# Patient Record
Sex: MALE | Race: WHITE | NOT HISPANIC OR LATINO
[De-identification: names, ages, dates, MRNs, and addresses within clinical notes are randomized per-mention and may not be internally consistent; named-entity substitution may affect disease eponyms.]

---

## 2019-04-03 ENCOUNTER — RECORD ABSTRACTING (OUTPATIENT)
Age: 74
End: 2019-04-03

## 2019-04-03 DIAGNOSIS — Z78.9 OTHER SPECIFIED HEALTH STATUS: ICD-10-CM

## 2019-04-03 PROBLEM — Z00.00 ENCOUNTER FOR PREVENTIVE HEALTH EXAMINATION: Status: ACTIVE | Noted: 2019-04-03

## 2019-04-03 RX ORDER — ELECTROLYTES/DEXTROSE
SOLUTION, ORAL ORAL
Refills: 0 | Status: ACTIVE | COMMUNITY

## 2019-04-03 RX ORDER — ASPIRIN ENTERIC COATED TABLETS 81 MG 81 MG/1
81 TABLET, DELAYED RELEASE ORAL DAILY
Refills: 0 | Status: ACTIVE | COMMUNITY

## 2019-04-03 RX ORDER — VIT A/VIT C/VIT E/ZINC/COPPER 4296-226
CAPSULE ORAL
Refills: 0 | Status: ACTIVE | COMMUNITY

## 2019-04-15 ENCOUNTER — RX RENEWAL (OUTPATIENT)
Age: 74
End: 2019-04-15

## 2019-10-25 DIAGNOSIS — I10 ESSENTIAL (PRIMARY) HYPERTENSION: ICD-10-CM

## 2019-10-25 DIAGNOSIS — R07.89 OTHER CHEST PAIN: ICD-10-CM

## 2019-10-25 DIAGNOSIS — Z87.891 PERSONAL HISTORY OF NICOTINE DEPENDENCE: ICD-10-CM

## 2019-10-28 ENCOUNTER — NON-APPOINTMENT (OUTPATIENT)
Age: 74
End: 2019-10-28

## 2019-10-28 ENCOUNTER — APPOINTMENT (OUTPATIENT)
Dept: CARDIOLOGY | Facility: CLINIC | Age: 74
End: 2019-10-28
Payer: MEDICARE

## 2019-10-28 PROCEDURE — 99214 OFFICE O/P EST MOD 30 MIN: CPT

## 2019-10-28 PROCEDURE — 93000 ELECTROCARDIOGRAM COMPLETE: CPT

## 2019-10-29 VITALS — DIASTOLIC BLOOD PRESSURE: 78 MMHG | HEART RATE: 84 BPM | SYSTOLIC BLOOD PRESSURE: 120 MMHG

## 2019-10-29 NOTE — PHYSICAL EXAM
[General Appearance - Well Developed] : well developed [Normal Appearance] : normal appearance [Well Groomed] : well groomed [General Appearance - Well Nourished] : well nourished [No Deformities] : no deformities [General Appearance - In No Acute Distress] : no acute distress [Normal Conjunctiva] : the conjunctiva exhibited no abnormalities [Eyelids - No Xanthelasma] : the eyelids demonstrated no xanthelasmas [Normal Oral Mucosa] : normal oral mucosa [No Oral Pallor] : no oral pallor [No Oral Cyanosis] : no oral cyanosis [Normal Jugular Venous A Waves Present] : normal jugular venous A waves present [Normal Jugular Venous V Waves Present] : normal jugular venous V waves present [No Jugular Venous Corrales A Waves] : no jugular venous corrales A waves [Respiration, Rhythm And Depth] : normal respiratory rhythm and effort [Exaggerated Use Of Accessory Muscles For Inspiration] : no accessory muscle use [Auscultation Breath Sounds / Voice Sounds] : lungs were clear to auscultation bilaterally [Heart Sounds] : normal S1 and S2 [Abdomen Soft] : soft [Abdomen Tenderness] : non-tender [Abdomen Mass (___ Cm)] : no abdominal mass palpated [Abnormal Walk] : normal gait [Gait - Sufficient For Exercise Testing] : the gait was sufficient for exercise testing [Nail Clubbing] : no clubbing of the fingernails [Cyanosis, Localized] : no localized cyanosis [Petechial Hemorrhages (___cm)] : no petechial hemorrhages [Skin Color & Pigmentation] : normal skin color and pigmentation [] : no rash [No Venous Stasis] : no venous stasis [Skin Lesions] : no skin lesions [No Skin Ulcers] : no skin ulcer [No Xanthoma] : no  xanthoma was observed [Oriented To Time, Place, And Person] : oriented to person, place, and time [Affect] : the affect was normal [Mood] : the mood was normal [No Anxiety] : not feeling anxious [FreeTextEntry1] : Faint 1/6 systolic murmur left sternal border

## 2019-10-29 NOTE — HISTORY OF PRESENT ILLNESS
[FreeTextEntry1] : This 73 year-old male patient presents for cardiovascular evaluation.\par \par His problem list is as noted above.\par \par Since his last examination 1 year ago he reports no major events or hospitalizations.  He is active but admits to being not as active as he should be.  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

## 2019-10-29 NOTE — DISCUSSION/SUMMARY
[FreeTextEntry1] : Brief recommendations and follow-up: (see above for details)\par \par Overall cardiovascular status stable.\par Continue current routine.\par I encouraged him regarding exercise and diet.\par Heart murmur noted on today's examination.  Previous 2018 echocardiogram satisfactory.  I will consider follow-up after next year.\par Next routine office visit 1 year

## 2019-10-29 NOTE — REASON FOR VISIT
[FreeTextEntry1] : Mr. YUE MANE has the following problem list:\par \par Hypertension\par Dyslipidemia\par Type 2 diabetes\par Previous evaluation for chest discomfort with normal stress testing.\par \par He has additional medical problems as noted\par \par His primary care physician is Dr. Tate.\par He is also followed by Dr. Balderas for  evaluation.

## 2020-07-05 ENCOUNTER — RX RENEWAL (OUTPATIENT)
Age: 75
End: 2020-07-05

## 2020-10-30 ENCOUNTER — NON-APPOINTMENT (OUTPATIENT)
Age: 75
End: 2020-10-30

## 2020-11-02 ENCOUNTER — NON-APPOINTMENT (OUTPATIENT)
Age: 75
End: 2020-11-02

## 2020-11-02 ENCOUNTER — APPOINTMENT (OUTPATIENT)
Dept: CARDIOLOGY | Facility: CLINIC | Age: 75
End: 2020-11-02
Payer: MEDICARE

## 2020-11-02 VITALS
BODY MASS INDEX: 25.77 KG/M2 | HEIGHT: 70 IN | HEART RATE: 76 BPM | DIASTOLIC BLOOD PRESSURE: 90 MMHG | WEIGHT: 180 LBS | SYSTOLIC BLOOD PRESSURE: 160 MMHG

## 2020-11-02 VITALS
WEIGHT: 180 LBS | DIASTOLIC BLOOD PRESSURE: 90 MMHG | SYSTOLIC BLOOD PRESSURE: 160 MMHG | HEIGHT: 70 IN | HEART RATE: 76 BPM | BODY MASS INDEX: 25.77 KG/M2

## 2020-11-02 PROCEDURE — 93000 ELECTROCARDIOGRAM COMPLETE: CPT

## 2020-11-02 PROCEDURE — 99214 OFFICE O/P EST MOD 30 MIN: CPT

## 2020-11-02 RX ORDER — MULTIVIT-MIN/FOLIC/VIT K/LYCOP 400-300MCG
1000 TABLET ORAL
Refills: 0 | Status: DISCONTINUED | COMMUNITY
End: 2020-11-02

## 2020-11-02 RX ORDER — INSULIN DETEMIR 100 [IU]/ML
100 INJECTION, SOLUTION SUBCUTANEOUS
Refills: 0 | Status: DISCONTINUED | COMMUNITY
End: 2020-11-02

## 2020-11-02 RX ORDER — METFORMIN HYDROCHLORIDE 500 MG/1
500 TABLET, COATED ORAL
Refills: 0 | Status: DISCONTINUED | COMMUNITY
End: 2020-11-02

## 2020-11-02 RX ORDER — PSYLLIUM HUSK 0.4 G
CAPSULE ORAL
Refills: 0 | Status: DISCONTINUED | COMMUNITY
End: 2020-11-02

## 2020-11-02 NOTE — HISTORY OF PRESENT ILLNESS
[FreeTextEntry1] : This 74 year-old male patient presents for cardiovascular evaluation.\par \par His problem list is as noted above.\par \par Since last examination he reports no major events or hospitalizations.  He admits that he has been more sedentary than he should be.  He does have a treadmill at home and I encouraged him to use it.  I do believe he is enthusiastic and will start doing this.\par \par There have been no acute symptoms of shortness of breath, chest discomfort, palpitation, lightheadedness, fainting.\par \par He continues to perform as an exterior .  I advised him regarding the importance of being physically fit.  He did have laboratories at the fire "[employer] and will provide them for my review.\par \par He has been taking his blood pressure at home and it has been satisfactory.\par \par He has had some medication adjustments.  His previous insulin has been changed to oral agents.\par

## 2020-11-02 NOTE — REASON FOR VISIT
[FreeTextEntry1] : Mr. YUE MANE has the following problem list:\par \par Hypertension\par Dyslipidemia\par Type 2 diabetes\par Previous evaluation for chest discomfort with normal stress testing.\par \par He has additional medical problems as noted\par \par His primary care physician is Dr. Bundy\par He is also followed by Dr. Balderas for  evaluation.

## 2020-11-02 NOTE — ASSESSMENT
[FreeTextEntry1] : EKG 11/2/2020.  Sinus rhythm, within normal limits.\par EKG 10/28/2019.  Sinus rhythm.  APC.  Otherwise within normal limits.

## 2021-11-02 ENCOUNTER — RESULT CHARGE (OUTPATIENT)
Age: 76
End: 2021-11-02

## 2021-11-02 ENCOUNTER — NON-APPOINTMENT (OUTPATIENT)
Age: 76
End: 2021-11-02

## 2021-11-03 ENCOUNTER — APPOINTMENT (OUTPATIENT)
Dept: CARDIOLOGY | Facility: CLINIC | Age: 76
End: 2021-11-03
Payer: MEDICARE

## 2021-11-03 ENCOUNTER — NON-APPOINTMENT (OUTPATIENT)
Age: 76
End: 2021-11-03

## 2021-11-03 VITALS
WEIGHT: 182 LBS | BODY MASS INDEX: 26.05 KG/M2 | TEMPERATURE: 98.7 F | HEART RATE: 66 BPM | HEIGHT: 70 IN | SYSTOLIC BLOOD PRESSURE: 130 MMHG | DIASTOLIC BLOOD PRESSURE: 80 MMHG

## 2021-11-03 VITALS
TEMPERATURE: 98.7 F | WEIGHT: 182 LBS | DIASTOLIC BLOOD PRESSURE: 80 MMHG | SYSTOLIC BLOOD PRESSURE: 130 MMHG | BODY MASS INDEX: 26.05 KG/M2 | HEART RATE: 66 BPM | HEIGHT: 70 IN

## 2021-11-03 PROCEDURE — 99214 OFFICE O/P EST MOD 30 MIN: CPT

## 2021-11-03 PROCEDURE — 93000 ELECTROCARDIOGRAM COMPLETE: CPT

## 2021-11-03 RX ORDER — DULAGLUTIDE 0.75 MG/.5ML
0.75 INJECTION, SOLUTION SUBCUTANEOUS
Refills: 0 | Status: ACTIVE | COMMUNITY

## 2021-11-03 RX ORDER — METFORMIN ER 500 MG 500 MG/1
500 TABLET ORAL DAILY
Refills: 0 | Status: ACTIVE | COMMUNITY

## 2021-11-03 NOTE — ASSESSMENT
[FreeTextEntry1] : EKG 11/3/2021.  Sinus rhythm.  Within normal limits.\par EKG 11/2/2020.  Sinus rhythm, within normal limits.\par EKG 10/28/2019.  Sinus rhythm.  APC.  Otherwise within normal limits.

## 2021-11-03 NOTE — REVIEW OF SYSTEMS
[FreeTextEntry3] : Status post bilateral cataract surgery. [FreeTextEntry4] : He has hearing loss and sometimes wears hearing aids. [FreeTextEntry5] : See HPI. [FreeTextEntry8] : Nocturia x1.  Mild ED. [FreeTextEntry9] : Mild upper extremity arthritis.

## 2021-11-03 NOTE — DISCUSSION/SUMMARY
[FreeTextEntry1] : Brief recommendations and follow-up: (see above for details)\par \par The patient's overall cardiovascular status is stable.\par I encouraged him regarding therapeutic lifestyle treatment, exercise and diet particularly important since he is a .\par It is my judgment that he should stay on his low-dose aspirin.  He is having no side effects.\par He has received the COVID-19 vaccine.  He is also gotten his flu shot.\par Next routine cardiology visit 1 year.

## 2021-11-03 NOTE — HISTORY OF PRESENT ILLNESS
[FreeTextEntry1] : This 75 year-old male patient presents for cardiovascular evaluation.\par \par His problem list is as noted above.\par \par Since his last examination 1 year ago he has had some medical events.  He did have bilateral cataract surgery.  Otherwise there have been no other major events hospitalizations or serious illnesses.  He is active and walking regularly.  He continues to serve as a .  I did advise him to maintain his fitness.\par \par He has seen his primary care physician and had laboratories.  We have called for the reports.  He also kindly provided.\par \par There have been no symptoms of shortness of breath, chest discomfort, palpitation, lightheadedness, fainting.

## 2021-11-03 NOTE — CARDIOLOGY SUMMARY
[de-identified] : Stress test 10/25/18. No ischemia. Nonspecific hypertensive type upsloping ST   depression, rapidly reversing. No symptoms. 7 minutes. Olman stage III. 8.1 METs.  Greater than 100%. Peak blood pressure 212/84. Double product 33,284.\par  [de-identified] : Echo 9/4/18. Normal LV function. EF 75%. Trivial TR. Mobile intra-atrial septum\par

## 2022-06-15 ENCOUNTER — RX RENEWAL (OUTPATIENT)
Age: 77
End: 2022-06-15

## 2022-11-07 ENCOUNTER — NON-APPOINTMENT (OUTPATIENT)
Age: 77
End: 2022-11-07

## 2022-11-08 ENCOUNTER — NON-APPOINTMENT (OUTPATIENT)
Age: 77
End: 2022-11-08

## 2022-11-08 ENCOUNTER — APPOINTMENT (OUTPATIENT)
Dept: CARDIOLOGY | Facility: CLINIC | Age: 77
End: 2022-11-08

## 2022-11-08 VITALS
WEIGHT: 188 LBS | BODY MASS INDEX: 26.92 KG/M2 | SYSTOLIC BLOOD PRESSURE: 140 MMHG | DIASTOLIC BLOOD PRESSURE: 86 MMHG | RESPIRATION RATE: 16 BRPM | TEMPERATURE: 98.7 F | HEIGHT: 70 IN | HEART RATE: 94 BPM

## 2022-11-08 VITALS — OXYGEN SATURATION: 97 %

## 2022-11-08 PROCEDURE — 99214 OFFICE O/P EST MOD 30 MIN: CPT

## 2022-11-08 PROCEDURE — 93000 ELECTROCARDIOGRAM COMPLETE: CPT

## 2022-11-08 RX ORDER — ICOSAPENT ETHYL 1000 MG/1
1 CAPSULE ORAL
Qty: 360 | Refills: 0 | Status: ACTIVE | COMMUNITY
Start: 2021-12-17

## 2022-11-08 RX ORDER — ATORVASTATIN CALCIUM 40 MG/1
40 TABLET, FILM COATED ORAL DAILY
Qty: 90 | Refills: 3 | Status: ACTIVE | COMMUNITY

## 2022-11-08 NOTE — DISCUSSION/SUMMARY
[FreeTextEntry1] : Brief recommendations and follow-up: (see above for details)\par \par Patient's overall cardiovascular status is well compensated and stable.\par I encouraged him regarding blood pressure, lipid, weight control and exercise.\par He continues to work as a .\par He will provide copies of his most recent laboratories.  Also was  physical blood work.\par Next routine cardiology visit 1 year.

## 2022-11-08 NOTE — CARDIOLOGY SUMMARY
[de-identified] : Stress test 10/25/18. No ischemia. Nonspecific hypertensive type upsloping ST   depression, rapidly reversing. No symptoms. 7 minutes. Olman stage III. 8.1 METs.  Greater than 100%. Peak blood pressure 212/84. Double product 33,284.\par  [de-identified] : Echo 9/4/18. Normal LV function. EF 75%. Trivial TR. Mobile intra-atrial septum\par

## 2022-11-08 NOTE — REASON FOR VISIT
[FreeTextEntry1] : Mr. YUE MANE has the following problem list:\par \par Hypertension\par Dyslipidemia\par Type 2 diabetes\par Previous evaluation for chest discomfort with normal stress testing.\par \par He has additional medical problems as noted\par \par His primary care physician is Dr. Bundy\par He is also followed by Dr. Dominguez for  evaluation.

## 2022-11-08 NOTE — ASSESSMENT
[FreeTextEntry1] : EKG 11/8/2022.  Sinus rhythm, within normal limits.\par EKG 11/3/2021.  Sinus rhythm.  Within normal limits.\par EKG 11/2/2020.  Sinus rhythm, within normal limits.\par EKG 10/28/2019.  Sinus rhythm.  APC.  Otherwise within normal limits.

## 2022-11-08 NOTE — HISTORY OF PRESENT ILLNESS
[FreeTextEntry1] : This 76 year-old male patient presents for cardiovascular evaluation.\par \par His problem list is as noted above.\par \par Notes his last examination 1 year ago he reports no major events or hospitalizations.\par \par He continues to work as a  and states that he does exercise 2-3 times a week.  I encouraged him regarding this.\par \par He has had laboratories with his primary care physician and as part of a  physical.  He will provide the data for me.  I recommended that he bring this with him at every visit.\par \par There have been no symptoms of shortness of breath, chest discomfort, palpitation, lightheadedness, fainting.\par

## 2022-11-08 NOTE — REVIEW OF SYSTEMS
[FreeTextEntry3] : Status post bilateral cataract surgery. [FreeTextEntry4] : He has hearing loss and sometimes wears hearing aids. [FreeTextEntry5] : See HPI. [FreeTextEntry8] : Nocturia x1.  Mild ED. [FreeTextEntry9] : No significant arthritic symptoms.

## 2022-11-08 NOTE — PHYSICAL EXAM
[de-identified] : Pain of 6 pounds since last exam. [de-identified] : 1/6 systolic murmur left sternal border. [de-identified] : Lesion on right cheek.  (He is seeing a dermatologist).

## 2023-05-26 ENCOUNTER — RX RENEWAL (OUTPATIENT)
Age: 78
End: 2023-05-26

## 2023-06-12 NOTE — ADDENDUM
[FreeTextEntry1] : This report was generated using voice recognition software. Please excuse obvious typographical errors and contact this office for any questions. Any preliminary copy of this note given to the patient at the time of this visit has not been proofread or edited. This note is part of a shared electronic record used by our providers and may contain information generated by others in addition to those entries made during today's visit.\par  No

## 2023-11-13 ENCOUNTER — NON-APPOINTMENT (OUTPATIENT)
Age: 78
End: 2023-11-13

## 2023-11-14 ENCOUNTER — APPOINTMENT (OUTPATIENT)
Dept: CARDIOLOGY | Facility: CLINIC | Age: 78
End: 2023-11-14
Payer: MEDICARE

## 2023-11-14 ENCOUNTER — NON-APPOINTMENT (OUTPATIENT)
Age: 78
End: 2023-11-14

## 2023-11-14 VITALS
DIASTOLIC BLOOD PRESSURE: 80 MMHG | BODY MASS INDEX: 27.06 KG/M2 | WEIGHT: 189 LBS | SYSTOLIC BLOOD PRESSURE: 138 MMHG | HEIGHT: 70 IN

## 2023-11-14 VITALS
HEIGHT: 70 IN | BODY MASS INDEX: 27.06 KG/M2 | DIASTOLIC BLOOD PRESSURE: 80 MMHG | SYSTOLIC BLOOD PRESSURE: 138 MMHG | HEART RATE: 90 BPM | WEIGHT: 189 LBS

## 2023-11-14 DIAGNOSIS — Z01.89 ENCOUNTER FOR OTHER SPECIFIED SPECIAL EXAMINATIONS: ICD-10-CM

## 2023-11-14 DIAGNOSIS — E78.5 HYPERLIPIDEMIA, UNSPECIFIED: ICD-10-CM

## 2023-11-14 DIAGNOSIS — I10 ESSENTIAL (PRIMARY) HYPERTENSION: ICD-10-CM

## 2023-11-14 DIAGNOSIS — E11.9 TYPE 2 DIABETES MELLITUS W/OUT COMPLICATIONS: ICD-10-CM

## 2023-11-14 PROCEDURE — 99214 OFFICE O/P EST MOD 30 MIN: CPT | Mod: 25

## 2023-11-14 PROCEDURE — 93000 ELECTROCARDIOGRAM COMPLETE: CPT

## 2023-12-27 ENCOUNTER — APPOINTMENT (OUTPATIENT)
Dept: CARDIOLOGY | Facility: CLINIC | Age: 78
End: 2023-12-27
Payer: MEDICARE

## 2023-12-27 DIAGNOSIS — Z92.89 PERSONAL HISTORY OF OTHER MEDICAL TREATMENT: ICD-10-CM

## 2023-12-27 DIAGNOSIS — I38 ENDOCARDITIS, VALVE UNSPECIFIED: ICD-10-CM

## 2023-12-27 PROCEDURE — 93306 TTE W/DOPPLER COMPLETE: CPT

## 2024-05-19 ENCOUNTER — RX RENEWAL (OUTPATIENT)
Age: 79
End: 2024-05-19

## 2024-05-19 RX ORDER — LOSARTAN POTASSIUM 50 MG/1
50 TABLET, FILM COATED ORAL DAILY
Qty: 90 | Refills: 3 | Status: ACTIVE | COMMUNITY
Start: 2020-07-05 | End: 1900-01-01

## 2025-01-01 ENCOUNTER — RESULT REVIEW (OUTPATIENT)
Age: 80
End: 2025-01-01

## 2025-01-01 ENCOUNTER — TRANSCRIPTION ENCOUNTER (OUTPATIENT)
Age: 80
End: 2025-01-01

## 2025-03-03 ENCOUNTER — RESULT REVIEW (OUTPATIENT)
Age: 80
End: 2025-03-03

## 2025-03-05 ENCOUNTER — TRANSCRIPTION ENCOUNTER (OUTPATIENT)
Age: 80
End: 2025-03-05

## 2025-03-13 ENCOUNTER — APPOINTMENT (OUTPATIENT)
Dept: CARDIOLOGY | Facility: CLINIC | Age: 80
End: 2025-03-13

## 2025-03-15 ENCOUNTER — TRANSCRIPTION ENCOUNTER (OUTPATIENT)
Age: 80
End: 2025-03-15

## 2025-03-26 ENCOUNTER — APPOINTMENT (OUTPATIENT)
Dept: SURGERY | Facility: CLINIC | Age: 80
End: 2025-03-26

## 2025-03-26 VITALS
WEIGHT: 162.2 LBS | SYSTOLIC BLOOD PRESSURE: 131 MMHG | DIASTOLIC BLOOD PRESSURE: 77 MMHG | BODY MASS INDEX: 23.27 KG/M2 | OXYGEN SATURATION: 96 % | HEART RATE: 109 BPM

## 2025-04-01 ENCOUNTER — APPOINTMENT (OUTPATIENT)
Dept: GASTROENTEROLOGY | Facility: CLINIC | Age: 80
End: 2025-04-01
Payer: MEDICARE

## 2025-04-01 VITALS
WEIGHT: 163 LBS | DIASTOLIC BLOOD PRESSURE: 70 MMHG | BODY MASS INDEX: 23.34 KG/M2 | HEIGHT: 70 IN | SYSTOLIC BLOOD PRESSURE: 120 MMHG

## 2025-04-01 DIAGNOSIS — D64.9 ANEMIA, UNSPECIFIED: ICD-10-CM

## 2025-04-01 PROCEDURE — 99203 OFFICE O/P NEW LOW 30 MIN: CPT

## 2025-04-01 PROCEDURE — 36415 COLL VENOUS BLD VENIPUNCTURE: CPT

## 2025-04-03 LAB
ALBUMIN SERPL ELPH-MCNC: 3.3 G/DL
ALP BLD-CCNC: 196 U/L
ALT SERPL-CCNC: 192 U/L
ANION GAP SERPL CALC-SCNC: 14 MMOL/L
AST SERPL-CCNC: 115 U/L
BILIRUB SERPL-MCNC: 0.3 MG/DL
BUN SERPL-MCNC: 17 MG/DL
CALCIUM SERPL-MCNC: 9.1 MG/DL
CHLORIDE SERPL-SCNC: 98 MMOL/L
CO2 SERPL-SCNC: 26 MMOL/L
CREAT SERPL-MCNC: 0.75 MG/DL
EGFRCR SERPLBLD CKD-EPI 2021: 92 ML/MIN/1.73M2
ENDOMYSIUM IGA SER QL: NEGATIVE
ENDOMYSIUM IGA TITR SER: NORMAL
FERRITIN SERPL-MCNC: 746 NG/ML
GLUCOSE SERPL-MCNC: 195 MG/DL
HCT VFR BLD CALC: 34.4 %
HGB BLD-MCNC: 11.1 G/DL
IRON SATN MFR SERPL: 16 %
IRON SERPL-MCNC: 37 UG/DL
MCHC RBC-ENTMCNC: 30.4 PG
MCHC RBC-ENTMCNC: 32.3 G/DL
MCV RBC AUTO: 94.2 FL
PLATELET # BLD AUTO: 351 K/UL
POTASSIUM SERPL-SCNC: 4.7 MMOL/L
PROT SERPL-MCNC: 6.9 G/DL
RBC # BLD: 3.65 M/UL
RBC # FLD: 15.1 %
SODIUM SERPL-SCNC: 138 MMOL/L
TIBC SERPL-MCNC: 228 UG/DL
UIBC SERPL-MCNC: 191 UG/DL
WBC # FLD AUTO: 5.79 K/UL

## 2025-04-04 DIAGNOSIS — K76.89 OTHER SPECIFIED DISEASES OF LIVER: ICD-10-CM

## 2025-04-29 ENCOUNTER — RESULT REVIEW (OUTPATIENT)
Age: 80
End: 2025-04-29

## 2025-05-08 ENCOUNTER — RESULT REVIEW (OUTPATIENT)
Age: 80
End: 2025-05-08

## 2025-05-09 ENCOUNTER — INPATIENT (INPATIENT)
Facility: HOSPITAL | Age: 80
LOS: 6 days | Discharge: HOME | End: 2025-05-16
Attending: THORACIC SURGERY (CARDIOTHORACIC VASCULAR SURGERY) | Admitting: THORACIC SURGERY (CARDIOTHORACIC VASCULAR SURGERY)
Payer: MEDICARE

## 2025-05-09 ENCOUNTER — RESULT REVIEW (OUTPATIENT)
Age: 80
End: 2025-05-09

## 2025-05-09 ENCOUNTER — TRANSCRIPTION ENCOUNTER (OUTPATIENT)
Age: 80
End: 2025-05-09

## 2025-05-09 VITALS
HEART RATE: 102 BPM | SYSTOLIC BLOOD PRESSURE: 158 MMHG | RESPIRATION RATE: 20 BRPM | OXYGEN SATURATION: 97 % | DIASTOLIC BLOOD PRESSURE: 51 MMHG

## 2025-05-09 DIAGNOSIS — Z90.49 ACQUIRED ABSENCE OF OTHER SPECIFIED PARTS OF DIGESTIVE TRACT: Chronic | ICD-10-CM

## 2025-05-09 DIAGNOSIS — Z98.890 OTHER SPECIFIED POSTPROCEDURAL STATES: Chronic | ICD-10-CM

## 2025-05-09 LAB
A1C WITH ESTIMATED AVERAGE GLUCOSE RESULT: 8.4 % — HIGH (ref 4–5.6)
ADD ON TEST-SPECIMEN IN LAB: SIGNIFICANT CHANGE UP
ALBUMIN SERPL ELPH-MCNC: 2.7 G/DL — LOW (ref 3.3–5)
ALP SERPL-CCNC: 118 U/L — SIGNIFICANT CHANGE UP (ref 40–120)
ALT FLD-CCNC: 172 U/L — HIGH (ref 10–45)
ANION GAP SERPL CALC-SCNC: 19 MMOL/L — HIGH (ref 5–17)
APPEARANCE UR: ABNORMAL
APTT BLD: 30.7 SEC — SIGNIFICANT CHANGE UP (ref 26.1–36.8)
AST SERPL-CCNC: 111 U/L — HIGH (ref 10–40)
BASOPHILS # BLD AUTO: 0 K/UL — SIGNIFICANT CHANGE UP (ref 0–0.2)
BASOPHILS NFR BLD AUTO: 0 % — SIGNIFICANT CHANGE UP (ref 0–2)
BILIRUB SERPL-MCNC: 0.6 MG/DL — SIGNIFICANT CHANGE UP (ref 0.2–1.2)
BILIRUB UR-MCNC: NEGATIVE — SIGNIFICANT CHANGE UP
BLD GP AB SCN SERPL QL: NEGATIVE — SIGNIFICANT CHANGE UP
BUN SERPL-MCNC: 66 MG/DL — HIGH (ref 7–23)
CALCIUM SERPL-MCNC: 8.9 MG/DL — SIGNIFICANT CHANGE UP (ref 8.4–10.5)
CHLORIDE SERPL-SCNC: 100 MMOL/L — SIGNIFICANT CHANGE UP (ref 96–108)
CHOLEST SERPL-MCNC: 72 MG/DL — SIGNIFICANT CHANGE UP
CK MB CFR SERPL CALC: 2.3 NG/ML — SIGNIFICANT CHANGE UP (ref 0–6.7)
CK SERPL-CCNC: 20 U/L — LOW (ref 30–200)
CO2 SERPL-SCNC: 15 MMOL/L — LOW (ref 22–31)
COLOR SPEC: SIGNIFICANT CHANGE UP
CREAT SERPL-MCNC: 1.92 MG/DL — HIGH (ref 0.5–1.3)
DIFF PNL FLD: NEGATIVE — SIGNIFICANT CHANGE UP
EGFR: 35 ML/MIN/1.73M2 — LOW
EGFR: 35 ML/MIN/1.73M2 — LOW
EOSINOPHIL # BLD AUTO: 0 K/UL — SIGNIFICANT CHANGE UP (ref 0–0.5)
EOSINOPHIL NFR BLD AUTO: 0 % — SIGNIFICANT CHANGE UP (ref 0–6)
ESTIMATED AVERAGE GLUCOSE: 194 MG/DL — HIGH (ref 68–114)
GAS PNL BLDA: SIGNIFICANT CHANGE UP
GLUCOSE SERPL-MCNC: 332 MG/DL — HIGH (ref 70–99)
GLUCOSE UR QL: NEGATIVE MG/DL — SIGNIFICANT CHANGE UP
HCT VFR BLD CALC: 33 % — LOW (ref 39–50)
HDLC SERPL-MCNC: 15 MG/DL — LOW
HGB BLD-MCNC: 10.9 G/DL — LOW (ref 13–17)
IMM GRANULOCYTES NFR BLD AUTO: 0.5 % — SIGNIFICANT CHANGE UP (ref 0–0.9)
INR BLD: 0.96 — SIGNIFICANT CHANGE UP (ref 0.85–1.16)
KETONES UR-MCNC: ABNORMAL MG/DL
LACTATE SERPL-SCNC: 3.3 MMOL/L — HIGH (ref 0.5–2)
LDLC SERPL-MCNC: 31 MG/DL — SIGNIFICANT CHANGE UP
LEUKOCYTE ESTERASE UR-ACNC: NEGATIVE — SIGNIFICANT CHANGE UP
LIPID PNL WITH DIRECT LDL SERPL: 31 MG/DL — SIGNIFICANT CHANGE UP
LYMPHOCYTES # BLD AUTO: 0.66 K/UL — LOW (ref 1–3.3)
LYMPHOCYTES # BLD AUTO: 7.9 % — LOW (ref 13–44)
MCHC RBC-ENTMCNC: 29 PG — SIGNIFICANT CHANGE UP (ref 27–34)
MCHC RBC-ENTMCNC: 33 G/DL — SIGNIFICANT CHANGE UP (ref 32–36)
MCV RBC AUTO: 87.8 FL — SIGNIFICANT CHANGE UP (ref 80–100)
MONOCYTES # BLD AUTO: 0.16 K/UL — SIGNIFICANT CHANGE UP (ref 0–0.9)
MONOCYTES NFR BLD AUTO: 1.9 % — LOW (ref 2–14)
NEUTROPHILS # BLD AUTO: 7.45 K/UL — HIGH (ref 1.8–7.4)
NEUTROPHILS NFR BLD AUTO: 89.7 % — HIGH (ref 43–77)
NITRITE UR-MCNC: NEGATIVE — SIGNIFICANT CHANGE UP
NONHDLC SERPL-MCNC: 57 MG/DL — SIGNIFICANT CHANGE UP
NRBC BLD AUTO-RTO: 0 /100 WBCS — SIGNIFICANT CHANGE UP (ref 0–0)
NT-PROBNP SERPL-SCNC: HIGH PG/ML (ref 0–300)
PH UR: 5 — SIGNIFICANT CHANGE UP (ref 5–8)
PLATELET # BLD AUTO: 233 K/UL — SIGNIFICANT CHANGE UP (ref 150–400)
POTASSIUM SERPL-MCNC: 4.9 MMOL/L — SIGNIFICANT CHANGE UP (ref 3.5–5.3)
POTASSIUM SERPL-SCNC: 4.9 MMOL/L — SIGNIFICANT CHANGE UP (ref 3.5–5.3)
PROCALCITONIN SERPL-MCNC: 0.43 NG/ML — HIGH (ref 0.02–0.1)
PROT SERPL-MCNC: 6.6 G/DL — SIGNIFICANT CHANGE UP (ref 6–8.3)
PROT UR-MCNC: 30 MG/DL
PROTHROM AB SERPL-ACNC: 11.1 SEC — SIGNIFICANT CHANGE UP (ref 9.9–13.4)
RBC # BLD: 3.76 M/UL — LOW (ref 4.2–5.8)
RBC # FLD: 18.2 % — HIGH (ref 10.3–14.5)
RH IG SCN BLD-IMP: POSITIVE — SIGNIFICANT CHANGE UP
SODIUM SERPL-SCNC: 134 MMOL/L — LOW (ref 135–145)
SP GR SPEC: 1.02 — SIGNIFICANT CHANGE UP (ref 1–1.03)
TRIGL SERPL-MCNC: 150 MG/DL — HIGH
TROPONIN T, HIGH SENSITIVITY RESULT: 159 NG/L — CRITICAL HIGH (ref 0–51)
TSH SERPL-MCNC: 0.94 UIU/ML — SIGNIFICANT CHANGE UP (ref 0.27–4.2)
UROBILINOGEN FLD QL: 1 MG/DL — SIGNIFICANT CHANGE UP (ref 0.2–1)
WBC # BLD: 8.31 K/UL — SIGNIFICANT CHANGE UP (ref 3.8–10.5)
WBC # FLD AUTO: 8.31 K/UL — SIGNIFICANT CHANGE UP (ref 3.8–10.5)

## 2025-05-09 PROCEDURE — 71045 X-RAY EXAM CHEST 1 VIEW: CPT | Mod: 26

## 2025-05-09 PROCEDURE — 99292 CRITICAL CARE ADDL 30 MIN: CPT | Mod: 25

## 2025-05-09 PROCEDURE — 31500 INSERT EMERGENCY AIRWAY: CPT

## 2025-05-09 PROCEDURE — 99292 CRITICAL CARE ADDL 30 MIN: CPT

## 2025-05-09 PROCEDURE — 99291 CRITICAL CARE FIRST HOUR: CPT | Mod: 25

## 2025-05-09 PROCEDURE — ZZZZZ: CPT

## 2025-05-09 RX ORDER — FUROSEMIDE 10 MG/ML
60 INJECTION INTRAMUSCULAR; INTRAVENOUS ONCE
Refills: 0 | Status: COMPLETED | OUTPATIENT
Start: 2025-05-09 | End: 2025-05-09

## 2025-05-09 RX ORDER — POLYETHYLENE GLYCOL 3350 17 G/17G
17 POWDER, FOR SOLUTION ORAL EVERY 24 HOURS
Refills: 0 | Status: DISCONTINUED | OUTPATIENT
Start: 2025-05-09 | End: 2025-05-16

## 2025-05-09 RX ORDER — CISATRACURIUM BESYLATE 10 MG/5ML
10 INJECTION, SOLUTION INTRAVENOUS ONCE
Refills: 0 | Status: COMPLETED | OUTPATIENT
Start: 2025-05-09 | End: 2025-05-09

## 2025-05-09 RX ORDER — FENTANYL CITRATE-0.9 % NACL/PF 100MCG/2ML
50 SYRINGE (ML) INTRAVENOUS ONCE
Refills: 0 | Status: DISCONTINUED | OUTPATIENT
Start: 2025-05-09 | End: 2025-05-10

## 2025-05-09 RX ORDER — PIPERACILLIN-TAZO-DEXTROSE,ISO 2.25G/50ML
3.38 IV SOLUTION, PIGGYBACK PREMIX FROZEN(ML) INTRAVENOUS EVERY 8 HOURS
Refills: 0 | Status: DISCONTINUED | OUTPATIENT
Start: 2025-05-10 | End: 2025-05-10

## 2025-05-09 RX ORDER — PROPOFOL 10 MG/ML
30 INJECTION, EMULSION INTRAVENOUS
Qty: 1000 | Refills: 0 | Status: DISCONTINUED | OUTPATIENT
Start: 2025-05-09 | End: 2025-05-13

## 2025-05-09 RX ORDER — FUROSEMIDE 10 MG/ML
20 INJECTION INTRAMUSCULAR; INTRAVENOUS ONCE
Refills: 0 | Status: COMPLETED | OUTPATIENT
Start: 2025-05-09 | End: 2025-05-09

## 2025-05-09 RX ORDER — DEXTROSE 50 % IN WATER 50 %
50 SYRINGE (ML) INTRAVENOUS
Refills: 0 | Status: DISCONTINUED | OUTPATIENT
Start: 2025-05-09 | End: 2025-05-16

## 2025-05-09 RX ORDER — VANCOMYCIN HCL IN 5 % DEXTROSE 1.5G/250ML
1500 PLASTIC BAG, INJECTION (ML) INTRAVENOUS ONCE
Refills: 0 | Status: COMPLETED | OUTPATIENT
Start: 2025-05-09 | End: 2025-05-09

## 2025-05-09 RX ORDER — SENNA 187 MG
2 TABLET ORAL AT BEDTIME
Refills: 0 | Status: DISCONTINUED | OUTPATIENT
Start: 2025-05-09 | End: 2025-05-16

## 2025-05-09 RX ORDER — ASPIRIN 325 MG
81 TABLET ORAL DAILY
Refills: 0 | Status: DISCONTINUED | OUTPATIENT
Start: 2025-05-09 | End: 2025-05-16

## 2025-05-09 RX ORDER — AZITHROMYCIN 250 MG
500 CAPSULE ORAL ONCE
Refills: 0 | Status: COMPLETED | OUTPATIENT
Start: 2025-05-09 | End: 2025-05-09

## 2025-05-09 RX ORDER — VASOPRESSIN 20 [USP'U]/ML
0.08 INJECTION INTRAVENOUS
Qty: 40 | Refills: 0 | Status: DISCONTINUED | OUTPATIENT
Start: 2025-05-09 | End: 2025-05-13

## 2025-05-09 RX ORDER — ASPIRIN 325 MG
0 TABLET ORAL
Refills: 0 | DISCHARGE

## 2025-05-09 RX ORDER — AZITHROMYCIN 250 MG
500 CAPSULE ORAL EVERY 24 HOURS
Refills: 0 | Status: DISCONTINUED | OUTPATIENT
Start: 2025-05-10 | End: 2025-05-10

## 2025-05-09 RX ORDER — PHENYLEPHRINE HCL IN 0.9% NACL 0.5 MG/5ML
0.5 SYRINGE (ML) INTRAVENOUS
Qty: 40 | Refills: 0 | Status: DISCONTINUED | OUTPATIENT
Start: 2025-05-09 | End: 2025-05-10

## 2025-05-09 RX ORDER — DOBUTAMINE 250 MG/20ML
1 INJECTION INTRAVENOUS
Qty: 500 | Refills: 0 | Status: DISCONTINUED | OUTPATIENT
Start: 2025-05-09 | End: 2025-05-15

## 2025-05-09 RX ORDER — PIPERACILLIN-TAZO-DEXTROSE,ISO 2.25G/50ML
3.38 IV SOLUTION, PIGGYBACK PREMIX FROZEN(ML) INTRAVENOUS ONCE
Refills: 0 | Status: COMPLETED | OUTPATIENT
Start: 2025-05-09 | End: 2025-05-09

## 2025-05-09 RX ORDER — AZITHROMYCIN 250 MG
CAPSULE ORAL
Refills: 0 | Status: DISCONTINUED | OUTPATIENT
Start: 2025-05-09 | End: 2025-05-10

## 2025-05-09 RX ORDER — HEPARIN SODIUM 1000 [USP'U]/ML
5000 INJECTION INTRAVENOUS; SUBCUTANEOUS EVERY 8 HOURS
Refills: 0 | Status: DISCONTINUED | OUTPATIENT
Start: 2025-05-09 | End: 2025-05-16

## 2025-05-09 RX ORDER — NOREPINEPHRINE BITARTRATE 8 MG
0.05 SOLUTION INTRAVENOUS
Qty: 8 | Refills: 0 | Status: DISCONTINUED | OUTPATIENT
Start: 2025-05-09 | End: 2025-05-10

## 2025-05-09 RX ORDER — FUROSEMIDE 10 MG/ML
5 INJECTION INTRAMUSCULAR; INTRAVENOUS
Qty: 500 | Refills: 0 | Status: DISCONTINUED | OUTPATIENT
Start: 2025-05-09 | End: 2025-05-12

## 2025-05-09 RX ADMIN — Medication 200 GRAM(S): at 18:59

## 2025-05-09 RX ADMIN — FUROSEMIDE 20 MILLIGRAM(S): 10 INJECTION INTRAMUSCULAR; INTRAVENOUS at 21:56

## 2025-05-09 RX ADMIN — FUROSEMIDE 60 MILLIGRAM(S): 10 INJECTION INTRAMUSCULAR; INTRAVENOUS at 23:14

## 2025-05-09 RX ADMIN — Medication 3 MILLILITER(S): at 21:54

## 2025-05-09 RX ADMIN — DOBUTAMINE 6.72 MICROGRAM(S)/KG/MIN: 250 INJECTION INTRAVENOUS at 21:46

## 2025-05-09 RX ADMIN — Medication 300 MILLIGRAM(S): at 21:46

## 2025-05-09 RX ADMIN — VASOPRESSIN 12 UNIT(S)/MIN: 20 INJECTION INTRAVENOUS at 18:57

## 2025-05-09 RX ADMIN — PROPOFOL 13.4 MICROGRAM(S)/KG/MIN: 10 INJECTION, EMULSION INTRAVENOUS at 18:57

## 2025-05-09 RX ADMIN — HEPARIN SODIUM 5000 UNIT(S): 1000 INJECTION INTRAVENOUS; SUBCUTANEOUS at 21:47

## 2025-05-09 RX ADMIN — CISATRACURIUM BESYLATE 10 MILLIGRAM(S): 10 INJECTION, SOLUTION INTRAVENOUS at 19:45

## 2025-05-09 RX ADMIN — Medication 14 MICROGRAM(S)/KG/MIN: at 20:22

## 2025-05-09 RX ADMIN — Medication 255 MILLIGRAM(S): at 20:46

## 2025-05-09 RX ADMIN — Medication 25 GRAM(S): at 23:15

## 2025-05-09 RX ADMIN — NOREPINEPHRINE BITARTRATE 7 MICROGRAM(S)/KG/MIN: 8 SOLUTION at 18:57

## 2025-05-09 NOTE — H&P ADULT - NSHPPHYSICALEXAM_GEN_ALL_CORE
GEN: NAD, looks comfortable  Neuro: A&Ox3.  No focal deficits.  Moving all extremities.   CV: S1S2, regular, no murmurs appreciated.  No carotid bruits.  No JVD  Lungs: Clear B/L.  No wheezing, rales or rhonchi  ABD: Soft, non-tender, non-distended.  +Bowel sounds  EXT: Warm and well perfused.  No peripheral edema noted. All Distal pulses palpable bilaterally.   Musculoskeletal: Moving all extremities with normal ROM, no joint swelling

## 2025-05-09 NOTE — PROGRESS NOTE ADULT - SUBJECTIVE AND OBJECTIVE BOX
CTICU  CRITICAL  CARE  attending     Hand off received 					   Pertinent clinical, laboratory, radiographic, hemodynamic, echocardiographic, respiratory data, microbiologic data and chart were reviewed and analyzed frequently throughout the course of the day  Patient seen and examined with CTS/ SH attending at bedside  Pt is a 79yr old male with PMH HTN, HLD, DM (A1C 8.4), presented to [facility] with sob and weakness x 2 weeks. Workup revealed multifocal PNA, acute respiratory failure requiring BIPAP and ICU admission, bacteremia with gram positive cocci and TTE with concern for vegetation on aortic valve leaflets prompting tx to [facility] 5/9 for surgical evaluation. Pt arrived on BIPAP and levo 0.1. Intubated, bronched, arterial and TLC line placed.             FAMILY HISTORY:  PAST MEDICAL & SURGICAL HISTORY:  HTN (hypertension)  HLD (hyperlipidemia)  Pneumonia  Aortic valve endocarditis  H/O appendicostomy  History of cholecystectomy        Patient is a 79y old  Male who presents with a chief complaint of     14 system review was unremarkable    Vital signs, hemodynamic and respiratory parameters were reviewed from the bedside nursing flowsheet.  ICU Vital Signs Last 24 Hrs  T(C): 36.3 (09 May 2025 17:25), Max: 36.3 (09 May 2025 17:25)  T(F): 97.4 (09 May 2025 17:25), Max: 97.4 (09 May 2025 17:25)  HR: 71 (09 May 2025 20:00) (71 - 102)  BP: 158/51 (09 May 2025 17:17) (158/51 - 158/51)  BP(mean): 81 (09 May 2025 17:17) (81 - 81)  ABP: 107/40 (09 May 2025 20:00) (107/40 - 143/51)  ABP(mean): 60 (09 May 2025 20:00) (60 - 81)  RR: 12 (09 May 2025 20:00) (12 - 20)  SpO2: 100% (09 May 2025 20:00) (93% - 100%)    O2 Parameters below as of 09 May 2025 20:00  Patient On (Oxygen Delivery Method): ventilator    O2 Concentration (%): 100      Adult Advanced Hemodynamics Last 24 Hrs  CVP(mm Hg): --  CVP(cm H2O): --  CO: --  CI: --  PA: --  PA(mean): --  PCWP: --  SVR: --  SVRI: --  PVR: --  PVRI: --, ABG - ( 09 May 2025 18:02 )  pH, Arterial: 7.36  pH, Blood: x     /  pCO2: 32    /  pO2: 97    / HCO3: 18    / Base Excess: -6.3  /  SaO2: 97.9              Mode: AC/ CMV (Assist Control/ Continuous Mandatory Ventilation)  RR (machine): 12  TV (machine): 550  FiO2: 100  PEEP: 6  ITime: 1  MAP: 10  PIP: 30    Intake and output was reviewed and the fluid balance was calculated  Daily     Daily   I&O's Summary    09 May 2025 07:01  -  09 May 2025 20:08  --------------------------------------------------------  IN: 134.2 mL / OUT: 100 mL / NET: 34.2 mL        All lines and drain sites were assessed  [facility]          Neuro:  HEENT:  Heart:  Lungs:  Abdomen:  Extremities:    Lines:  RIJ TLC 5/9  R radial arterial line 5/9      labs  CBC Full  -  ( 09 May 2025 17:21 )  WBC Count : 8.31 K/uL  RBC Count : 3.76 M/uL  Hemoglobin : 10.9 g/dL  Hematocrit : 33.0 %  Platelet Count - Automated : 233 K/uL  Mean Cell Volume : 87.8 fl  Mean Cell Hemoglobin : 29.0 pg  Mean Cell Hemoglobin Concentration : 33.0 g/dL  Auto Neutrophil # : x  Auto Lymphocyte # : x  Auto Monocyte # : x  Auto Eosinophil # : x  Auto Basophil # : x  Auto Neutrophil % : x  Auto Lymphocyte % : x  Auto Monocyte % : x  Auto Eosinophil % : x  Auto Basophil % : x    05-09    134[L]  |  100  |  66[H]  ----------------------------<  332[H]  4.9   |  15[L]  |  1.92[H]    Ca    8.9      09 May 2025 17:21    TPro  6.6  /  Alb  2.7[L]  /  TBili  0.6  /  DBili  x   /  AST  111[H]  /  ALT  172[H]  /  AlkPhos  118  05-09    PT/INR - ( 09 May 2025 17:21 )   PT: 11.1 sec;   INR: 0.96          PTT - ( 09 May 2025 17:21 )  PTT:30.7 sec  The current medications were reviewed   MEDICATIONS  (STANDING):  aspirin  chewable 81 milliGRAM(s) Oral daily  azithromycin  IVPB 500 milliGRAM(s) IV Intermittent once  azithromycin  IVPB      dextrose 50% Injectable 50 milliLiter(s) IV Push every 15 minutes  fentaNYL    Injectable 50 MICROGram(s) IV Push once  heparin   Injectable 5000 Unit(s) SubCutaneous every 8 hours  insulin regular Infusion 2 Unit(s)/Hr (2 mL/Hr) IV Continuous <Continuous>  norepinephrine Infusion 0.05 MICROgram(s)/kG/Min (7 mL/Hr) IV Continuous <Continuous>  phenylephrine    Infusion 0.5 MICROgram(s)/kG/Min (14 mL/Hr) IV Continuous <Continuous>  piperacillin/tazobactam IVPB.- 3.375 Gram(s) IV Intermittent once  polyethylene glycol 3350 17 Gram(s) Oral every 24 hours  propofol Infusion 30 MICROgram(s)/kG/Min (13.4 mL/Hr) IV Continuous <Continuous>  senna 2 Tablet(s) Oral at bedtime  sodium chloride 0.9% lock flush 3 milliLiter(s) IV Push every 8 hours  vancomycin  IVPB 1500 milliGRAM(s) IV Intermittent once  vasopressin Infusion 0.08 Unit(s)/Min (12 mL/Hr) IV Continuous <Continuous>    MEDICATIONS  (PRN):      Assessment/Plan:  79M with PMHx of HTN, HLD, T2DM, acute cholecystectomy c/b pleural effusions requiring drainage who presented to [facility] with sob and weakness x 2 weeks. In ED, he was found to have elevated troponins, Cr 1.9 (baseline 0.75) [place] 122[address] and transaminitisHe was hypoxic in ED with CT chest showing multifocal PNA and acute respiratory failure. He was placed on HFNC and transferred to ICU. Blood cultures were + for gram positive cocci. TTE showed reduce LVEF with thickening of AV leaflets concerning for vegetation. Steroids were started.  He was placed on Vanc/Zosyn/Azithromycin and is now transfererd to [facility] for mgmt.       AV Endocarditis  Gram positive cocci  Follow blood cultures  Continue broad spectrum abx  Acute respiratory failure requiring mechanical ventilation-keep intubated  V       s/p cardiac surgery    Acute systolic and diastolic heart failure evidenced by SOB and parenchymal infiltrates; will treat with diuresis    Cardiogenic shock on ionotropy    Vasogenic shock due to hypotension in the cticu , will keep on pressors    Hypovolemic shock - > 20% intravascular depletion will replete volume    Acute blood loss anemia with relative hypotension treated with > 1 unit PC    Acute respiratory failure ruled in due to hypoxemia, O2 sats < 91% on RA treated with HFNC    Acute respiratory failure ruled in due to hypercapnea on abg will treat with mechanical ventilation    Acute respiratory failure ruled in due to prolonged mechanical ventilation > 24 hrs on the vent due to failure to wean due to weak respiratory mechanics    Toxic metabolic encephalopathy ; sundowning due to anesthesia pain medications    Acidosis evidenced by anion gap and negative base excess    Acute kidney injury - creatinine > 0.3 due to combined prerenal and intrarenal factors can presume ATN    ESRD    Acute lesvia-operative ischemic stroke    Moderate protein calorie malutrition    Diet as tolerated  Replete lytes prn  Monitor CT output  GI/DVT PPX  Bowel Regimen  Pain control  OOB with PT    Titrate pressor support to maintain MAP >70  Titrate inotrope support to maintain CI >2.2/MVO2 >60  Close hemodynamic, ventilatory and drain monitoring and management per post op routine  Monitor for arrhythmias and monitor parameters for organ perfusion  Beta blockade not administered due to hemodynamic instability and bradycardia  Monitor neurologic status  Head of the bed should remain elevated to 45 deg   Chest PT and IS will be encouraged  Monitor adequacy of oxygenation and ventilation and attempt to wean oxygen  Antibiotic regimen will be tailored to the clinical, laboratory and microbiologic data  Nutritional goals will be met using po eventually, ensure adequate caloric intake and monitor the same  Stress ulcer and VTE prophylaxis will be achieved    Glycemic control is satisfactory  Electrolytes have been repleted as necessary and wound care has been carried out   Pain control has been achieved.   Aggressive physical therapy and early mobility and ambulation goals will be met   The family was updated about the course and plan.    CRITICAL CARE TIME personally provided by me  in evaluation and management, reassessments, review and interpretation of labs and x-rays, ventilator and hemodynamic management, formulating a plan and coordinating care: ___60___ MIN.  Time does not include procedural time.       CTICU ATTENDING     					  Jennifer Persaud MD CTICU  CRITICAL  CARE  attending     Hand off received 					   Pertinent clinical, laboratory, radiographic, hemodynamic, echocardiographic, respiratory data, microbiologic data and chart were reviewed and analyzed frequently throughout the course of the day  Patient seen and examined with CTS/ SH attending at bedside  Pt is a 79yr old male with PMH HTN, HLD, DM (A1C 8.4), presented to [facility] with sob and weakness x 2 weeks. Workup revealed multifocal PNA, acute respiratory failure requiring BIPAP and ICU admission, bacteremia with gram positive cocci and TTE with concern for vegetation on aortic valve leaflets prompting tx to [facility] 5/9 for surgical evaluation. Pt arrived on BIPAP and levo 0.1. Intubated, bronched, arterial and TLC line placed.       FAMILY HISTORY:  PAST MEDICAL & SURGICAL HISTORY:  HTN (hypertension)  HLD (hyperlipidemia)  Pneumonia  Aortic valve endocarditis  H/O appendicostomy  History of cholecystectomy        Patient is a 79y old  Male who presents with a chief complaint of     14 system review was unremarkable    Vital signs, hemodynamic and respiratory parameters were reviewed from the bedside nursing flowsheet.  ICU Vital Signs Last 24 Hrs  T(C): 36.3 (09 May 2025 17:25), Max: 36.3 (09 May 2025 17:25)  T(F): 97.4 (09 May 2025 17:25), Max: 97.4 (09 May 2025 17:25)  HR: 71 (09 May 2025 20:00) (71 - 102)  BP: 158/51 (09 May 2025 17:17) (158/51 - 158/51)  BP(mean): 81 (09 May 2025 17:17) (81 - 81)  ABP: 107/40 (09 May 2025 20:00) (107/40 - 143/51)  ABP(mean): 60 (09 May 2025 20:00) (60 - 81)  RR: 12 (09 May 2025 20:00) (12 - 20)  SpO2: 100% (09 May 2025 20:00) (93% - 100%)    O2 Parameters below as of 09 May 2025 20:00  Patient On (Oxygen Delivery Method): ventilator    O2 Concentration (%): 100      Adult Advanced Hemodynamics Last 24 Hrs  CVP(mm Hg): --  CVP(cm H2O): --  CO: --  CI: --  PA: --  PA(mean): --  PCWP: --  SVR: --  SVRI: --  PVR: --  PVRI: --, ABG - ( 09 May 2025 18:02 )  pH, Arterial: 7.36  pH, Blood: x     /  pCO2: 32    /  pO2: 97    / HCO3: 18    / Base Excess: -6.3  /  SaO2: 97.9              Mode: AC/ CMV (Assist Control/ Continuous Mandatory Ventilation)  RR (machine): 12  TV (machine): 550  FiO2: 100  PEEP: 6  ITime: 1  MAP: 10  PIP: 30    Intake and output was reviewed and the fluid balance was calculated  Daily     Daily   I&O's Summary    09 May 2025 07:01  -  09 May 2025 20:08  --------------------------------------------------------  IN: 134.2 mL / OUT: 100 mL / NET: 34.2 mL        All lines and drain sites were assessed  Glycemic trend was reviewedCAPILLARY BLOOD GLUCOSE      Neuro: sedated, pale elderly male  HEENT: ett  Heart: s1 s2  Lungs: decreased bl  Abdomen: soft nt nd  Extremities: cool extremities    Lines:  RIJ TLC 5/9  R radial arterial line 5/9      labs  CBC Full  -  ( 09 May 2025 17:21 )  WBC Count : 8.31 K/uL  RBC Count : 3.76 M/uL  Hemoglobin : 10.9 g/dL  Hematocrit : 33.0 %  Platelet Count - Automated : 233 K/uL  Mean Cell Volume : 87.8 fl  Mean Cell Hemoglobin : 29.0 pg  Mean Cell Hemoglobin Concentration : 33.0 g/dL  Auto Neutrophil # : x  Auto Lymphocyte # : x  Auto Monocyte # : x  Auto Eosinophil # : x  Auto Basophil # : x  Auto Neutrophil % : x  Auto Lymphocyte % : x  Auto Monocyte % : x  Auto Eosinophil % : x  Auto Basophil % : x    05-09    134[L]  |  100  |  66[H]  ----------------------------<  332[H]  4.9   |  15[L]  |  1.92[H]    Ca    8.9      09 May 2025 17:21    TPro  6.6  /  Alb  2.7[L]  /  TBili  0.6  /  DBili  x   /  AST  111[H]  /  ALT  172[H]  /  AlkPhos  118  05-09    PT/INR - ( 09 May 2025 17:21 )   PT: 11.1 sec;   INR: 0.96          PTT - ( 09 May 2025 17:21 )  PTT:30.7 sec  The current medications were reviewed   MEDICATIONS  (STANDING):  aspirin  chewable 81 milliGRAM(s) Oral daily  azithromycin  IVPB 500 milliGRAM(s) IV Intermittent once  azithromycin  IVPB      dextrose 50% Injectable 50 milliLiter(s) IV Push every 15 minutes  fentaNYL    Injectable 50 MICROGram(s) IV Push once  heparin   Injectable 5000 Unit(s) SubCutaneous every 8 hours  insulin regular Infusion 2 Unit(s)/Hr (2 mL/Hr) IV Continuous <Continuous>  norepinephrine Infusion 0.05 MICROgram(s)/kG/Min (7 mL/Hr) IV Continuous <Continuous>  phenylephrine    Infusion 0.5 MICROgram(s)/kG/Min (14 mL/Hr) IV Continuous <Continuous>  piperacillin/tazobactam IVPB.- 3.375 Gram(s) IV Intermittent once  polyethylene glycol 3350 17 Gram(s) Oral every 24 hours  propofol Infusion 30 MICROgram(s)/kG/Min (13.4 mL/Hr) IV Continuous <Continuous>  senna 2 Tablet(s) Oral at bedtime  sodium chloride 0.9% lock flush 3 milliLiter(s) IV Push every 8 hours  vancomycin  IVPB 1500 milliGRAM(s) IV Intermittent once  vasopressin Infusion 0.08 Unit(s)/Min (12 mL/Hr) IV Continuous <Continuous>    MEDICATIONS  (PRN):      Assessment/Plan:  79M with PMHx of HTN, HLD, T2DM, acute cholecystectomy c/b pleural effusions requiring drainage who presented to [facility] with sob and weakness x 2 weeks. In ED, he was found to have elevated troponins, Cr 1.9 (baseline 0.75) [place] [address] and transaminitisHe was hypoxic in ED with CT chest showing multifocal PNA and acute respiratory failure. He was placed on HFNC and transferred to ICU. Blood cultures were + for gram positive cocci. TTE showed reduce LVEF with thickening of AV leaflets concerning for vegetation. Steroids were started.  He was placed on Vanc/Zosyn/Azithromycin and is now transferred to [facility] for mgmt.       AV Endocarditis  Gram positive cocci  Follow blood cultures  Continue broad spectrum abx  Acute respiratory failure requiring mechanical ventilation-keep intubated  Vasogenic shock on levo, vaso-MAP goal >65  ID consult  Needs SHELLY  Preop workup  LANETTE  Transaminitis  Consider starting dobutamine for inotropic support  Renal consult  Elevated BNP  Diuresis  DM  Insulin per protocol  Tube feeds  Replete lytes prn  GI/DVT PPX  Bowel Regimen  Pain control  Close hemodynamic, ventilatory and drain monitoring and management per post op routine  Monitor for arrhythmias and monitor parameters for organ perfusion  Beta blockade not administered due to hemodynamic instability and bradycardia  Monitor neurologic status  Head of the bed should remain elevated to 45 deg   Chest PT and IS will be encouraged  Monitor adequacy of oxygenation and ventilation and attempt to wean oxygen  Antibiotic regimen will be tailored to the clinical, laboratory and microbiologic data  Nutritional goals will be met using po eventually, ensure adequate caloric intake and monitor the same  Stress ulcer and VTE prophylaxis will be achieved    Glycemic control is satisfactory  Electrolytes have been repleted as necessary and wound care has been carried out   Pain control has been achieved.   Aggressive physical therapy and early mobility and ambulation goals will be met   The family was updated about the course and plan.    CRITICAL CARE TIME personally provided by me  in evaluation and management, reassessments, review and interpretation of labs and x-rays, ventilator and hemodynamic management, formulating a plan and coordinating care: ___60___ MIN.  Time does not include procedural time.       CTICU ATTENDING     					  Jennifer Persaud MD

## 2025-05-09 NOTE — H&P ADULT - NSICDXPASTMEDICALHX_GEN_ALL_CORE_FT
PAST MEDICAL HISTORY:  Aortic valve endocarditis     HLD (hyperlipidemia)     HTN (hypertension)     Pneumonia

## 2025-05-09 NOTE — PROGRESS NOTE ADULT - SUBJECTIVE AND OBJECTIVE BOX
CTICU  CRITICAL  CARE  attending     Hand off received 					   Pertinent clinical, laboratory, radiographic, hemodynamic, echocardiographic, respiratory data, microbiologic data and chart were reviewed and analyzed frequently throughout the course of the day and night  Patient seen and examined with CTS/ SH attending at bedside    Pt is a 79y , Male, admitted from OSH with a diagnosis of bacteremia/acute IE with aortic valve vegetations    BIBEMS on Pressor support; with Levophed @ 0.1mcg; Bipap dependant    Intubated for Resp failure; bronchoscopy showing london colored fluid in the RML/LL c/w aspiration  New Central line /arterial line     79M with PMHx of HTN, HLD, T2DM, acute cholecystectomy c/b pleural effusions requiring drainage who presented to [facility] with sob and weakness x 2 weeks. In ED, he was found to have elevated troponins, Cr 1.9 (baseline 0.75) with B[place] of [address] and transaminitisHe was hypoxic in ED with CT chest showing multifocal PNA and acute respiratory failure. He was placed on HFNC and transferred to ICU. Blood cultures were + for gram positive cocci. TTE showed reduce LVEF with thickening of AV leaflets concerning for vegetation. Steroids were started.  He was placed on Vanc/Zosyn/Azithromycin and is now transfererd to [facility] for mgmt.       , FAMILY HISTORY:  PAST MEDICAL & SURGICAL HISTORY:  HTN (hypertension)      HLD (hyperlipidemia)      Pneumonia      Aortic valve endocarditis      H/O appendicostomy      History of cholecystectomy        Patient is a 79y old  Male who presents with a chief complaint of bacteremia/IE    14 system review unable to assess  acute changes include acute respiratory failure  Vital signs, hemodynamic and respiratory parameters were reviewed from the bedside nursing flowsheet.  ICU Vital Signs Last 24 Hrs  T(C): 36.3 (09 May 2025 17:25), Max: 36.3 (09 May 2025 17:25)  T(F): 97.4 (09 May 2025 17:25), Max: 97.4 (09 May 2025 17:25)  HR: 86 (09 May 2025 18:01) (86 - 102)  BP: 158/51 (09 May 2025 17:17) (158/51 - 158/51)  BP(mean): 81 (09 May 2025 17:17) (81 - 81)  ABP: 112/40 (09 May 2025 18:01) (112/40 - 143/51)  ABP(mean): 62 (09 May 2025 18:01) (62 - 81)  RR: 18 (09 May 2025 18:01) (18 - 20)  SpO2: 97% (09 May 2025 18:01) (93% - 99%)    O2 Parameters below as of 09 May 2025 18:01  Patient On (Oxygen Delivery Method): ventilator    O2 Concentration (%): 100      Adult Advanced Hemodynamics Last 24 Hrs  CVP(mm Hg): --  CVP(cm H2O): --  CO: --  CI: --  PA: --  PA(mean): --  PCWP: --  SVR: --  SVRI: --  PVR: --  PVRI: --, ABG - ( 09 May 2025 18:02 )  pH, Arterial: 7.36  pH, Blood: x     /  pCO2: 32    /  pO2: 97    / HCO3: 18    / Base Excess: -6.3  /  SaO2: 97.9              Mode: AC/ CMV (Assist Control/ Continuous Mandatory Ventilation)  RR (machine): 12  TV (machine): 550  FiO2: 100  PEEP: 6  ITime: 1  MAP: 10  PIP: 30    Intake and output was reviewed and the fluid balance was calculated  Daily     Daily   I&O's Summary    09 May 2025 07:01  -  09 May 2025 19:37  --------------------------------------------------------  IN: 0 mL / OUT: 100 mL / NET: -100 mL        All lines and drain sites were assessed  Glycemic trend was reviewedCAPILLARY BLOOD GLUCOSE        No acute change in mental status  (+) Orotracheally intubated  Auscultation of the chest reveals equal bs  Abdomen is soft  Extremities are warm and well perfused  Wounds appear clean and unremarkable  Antibiotics are periop    labs  CBC Full  -  ( 09 May 2025 17:21 )  WBC Count : 8.31 K/uL  RBC Count : 3.76 M/uL  Hemoglobin : 10.9 g/dL  Hematocrit : 33.0 %  Platelet Count - Automated : 233 K/uL  Mean Cell Volume : 87.8 fl  Mean Cell Hemoglobin : 29.0 pg  Mean Cell Hemoglobin Concentration : 33.0 g/dL  Auto Neutrophil # : x  Auto Lymphocyte # : x  Auto Monocyte # : x  Auto Eosinophil # : x  Auto Basophil # : x  Auto Neutrophil % : x  Auto Lymphocyte % : x  Auto Monocyte % : x  Auto Eosinophil % : x  Auto Basophil % : x    05-09    134[L]  |  100  |  66[H]  ----------------------------<  332[H]  4.9   |  15[L]  |  1.92[H]    Ca    8.9      09 May 2025 17:21    TPro  6.6  /  Alb  2.7[L]  /  TBili  0.6  /  DBili  x   /  AST  111[H]  /  ALT  172[H]  /  AlkPhos  118  05-09    PT/INR - ( 09 May 2025 17:21 )   PT: 11.1 sec;   INR: 0.96          PTT - ( 09 May 2025 17:21 )  PTT:30.7 sec  The current medications were reviewed   MEDICATIONS  (STANDING):  aspirin  chewable 81 milliGRAM(s) Oral daily  azithromycin  IVPB 500 milliGRAM(s) IV Intermittent once  azithromycin  IVPB      cisatracurium Injectable 10 milliGRAM(s) IV Push once  dextrose 50% Injectable 50 milliLiter(s) IV Push every 15 minutes  fentaNYL    Injectable 50 MICROGram(s) IV Push once  heparin   Injectable 5000 Unit(s) SubCutaneous every 8 hours  insulin regular Infusion 2 Unit(s)/Hr (2 mL/Hr) IV Continuous <Continuous>  norepinephrine Infusion 0.05 MICROgram(s)/kG/Min (7 mL/Hr) IV Continuous <Continuous>  piperacillin/tazobactam IVPB.- 3.375 Gram(s) IV Intermittent once  polyethylene glycol 3350 17 Gram(s) Oral every 24 hours  propofol Infusion 30 MICROgram(s)/kG/Min (13.4 mL/Hr) IV Continuous <Continuous>  senna 2 Tablet(s) Oral at bedtime  sodium chloride 0.9% lock flush 3 milliLiter(s) IV Push every 8 hours  vancomycin  IVPB 1500 milliGRAM(s) IV Intermittent once  vasopressin Infusion 0.08 Unit(s)/Min (12 mL/Hr) IV Continuous <Continuous>    MEDICATIONS  (PRN):       PROBLEM LIST/ ASSESSMENT:  HEALTH ISSUES - PROBLEM Dx:      ,   Patient is a 79y old  Male who presents with a chief complaint of bacteremia/IE   s/p acute resp failure with hemodynamic instability      My plan includes :    Titrate pressor support to maintain MAP >70  Full Vent support  Titrate Fio2 to maintain Sao2 >95  Serial ABGs  Abx for IE; ID recs  F/u Cx results  start enteral feeds  preop w/u including SHELLY;  close hemodynamic, ventilatory and drain monitoring and management per post op routine    Monitor for arrhythmias and monitor parameters for organ perfusion  monitor neurologic status  Head of the bed should remain elevated to 45 deg .   chest PT and IS will be encouraged  monitor adequacy of oxygenation and ventilation and attempt to wean oxygen  Nutritional goals will be met using po eventually , ensure adequate caloric intake and montior the same  Stress ulcer and VTE prophylaxis will be achieved    Glycemic control is satisfactory  Electrolytes have been repleted as necessary and wound care has been carried out. Pain control has been achieved.   agressive physical therapy and early mobility and ambulation goals will be met   The family was updated about the course and plan  CRITICAL CARE TIME SPENT in evaluation and management, reassessments, review and interpretation of labs and x-rays, ventilator and hemodynamic management, formulating a plan and coordinating care: ___111____ MIN.  Time does not include procedural time.  CTICU ATTENDING     					    Jeanmarie Rubi MD

## 2025-05-09 NOTE — H&P ADULT - ASSESSMENT
79M with PMHx of HTN, HLD, T2DM, acute cholecystectomy c/b pleural effusions requiring drainage who presented to [facility] with sob and weakness x 2 weeks. In ED, he was found to have elevated troponins, Cr 1.9 (baseline 0.75) [place] of [address] and transaminitisHe was hypoxic in ED with CT chest showing multifocal PNA and acute respiratory failure. He was placed on HFNC and transferred to ICU. Blood cultures were + for gram positive cocci. TTE showed reduce LVEF with thickening of AV leaflets concerning for vegetation. Steroids were started.  He was placed on Vanc/Zosyn/Azithromycin and is now transfererd to [facility] for mgmt.   - Admit for surgical evaluation of aortic valve endocarditis.   - pre-op work up.   - Intubate  - place central line and a line.   - ID consult   - Continue Vanco, Zosyn and Azithro for now.     Zahira Irwin

## 2025-05-09 NOTE — PROCEDURE NOTE - NSBRONCHHISTORY_GEN_A_CORE_FT
79M with PMHx of HTN, HLD, T2DM, acute cholecystectomy c/b pleural effusions requiring drainage who presented to [facility] with sob and weakness x 2 weeks. In ED, he was found to have elevated troponins, Cr 1.9 (baseline 0.75) [place] [address] and transaminitisHe was hypoxic in ED with CT chest showing multifocal PNA and acute respiratory failure. He was placed on HFNC and transferred to ICU. Blood cultures were + for gram positive cocci. TTE showed reduce LVEF with thickening of AV leaflets concerning for vegetation. Steroids were started.  He was placed on Vanc/Zosyn/Azithromycin and is now transfererd to [facility] for mgmt.

## 2025-05-09 NOTE — H&P ADULT - HISTORY OF PRESENT ILLNESS
79M with PMHx of HTN, HLD, T2DM, acute cholecystectomy c/b pleural effusions requiring drainage who presented to [facility] with sob and weakness x 2 weeks. In ED, he was found to have elevated troponins, Cr 1.9 (baseline 0.75) [place] of [address] and transaminitisHe was hypoxic in ED with CT chest showing multifocal PNA and acute respiratory failure. He was placed on HFNC and transferred to ICU. Blood cultures were + for gram positive cocci. TTE showed reduce LVEF with thickening of AV leaflets concerning for vegetation. Steroids were started.  He was placed on Vanc/Zosyn/Azithromycin and is now transfererd to [facility] for mgmt. H    PMHx: HTN, HLD, T2DM, Sacral wouds?   Surg Hx: Cholecystectomy, Appendectomy   Allergies: NKDA   Meds: ASA 81 mg, Fe 325 mg, lipitor 80 mg. Align 6mg PO, Trulicity 0.75 mg Qmonth, Vascepa 2g PO BID, Losartan 50 mg QD, Metformin 500 mg BID  SHx   - smoker   - drink   - drugs    MEDS at outside hosptual   Scheduled   Aspirin* (Baby Aspirin*), 81 MG PO DAILY WITH BREAKFAST   Cyanocobalamin (Vitamin B-12) (B-12), 500 MCG PO Q5PM, (Reported)   Enoxaparin Sodium (Enoxaparin Sodium), 40 MG SC DAILY@0900   Ferrous Sulfate 325MG (65MG FE) (Ridosol 325MG (65MG FE)), 1 TAB PO Q5PM, (Reported)   Hydrocortisone Sod Succinate (Solucortef 100 Mg/2 Ml (A-Hydrocort) Vial), 50 MG IVP Q6H   Insulin Regular, Human * (Humulin R *), 1 UNIT SC Q6H   Multivitamin Therapeutic (Multivitamin - Therapeutic - Tablet), 1 TABLET PO DAILY, (Reported)   Norepinephrine Bitartrate/D5w (Norepinephrine 8 mg/250 ml-D5w), 0.05 MCG IV DAILY   Piperacillin/Tazobactam/Dex-Is (Zosyn 3.375 Gm Galaxy Bag), 3.375 GM IV Q8H   Vancomycin-D5w 750 Mg/150 Ml* (Vancomycin-D5w 750 Mg/150 Ml*), 1 GM IV DAILY   Vit C/E/Zn/Coppr/Lutein/Zeaxan (Preservision Areds 2 Softgel), 1 CAP PO BID, (Reported)   [Azithromycin], 500 MG IV DAILY     Scheduled PRN   Carboxymethylcellulose Sodium (Refresh Liquigel), 1 DROP BOTH EYES Q4H PRN for DRY EYES   Saline (P.i.c.c. Saline Flush-10 Ml Syringe), 10 ML PORT Q12H PRN for PORT FLUSH     Discontinued Medications   Atorvastatin Calcium (Atorvastatin Calcium), 40 MG PO DAILY, (Reported)   Bifidobacterium Infantis (Align), 6 MG PO DAILY, (Reported)   Dulaglutide (Trulicity), 0.75 MG INJ QMON, (Reported)   Icosapent Ethyl (Vascepa), 2 GM PO BID, (Reported)   Losartan Potassium (Cozaar 50 Mg Tablet), 50 MG PO DAILY, (Reported)   Metformin (Extended Release)* (Metformin (Extended Release)*), 500 MG PO BID, (Reported)    79M with PMHx of HTN, HLD, T2DM, acute cholecystectomy c/b pleural effusions requiring drainage who presented to [facility] with sob and weakness x 2 weeks. In ED, he was found to have elevated troponins, Cr 1.9 (baseline 0.75) [place] 9[address] and transaminitisHe was hypoxic in ED with CT chest showing multifocal PNA and acute respiratory failure. He was placed on HFNC and transferred to ICU. Blood cultures were + for gram positive cocci. TTE showed reduce LVEF with thickening of AV leaflets concerning for vegetation. Steroids were started.  He was placed on Vanc/Zosyn/Azithromycin and is now transfererd to [facility] for mgmt.

## 2025-05-09 NOTE — PROCEDURE NOTE - NSBRONCHFINDINGS_GEN_A_CORE_FT
R lung: thin brown secretions in RLL and RML   L lung: no secretions appreciated     ET tube roughly 8cm above karl, advance to level 3 tracheal rings above karl per Dr. Morgan

## 2025-05-10 LAB
ALBUMIN SERPL ELPH-MCNC: 2.3 G/DL — LOW (ref 3.3–5)
ALBUMIN SERPL ELPH-MCNC: 2.5 G/DL — LOW (ref 3.3–5)
ALP SERPL-CCNC: 106 U/L — SIGNIFICANT CHANGE UP (ref 40–120)
ALP SERPL-CCNC: 108 U/L — SIGNIFICANT CHANGE UP (ref 40–120)
ALP SERPL-CCNC: 111 U/L — SIGNIFICANT CHANGE UP (ref 40–120)
ALP SERPL-CCNC: 114 U/L — SIGNIFICANT CHANGE UP (ref 40–120)
ALT FLD-CCNC: 281 U/L — HIGH (ref 10–45)
ALT FLD-CCNC: 297 U/L — HIGH (ref 10–45)
ALT FLD-CCNC: 302 U/L — HIGH (ref 10–45)
ALT FLD-CCNC: 304 U/L — HIGH (ref 10–45)
ANION GAP SERPL CALC-SCNC: 13 MMOL/L — SIGNIFICANT CHANGE UP (ref 5–17)
ANION GAP SERPL CALC-SCNC: 15 MMOL/L — SIGNIFICANT CHANGE UP (ref 5–17)
ANION GAP SERPL CALC-SCNC: 18 MMOL/L — HIGH (ref 5–17)
ANION GAP SERPL CALC-SCNC: 19 MMOL/L — HIGH (ref 5–17)
APTT BLD: 28 SEC — SIGNIFICANT CHANGE UP (ref 26.1–36.8)
APTT BLD: 28.5 SEC — SIGNIFICANT CHANGE UP (ref 26.1–36.8)
APTT BLD: 28.6 SEC — SIGNIFICANT CHANGE UP (ref 26.1–36.8)
APTT BLD: 28.7 SEC — SIGNIFICANT CHANGE UP (ref 26.1–36.8)
AST SERPL-CCNC: 337 U/L — HIGH (ref 10–40)
AST SERPL-CCNC: 384 U/L — HIGH (ref 10–40)
AST SERPL-CCNC: 415 U/L — HIGH (ref 10–40)
AST SERPL-CCNC: 428 U/L — HIGH (ref 10–40)
BASE EXCESS BLDV CALC-SCNC: -0.4 MMOL/L — SIGNIFICANT CHANGE UP (ref -2–3)
BASE EXCESS BLDV CALC-SCNC: -2 MMOL/L — SIGNIFICANT CHANGE UP (ref -2–3)
BASE EXCESS BLDV CALC-SCNC: -5.9 MMOL/L — LOW (ref -2–3)
BASE EXCESS BLDV CALC-SCNC: -7.1 MMOL/L — LOW (ref -2–3)
BASE EXCESS BLDV CALC-SCNC: 0.7 MMOL/L — SIGNIFICANT CHANGE UP (ref -2–3)
BILIRUB SERPL-MCNC: 0.5 MG/DL — SIGNIFICANT CHANGE UP (ref 0.2–1.2)
BILIRUB SERPL-MCNC: 0.6 MG/DL — SIGNIFICANT CHANGE UP (ref 0.2–1.2)
BUN SERPL-MCNC: 53 MG/DL — HIGH (ref 7–23)
BUN SERPL-MCNC: 55 MG/DL — HIGH (ref 7–23)
BUN SERPL-MCNC: 60 MG/DL — HIGH (ref 7–23)
BUN SERPL-MCNC: 63 MG/DL — HIGH (ref 7–23)
CALCIUM SERPL-MCNC: 8.2 MG/DL — LOW (ref 8.4–10.5)
CALCIUM SERPL-MCNC: 8.2 MG/DL — LOW (ref 8.4–10.5)
CALCIUM SERPL-MCNC: 8.3 MG/DL — LOW (ref 8.4–10.5)
CALCIUM SERPL-MCNC: 8.4 MG/DL — SIGNIFICANT CHANGE UP (ref 8.4–10.5)
CHLORIDE SERPL-SCNC: 100 MMOL/L — SIGNIFICANT CHANGE UP (ref 96–108)
CHLORIDE SERPL-SCNC: 101 MMOL/L — SIGNIFICANT CHANGE UP (ref 96–108)
CHLORIDE SERPL-SCNC: 99 MMOL/L — SIGNIFICANT CHANGE UP (ref 96–108)
CHLORIDE SERPL-SCNC: 99 MMOL/L — SIGNIFICANT CHANGE UP (ref 96–108)
CO2 BLDV-SCNC: 17 MMOL/L — LOW (ref 22–26)
CO2 BLDV-SCNC: 20 MMOL/L — LOW (ref 22–26)
CO2 BLDV-SCNC: 23 MMOL/L — SIGNIFICANT CHANGE UP (ref 22–26)
CO2 BLDV-SCNC: 26 MMOL/L — SIGNIFICANT CHANGE UP (ref 22–26)
CO2 BLDV-SCNC: 27 MMOL/L — HIGH (ref 22–26)
CO2 SERPL-SCNC: 14 MMOL/L — LOW (ref 22–31)
CO2 SERPL-SCNC: 20 MMOL/L — LOW (ref 22–31)
CO2 SERPL-SCNC: 22 MMOL/L — SIGNIFICANT CHANGE UP (ref 22–31)
CO2 SERPL-SCNC: 25 MMOL/L — SIGNIFICANT CHANGE UP (ref 22–31)
CREAT SERPL-MCNC: 2.04 MG/DL — HIGH (ref 0.5–1.3)
CREAT SERPL-MCNC: 2.05 MG/DL — HIGH (ref 0.5–1.3)
CREAT SERPL-MCNC: 2.06 MG/DL — HIGH (ref 0.5–1.3)
CREAT SERPL-MCNC: 2.07 MG/DL — HIGH (ref 0.5–1.3)
EGFR: 32 ML/MIN/1.73M2 — LOW
EGFR: 33 ML/MIN/1.73M2 — LOW
EGFR: 33 ML/MIN/1.73M2 — LOW
GAS PNL BLDA: SIGNIFICANT CHANGE UP
GAS PNL BLDV: SIGNIFICANT CHANGE UP
GLUCOSE SERPL-MCNC: 113 MG/DL — HIGH (ref 70–99)
GLUCOSE SERPL-MCNC: 172 MG/DL — HIGH (ref 70–99)
GLUCOSE SERPL-MCNC: 182 MG/DL — HIGH (ref 70–99)
GLUCOSE SERPL-MCNC: 95 MG/DL — SIGNIFICANT CHANGE UP (ref 70–99)
GRAM STN FLD: ABNORMAL
GRAM STN FLD: SIGNIFICANT CHANGE UP
HCO3 BLDV-SCNC: 16 MMOL/L — LOW (ref 22–29)
HCO3 BLDV-SCNC: 19 MMOL/L — LOW (ref 22–29)
HCO3 BLDV-SCNC: 22 MMOL/L — SIGNIFICANT CHANGE UP (ref 22–29)
HCO3 BLDV-SCNC: 25 MMOL/L — SIGNIFICANT CHANGE UP (ref 22–29)
HCO3 BLDV-SCNC: 25 MMOL/L — SIGNIFICANT CHANGE UP (ref 22–29)
HCT VFR BLD CALC: 29.5 % — LOW (ref 39–50)
HCT VFR BLD CALC: 29.6 % — LOW (ref 39–50)
HCT VFR BLD CALC: 29.7 % — LOW (ref 39–50)
HCT VFR BLD CALC: 30 % — LOW (ref 39–50)
HGB BLD-MCNC: 10 G/DL — LOW (ref 13–17)
HGB BLD-MCNC: 9.9 G/DL — LOW (ref 13–17)
INR BLD: 1.03 — SIGNIFICANT CHANGE UP (ref 0.85–1.16)
INR BLD: 1.04 — SIGNIFICANT CHANGE UP (ref 0.85–1.16)
INR BLD: 1.07 — SIGNIFICANT CHANGE UP (ref 0.85–1.16)
INR BLD: 1.08 — SIGNIFICANT CHANGE UP (ref 0.85–1.16)
LACTATE SERPL-SCNC: 1.5 MMOL/L — SIGNIFICANT CHANGE UP (ref 0.5–2)
LACTATE SERPL-SCNC: 1.6 MMOL/L — SIGNIFICANT CHANGE UP (ref 0.5–2)
LACTATE SERPL-SCNC: 2.3 MMOL/L — HIGH (ref 0.5–2)
LACTATE SERPL-SCNC: 3.5 MMOL/L — HIGH (ref 0.5–2)
LEGIONELLA AG UR QL: NEGATIVE — SIGNIFICANT CHANGE UP
MAGNESIUM SERPL-MCNC: 1.8 MG/DL — SIGNIFICANT CHANGE UP (ref 1.6–2.6)
MAGNESIUM SERPL-MCNC: 1.9 MG/DL — SIGNIFICANT CHANGE UP (ref 1.6–2.6)
MAGNESIUM SERPL-MCNC: 2.2 MG/DL — SIGNIFICANT CHANGE UP (ref 1.6–2.6)
MAGNESIUM SERPL-MCNC: 2.5 MG/DL — SIGNIFICANT CHANGE UP (ref 1.6–2.6)
MCHC RBC-ENTMCNC: 28.8 PG — SIGNIFICANT CHANGE UP (ref 27–34)
MCHC RBC-ENTMCNC: 28.9 PG — SIGNIFICANT CHANGE UP (ref 27–34)
MCHC RBC-ENTMCNC: 29.2 PG — SIGNIFICANT CHANGE UP (ref 27–34)
MCHC RBC-ENTMCNC: 29.3 PG — SIGNIFICANT CHANGE UP (ref 27–34)
MCHC RBC-ENTMCNC: 33.3 G/DL — SIGNIFICANT CHANGE UP (ref 32–36)
MCHC RBC-ENTMCNC: 33.3 G/DL — SIGNIFICANT CHANGE UP (ref 32–36)
MCHC RBC-ENTMCNC: 33.4 G/DL — SIGNIFICANT CHANGE UP (ref 32–36)
MCHC RBC-ENTMCNC: 33.6 G/DL — SIGNIFICANT CHANGE UP (ref 32–36)
MCV RBC AUTO: 86 FL — SIGNIFICANT CHANGE UP (ref 80–100)
MCV RBC AUTO: 86.8 FL — SIGNIFICANT CHANGE UP (ref 80–100)
MCV RBC AUTO: 87.3 FL — SIGNIFICANT CHANGE UP (ref 80–100)
MCV RBC AUTO: 87.5 FL — SIGNIFICANT CHANGE UP (ref 80–100)
NRBC BLD AUTO-RTO: 0 /100 WBCS — SIGNIFICANT CHANGE UP (ref 0–0)
PCO2 BLDV: 27 MMHG — LOW (ref 42–55)
PCO2 BLDV: 33 MMHG — LOW (ref 42–55)
PCO2 BLDV: 36 MMHG — LOW (ref 42–55)
PCO2 BLDV: 40 MMHG — LOW (ref 42–55)
PCO2 BLDV: 42 MMHG — SIGNIFICANT CHANGE UP (ref 42–55)
PH BLDV: 7.36 — SIGNIFICANT CHANGE UP (ref 7.32–7.43)
PH BLDV: 7.38 — SIGNIFICANT CHANGE UP (ref 7.32–7.43)
PH BLDV: 7.39 — SIGNIFICANT CHANGE UP (ref 7.32–7.43)
PH BLDV: 7.4 — SIGNIFICANT CHANGE UP (ref 7.32–7.43)
PH BLDV: 7.41 — SIGNIFICANT CHANGE UP (ref 7.32–7.43)
PHOSPHATE SERPL-MCNC: 4.1 MG/DL — SIGNIFICANT CHANGE UP (ref 2.5–4.5)
PHOSPHATE SERPL-MCNC: 5 MG/DL — HIGH (ref 2.5–4.5)
PHOSPHATE SERPL-MCNC: 5.2 MG/DL — HIGH (ref 2.5–4.5)
PHOSPHATE SERPL-MCNC: 5.3 MG/DL — HIGH (ref 2.5–4.5)
PLATELET # BLD AUTO: 163 K/UL — SIGNIFICANT CHANGE UP (ref 150–400)
PLATELET # BLD AUTO: 163 K/UL — SIGNIFICANT CHANGE UP (ref 150–400)
PLATELET # BLD AUTO: 173 K/UL — SIGNIFICANT CHANGE UP (ref 150–400)
PLATELET # BLD AUTO: 176 K/UL — SIGNIFICANT CHANGE UP (ref 150–400)
PO2 BLDV: 163 MMHG — HIGH (ref 25–45)
PO2 BLDV: 38 MMHG — SIGNIFICANT CHANGE UP (ref 25–45)
PO2 BLDV: 41 MMHG — SIGNIFICANT CHANGE UP (ref 25–45)
PO2 BLDV: 41 MMHG — SIGNIFICANT CHANGE UP (ref 25–45)
PO2 BLDV: <33 MMHG — SIGNIFICANT CHANGE UP (ref 25–45)
POTASSIUM SERPL-MCNC: 3.6 MMOL/L — SIGNIFICANT CHANGE UP (ref 3.5–5.3)
POTASSIUM SERPL-MCNC: 3.9 MMOL/L — SIGNIFICANT CHANGE UP (ref 3.5–5.3)
POTASSIUM SERPL-MCNC: 4 MMOL/L — SIGNIFICANT CHANGE UP (ref 3.5–5.3)
POTASSIUM SERPL-MCNC: 4.1 MMOL/L — SIGNIFICANT CHANGE UP (ref 3.5–5.3)
POTASSIUM SERPL-SCNC: 3.6 MMOL/L — SIGNIFICANT CHANGE UP (ref 3.5–5.3)
POTASSIUM SERPL-SCNC: 3.9 MMOL/L — SIGNIFICANT CHANGE UP (ref 3.5–5.3)
POTASSIUM SERPL-SCNC: 4 MMOL/L — SIGNIFICANT CHANGE UP (ref 3.5–5.3)
POTASSIUM SERPL-SCNC: 4.1 MMOL/L — SIGNIFICANT CHANGE UP (ref 3.5–5.3)
PROT SERPL-MCNC: 5.3 G/DL — LOW (ref 6–8.3)
PROT SERPL-MCNC: 5.7 G/DL — LOW (ref 6–8.3)
PROT SERPL-MCNC: 5.7 G/DL — LOW (ref 6–8.3)
PROT SERPL-MCNC: 5.8 G/DL — LOW (ref 6–8.3)
PROTHROM AB SERPL-ACNC: 12.1 SEC — SIGNIFICANT CHANGE UP (ref 9.9–13.4)
PROTHROM AB SERPL-ACNC: 12.2 SEC — SIGNIFICANT CHANGE UP (ref 9.9–13.4)
PROTHROM AB SERPL-ACNC: 12.4 SEC — SIGNIFICANT CHANGE UP (ref 9.9–13.4)
PROTHROM AB SERPL-ACNC: 12.5 SEC — SIGNIFICANT CHANGE UP (ref 9.9–13.4)
RBC # BLD: 3.38 M/UL — LOW (ref 4.2–5.8)
RBC # BLD: 3.42 M/UL — LOW (ref 4.2–5.8)
RBC # BLD: 3.43 M/UL — LOW (ref 4.2–5.8)
RBC # BLD: 3.44 M/UL — LOW (ref 4.2–5.8)
RBC # FLD: 17.8 % — HIGH (ref 10.3–14.5)
RBC # FLD: 17.8 % — HIGH (ref 10.3–14.5)
RBC # FLD: 17.9 % — HIGH (ref 10.3–14.5)
RBC # FLD: 18.2 % — HIGH (ref 10.3–14.5)
S PNEUM AG UR QL: NEGATIVE — SIGNIFICANT CHANGE UP
SAO2 % BLDV: 55 % — LOW (ref 67–88)
SAO2 % BLDV: 58.3 % — LOW (ref 67–88)
SAO2 % BLDV: 68.4 % — SIGNIFICANT CHANGE UP (ref 67–88)
SAO2 % BLDV: 73.9 % — SIGNIFICANT CHANGE UP (ref 67–88)
SAO2 % BLDV: 99.1 % — HIGH (ref 67–88)
SODIUM SERPL-SCNC: 133 MMOL/L — LOW (ref 135–145)
SODIUM SERPL-SCNC: 137 MMOL/L — SIGNIFICANT CHANGE UP (ref 135–145)
SODIUM SERPL-SCNC: 137 MMOL/L — SIGNIFICANT CHANGE UP (ref 135–145)
SODIUM SERPL-SCNC: 138 MMOL/L — SIGNIFICANT CHANGE UP (ref 135–145)
SPECIMEN SOURCE: SIGNIFICANT CHANGE UP
SPECIMEN SOURCE: SIGNIFICANT CHANGE UP
WBC # BLD: 6.94 K/UL — SIGNIFICANT CHANGE UP (ref 3.8–10.5)
WBC # BLD: 7.3 K/UL — SIGNIFICANT CHANGE UP (ref 3.8–10.5)
WBC # BLD: 7.75 K/UL — SIGNIFICANT CHANGE UP (ref 3.8–10.5)
WBC # BLD: 8.73 K/UL — SIGNIFICANT CHANGE UP (ref 3.8–10.5)
WBC # FLD AUTO: 6.94 K/UL — SIGNIFICANT CHANGE UP (ref 3.8–10.5)
WBC # FLD AUTO: 7.3 K/UL — SIGNIFICANT CHANGE UP (ref 3.8–10.5)
WBC # FLD AUTO: 7.75 K/UL — SIGNIFICANT CHANGE UP (ref 3.8–10.5)
WBC # FLD AUTO: 8.73 K/UL — SIGNIFICANT CHANGE UP (ref 3.8–10.5)

## 2025-05-10 PROCEDURE — 76705 ECHO EXAM OF ABDOMEN: CPT | Mod: 26

## 2025-05-10 PROCEDURE — 99223 1ST HOSP IP/OBS HIGH 75: CPT

## 2025-05-10 PROCEDURE — 99292 CRITICAL CARE ADDL 30 MIN: CPT | Mod: 25

## 2025-05-10 PROCEDURE — 36556 INSERT NON-TUNNEL CV CATH: CPT

## 2025-05-10 PROCEDURE — 76937 US GUIDE VASCULAR ACCESS: CPT | Mod: 26,LT

## 2025-05-10 PROCEDURE — 71045 X-RAY EXAM CHEST 1 VIEW: CPT | Mod: 26

## 2025-05-10 PROCEDURE — 99291 CRITICAL CARE FIRST HOUR: CPT | Mod: 25

## 2025-05-10 PROCEDURE — G0545: CPT

## 2025-05-10 PROCEDURE — 93503 INSERT/PLACE HEART CATHETER: CPT

## 2025-05-10 PROCEDURE — 99292 CRITICAL CARE ADDL 30 MIN: CPT

## 2025-05-10 RX ORDER — FENTANYL CITRATE-0.9 % NACL/PF 100MCG/2ML
25 SYRINGE (ML) INTRAVENOUS ONCE
Refills: 0 | Status: DISCONTINUED | OUTPATIENT
Start: 2025-05-10 | End: 2025-05-10

## 2025-05-10 RX ORDER — AMPICILLIN SODIUM 1 G/1
2 INJECTION, POWDER, FOR SOLUTION INTRAMUSCULAR; INTRAVENOUS EVERY 6 HOURS
Refills: 0 | Status: DISCONTINUED | OUTPATIENT
Start: 2025-05-10 | End: 2025-05-15

## 2025-05-10 RX ORDER — FENTANYL CITRATE-0.9 % NACL/PF 100MCG/2ML
25 SYRINGE (ML) INTRAVENOUS EVERY 4 HOURS
Refills: 0 | Status: DISCONTINUED | OUTPATIENT
Start: 2025-05-10 | End: 2025-05-13

## 2025-05-10 RX ORDER — CEFTRIAXONE 500 MG/1
2000 INJECTION, POWDER, FOR SOLUTION INTRAMUSCULAR; INTRAVENOUS EVERY 12 HOURS
Refills: 0 | Status: DISCONTINUED | OUTPATIENT
Start: 2025-05-10 | End: 2025-05-15

## 2025-05-10 RX ORDER — NOREPINEPHRINE BITARTRATE 8 MG
0.05 SOLUTION INTRAVENOUS
Qty: 16 | Refills: 0 | Status: DISCONTINUED | OUTPATIENT
Start: 2025-05-10 | End: 2025-05-13

## 2025-05-10 RX ORDER — DEXMEDETOMIDINE HYDROCHLORIDE IN SODIUM CHLORIDE 4 UG/ML
0.3 INJECTION INTRAVENOUS
Qty: 400 | Refills: 0 | Status: DISCONTINUED | OUTPATIENT
Start: 2025-05-10 | End: 2025-05-12

## 2025-05-10 RX ORDER — SODIUM BICARBONATE 1 MEQ/ML
50 SYRINGE (ML) INTRAVENOUS
Refills: 0 | Status: COMPLETED | OUTPATIENT
Start: 2025-05-10 | End: 2025-05-10

## 2025-05-10 RX ORDER — MAGNESIUM SULFATE 500 MG/ML
2 SYRINGE (ML) INJECTION ONCE
Refills: 0 | Status: COMPLETED | OUTPATIENT
Start: 2025-05-10 | End: 2025-05-10

## 2025-05-10 RX ORDER — AMPICILLIN SODIUM 1 G/1
2 INJECTION, POWDER, FOR SOLUTION INTRAMUSCULAR; INTRAVENOUS ONCE
Refills: 0 | Status: COMPLETED | OUTPATIENT
Start: 2025-05-10 | End: 2025-05-10

## 2025-05-10 RX ORDER — BUMETANIDE 1 MG/1
1 TABLET ORAL ONCE
Refills: 0 | Status: COMPLETED | OUTPATIENT
Start: 2025-05-10 | End: 2025-05-10

## 2025-05-10 RX ORDER — PHENYLEPHRINE HCL IN 0.9% NACL 0.5 MG/5ML
0.5 SYRINGE (ML) INTRAVENOUS
Qty: 160 | Refills: 0 | Status: DISCONTINUED | OUTPATIENT
Start: 2025-05-10 | End: 2025-05-13

## 2025-05-10 RX ORDER — AMPICILLIN SODIUM 1 G/1
INJECTION, POWDER, FOR SOLUTION INTRAMUSCULAR; INTRAVENOUS
Refills: 0 | Status: DISCONTINUED | OUTPATIENT
Start: 2025-05-10 | End: 2025-05-15

## 2025-05-10 RX ADMIN — Medication 25 GRAM(S): at 05:14

## 2025-05-10 RX ADMIN — Medication 25 MICROGRAM(S): at 19:06

## 2025-05-10 RX ADMIN — BUMETANIDE 1 MILLIGRAM(S): 1 TABLET ORAL at 06:25

## 2025-05-10 RX ADMIN — DEXMEDETOMIDINE HYDROCHLORIDE IN SODIUM CHLORIDE 5.6 MICROGRAM(S)/KG/HR: 4 INJECTION INTRAVENOUS at 23:55

## 2025-05-10 RX ADMIN — HEPARIN SODIUM 5000 UNIT(S): 1000 INJECTION INTRAVENOUS; SUBCUTANEOUS at 05:14

## 2025-05-10 RX ADMIN — PROPOFOL 13.4 MICROGRAM(S)/KG/MIN: 10 INJECTION, EMULSION INTRAVENOUS at 23:50

## 2025-05-10 RX ADMIN — PROPOFOL 13.4 MICROGRAM(S)/KG/MIN: 10 INJECTION, EMULSION INTRAVENOUS at 04:00

## 2025-05-10 RX ADMIN — FUROSEMIDE 2.5 MG/HR: 10 INJECTION INTRAMUSCULAR; INTRAVENOUS at 00:39

## 2025-05-10 RX ADMIN — Medication 25 MICROGRAM(S): at 17:47

## 2025-05-10 RX ADMIN — Medication 3 MILLILITER(S): at 14:25

## 2025-05-10 RX ADMIN — Medication 50 MILLIEQUIVALENT(S): at 06:25

## 2025-05-10 RX ADMIN — Medication 100 MILLIEQUIVALENT(S): at 20:01

## 2025-05-10 RX ADMIN — Medication 14 MICROGRAM(S)/KG/MIN: at 11:49

## 2025-05-10 RX ADMIN — CEFTRIAXONE 100 MILLIGRAM(S): 500 INJECTION, POWDER, FOR SOLUTION INTRAMUSCULAR; INTRAVENOUS at 19:20

## 2025-05-10 RX ADMIN — PROPOFOL 13.4 MICROGRAM(S)/KG/MIN: 10 INJECTION, EMULSION INTRAVENOUS at 17:23

## 2025-05-10 RX ADMIN — HEPARIN SODIUM 5000 UNIT(S): 1000 INJECTION INTRAVENOUS; SUBCUTANEOUS at 21:54

## 2025-05-10 RX ADMIN — Medication 5 UNIT(S)/HR: at 04:01

## 2025-05-10 RX ADMIN — VASOPRESSIN 12 UNIT(S)/MIN: 20 INJECTION INTRAVENOUS at 03:02

## 2025-05-10 RX ADMIN — Medication 100 MILLIEQUIVALENT(S): at 17:22

## 2025-05-10 RX ADMIN — Medication 25 GRAM(S): at 07:22

## 2025-05-10 RX ADMIN — PROPOFOL 13.4 MICROGRAM(S)/KG/MIN: 10 INJECTION, EMULSION INTRAVENOUS at 11:49

## 2025-05-10 RX ADMIN — Medication 3 MILLILITER(S): at 21:42

## 2025-05-10 RX ADMIN — AMPICILLIN SODIUM 216 GRAM(S): 1 INJECTION, POWDER, FOR SOLUTION INTRAMUSCULAR; INTRAVENOUS at 18:13

## 2025-05-10 RX ADMIN — Medication 50 MILLIEQUIVALENT(S): at 21:34

## 2025-05-10 RX ADMIN — Medication 15 MILLILITER(S): at 17:22

## 2025-05-10 RX ADMIN — AMPICILLIN SODIUM 216 GRAM(S): 1 INJECTION, POWDER, FOR SOLUTION INTRAMUSCULAR; INTRAVENOUS at 23:50

## 2025-05-10 RX ADMIN — Medication 50 MICROGRAM(S): at 09:22

## 2025-05-10 RX ADMIN — Medication 81 MILLIGRAM(S): at 11:49

## 2025-05-10 RX ADMIN — Medication 15 MILLILITER(S): at 05:14

## 2025-05-10 RX ADMIN — Medication 3 MILLILITER(S): at 06:33

## 2025-05-10 RX ADMIN — Medication 50 MICROGRAM(S): at 10:46

## 2025-05-10 RX ADMIN — Medication 50 MILLIEQUIVALENT(S): at 08:19

## 2025-05-10 RX ADMIN — AMPICILLIN SODIUM 216 GRAM(S): 1 INJECTION, POWDER, FOR SOLUTION INTRAMUSCULAR; INTRAVENOUS at 14:23

## 2025-05-10 NOTE — CONSULT NOTE ADULT - ATTENDING COMMENTS
I agree with the fellow's findings and plans as written above with the following additions/amendments:    Seen and examined at bedside now intubated, hx limited, reviewed as above, responding wlel to diuretic, will continue, further recs as above
79M w/ PMHx of DM, HTN, HLD, hx of hospitalization in [place] (1/2025, 2/2025) for sepsis 2/2 acute   cholecystitis (s/p cholecystectomy), c/b postsurgical hematoma, b/l pleural effusions s/p thoracentesis of R pleural effusion on 3/3/25 at [facility] [pleural fluid and BCx neg at the time].  Pt son is an ED RN at [employer] and tells me that pt had Enterococcal bacteremia while he was hospitalized in [place]. Source felt to be GI/cholecystitis at the time. Unsure of what Abx were given back then and for how long.   He subsequently went to rehab and eventually returned to [facility]. Was taken to [facility] for weakness and dyspnea x 2 wks with significant worsening 2d pta.  There he denied fever, chills, chest pain, nausea, vomiting, abdominal pain, diarrhea, dysuria; reported decreased   urination. There, he was tachypneic with O2 sats in the 80s on RA and low 90s on 4L O2 via NC. CXR with fluid overload vs pneumonia. WBC WNL with N predom, UA neg, UCx w <10k UGF.   CT c/a/p showed Interval development of multifocal central consolidative opacities throughout the lungs, particularly the upper lobes and left lower lobe, R pleural effusion was noted to have dec in size, new small L pleural effusion (complex per HIE crit care note), significant interval decrease in size of the subcapsular hematoma along the inferior aspect of the liver.  Patient reportedly met sepsis criteria and was admitted to the ICU for sepsis and was in resp distress. Was given Vanc/Piptazo/Azithro + stress steroids. Was on bipap.   5/8 BCx + (4/4 bottles) w GPC speciated as E fecalis (S- Amp, Vanco).   5/9 BCx + (4/4) w GPC in pairs and chains.   Echo with aortic valve vegetations, EF 40-45%  Xferred to [facility] for possible surgical mgmt of AoV endocarditis and critical care, has now been intubated. On dobutamine and vasopressin.  #Enterococcal bacteremia and AoV (NV) endocarditis  - Please send daily BCx until neg for 72 hr  - F/u surveillance BCx  - SHELLY when able  - Start IV Ampicillin 2 gm q6h (renally adjusted)  - Start IV Ceftriaxone 2gm q12h   - F/u CTS reg surg mgmt of IE (if any)  D/w primary team

## 2025-05-10 NOTE — CONSULT NOTE ADULT - SUBJECTIVE AND OBJECTIVE BOX
INFECTIOUS DISEASES INITIAL CONSULT NOTE    HPI:  79M with PMHx of HTN, HLD, T2DM, acute cholecystectomy c/b pleural effusions requiring drainage who presented to [facility] with sob and weakness x 2 weeks. In ED, he was found to have elevated troponins, Cr 1.9 (baseline 0.75) [place] [address] and transaminitisHe was hypoxic in ED with CT chest showing multifocal PNA and acute respiratory failure. He was placed on HFNC and transferred to ICU. Blood cultures were + for gram positive cocci. TTE showed reduce LVEF with thickening of AV leaflets concerning for vegetation. Steroids were started.  He was placed on Vanc/Zosyn/Azithromycin and is now transfererd to [facility] for mgmt.      (09 May 2025 14:11)      PAST MEDICAL & SURGICAL HISTORY:  HTN (hypertension)      HLD (hyperlipidemia)      Pneumonia      Aortic valve endocarditis      H/O appendicostomy      History of cholecystectomy            Review of Systems:   Constitutional, eyes, ENT, cardiovascular, respiratory, gastrointestinal, genitourinary, integumentary, neurological, psychiatric and heme/lymph are otherwise negative other than noted above       ANTIBIOTICS:  MEDICATIONS  (STANDING):  ampicillin  IVPB      ampicillin  IVPB 2 Gram(s) IV Intermittent every 6 hours  aspirin  chewable 81 milliGRAM(s) Oral daily  cefTRIAXone   IVPB 2000 milliGRAM(s) IV Intermittent every 12 hours  chlorhexidine 0.12% Liquid 15 milliLiter(s) Oral Mucosa every 12 hours  dextrose 50% Injectable 50 milliLiter(s) IV Push every 15 minutes  DOBUTamine Infusion 5 MICROgram(s)/kG/Min (11.2 mL/Hr) IV Continuous <Continuous>  furosemide Infusion 5 mG/Hr (2.5 mL/Hr) IV Continuous <Continuous>  heparin   Injectable 5000 Unit(s) SubCutaneous every 8 hours  insulin regular Infusion 5 Unit(s)/Hr (5 mL/Hr) IV Continuous <Continuous>  norepinephrine Infusion 0.05 MICROgram(s)/kG/Min (7 mL/Hr) IV Continuous <Continuous>  phenylephrine    Infusion 0.5 MICROgram(s)/kG/Min (14 mL/Hr) IV Continuous <Continuous>  polyethylene glycol 3350 17 Gram(s) Oral every 24 hours  potassium chloride  20 mEq/100 mL IVPB 20 milliEquivalent(s) IV Intermittent every 2 hours  propofol Infusion 30 MICROgram(s)/kG/Min (13.4 mL/Hr) IV Continuous <Continuous>  senna 2 Tablet(s) Oral at bedtime  sodium bicarbonate  Injectable 50 milliEquivalent(s) IV Push every 5 minutes  sodium chloride 0.9% lock flush 3 milliLiter(s) IV Push every 8 hours  vasopressin Infusion 0.08 Unit(s)/Min (12 mL/Hr) IV Continuous <Continuous>    MEDICATIONS  (PRN):      Allergies    No Known Allergies    Intolerances        SOCIAL HISTORY:    FAMILY HISTORY:   no FH leading to current infection    Vital Signs Last 24 Hrs  T(C): 36.4 (10 May 2025 14:14), Max: 36.4 (10 May 2025 14:14)  T(F): 97.5 (10 May 2025 14:14), Max: 97.5 (10 May 2025 14:14)  HR: 91 (10 May 2025 16:00) (70 - 102)  BP: 158/51 (09 May 2025 17:17) (158/51 - 158/51)  BP(mean): 81 (09 May 2025 17:17) (81 - 81)  RR: 12 (10 May 2025 16:00) (12 - 20)  SpO2: 99% (10 May 2025 16:00) (93% - 100%)    Parameters below as of 10 May 2025 16:00  Patient On (Oxygen Delivery Method): ventilator    O2 Concentration (%): 50    05-09-25 @ 07:01  -  05-10-25 @ 07:00  --------------------------------------------------------  IN: 1810.8 mL / OUT: 635 mL / NET: 1175.8 mL    05-10-25 @ 07:01  -  05-10-25 @ 17:15  --------------------------------------------------------  IN: 577.7 mL / OUT: 1040 mL / NET: -462.3 mL        PHYSICAL EXAM:  Constitutional: intubated, sedated  Neck: the appearance of the neck was normal and the neck was supple.   Pulmonary: no respiratory distress and lungs were clear to auscultation bilaterally.   Heart: heart rate was normal and rhythm regular, normal S1 and S2  Vascular: there was no peripheral edema  Abdomen: normal bowel sounds, soft, non-tender  Psychiatric: the affect was normal      LABS:                        9.9    6.94  )-----------( 163      ( 10 May 2025 15:57 )             29.7     05-10    138  |  101  |  60[H]  ----------------------------<  113[H]  3.6   |  22  |  2.07[H]    Ca    8.4      10 May 2025 15:57  Phos  5.0     05-10  Mg     2.2     05-10    TPro  5.7[L]  /  Alb  2.5[L]  /  TBili  0.6  /  DBili  x   /  AST  415[H]  /  ALT  297[H]  /  AlkPhos  108  05-10    PT/INR - ( 10 May 2025 15:57 )   PT: 12.2 sec;   INR: 1.04          PTT - ( 10 May 2025 15:57 )  PTT:28.0 sec  Urinalysis Basic - ( 10 May 2025 15:57 )    Color: x / Appearance: x / SG: x / pH: x  Gluc: 113 mg/dL / Ketone: x  / Bili: x / Urobili: x   Blood: x / Protein: x / Nitrite: x   Leuk Esterase: x / RBC: x / WBC x   Sq Epi: x / Non Sq Epi: x / Bacteria: x        MICROBIOLOGY:    RADIOLOGY & ADDITIONAL STUDIES:

## 2025-05-10 NOTE — PROGRESS NOTE ADULT - SUBJECTIVE AND OBJECTIVE BOX
CTICU  CRITICAL  CARE  attending     Hand off received 					   Pertinent clinical, laboratory, radiographic, hemodynamic, echocardiographic, respiratory data, microbiologic data and chart were reviewed and analyzed frequently throughout the course of the day and night  Patient seen and examined with CTS/ SH attending at bedside      Pt is a 79y , Male, ICU day # 2 ; a/w acute IE; AV vegetations    Acute hypoxic resp failure; intubated and placed on Vent support soon after admission    Overnight into today am:    2nd pressor agent added  Dobutamine added    currently:    remains on full vent support  multiple pressor/inotrope support  E fecalis bacteremia    PAC inserted for hemodynamic monitoring:    PAP 26-28/14  CI 2.0    Vasoactive infusions    Dobutamine  Vasopressin  Levophed    79M with PMHx of HTN, HLD, T2DM, acute cholecystectomy c/b pleural effusions requiring drainage who presented to [facility] with sob and weakness x 2 weeks. In ED, he was found to have elevated troponins, Cr 1.9 (baseline 0.75) [place] of [address] and transaminitisHe was hypoxic in ED with CT chest showing multifocal PNA and acute respiratory failure. He was placed on HFNC and transferred to ICU. Blood cultures were + for gram positive cocci. TTE showed reduce LVEF with thickening of AV leaflets concerning for vegetation. Steroids were started.  He was placed on Vanc/Zosyn/Azithromycin and is now transfererd to [facility] for mgmt.       , FAMILY HISTORY:  PAST MEDICAL & SURGICAL HISTORY:  HTN (hypertension)      HLD (hyperlipidemia)      Pneumonia      Aortic valve endocarditis      H/O appendicostomy      History of cholecystectomy        Patient is a 79y old  Male who presents with a chief complaint of AORTIC VALVE ENDOCARDITIS    AORTIC VALVE ENDOCARDITIS     (11 May 2025 18:34)      14 system review unable to assess  acute changes include acute respiratory failure  Vital signs, hemodynamic and respiratory parameters were reviewed from the bedside nursing flowsheet.  ICU Vital Signs Last 24 Hrs  T(C): 37 (14 May 2025 22:22), Max: 37.4 (14 May 2025 05:24)  T(F): 98.6 (14 May 2025 22:22), Max: 99.4 (14 May 2025 05:24)  HR: 88 (15 May 2025 01:00) (87 - 106)  BP: --  BP(mean): --  ABP: 120/37 (15 May 2025 01:00) (105/32 - 154/43)  ABP(mean): 65 (15 May 2025 01:00) (56 - 79)  RR: 18 (15 May 2025 00:05) (14 - 20)  SpO2: 95% (15 May 2025 01:00) (95% - 99%)    O2 Parameters below as of 15 May 2025 01:00  Patient On (Oxygen Delivery Method): nasal cannula, high flow  O2 Flow (L/min): 40  O2 Concentration (%): 40      Adult Advanced Hemodynamics Last 24 Hrs  CVP(mm Hg): --  CVP(cm H2O): --  CO: --  CI: --  PA: --  PA(mean): --  PCWP: --  SVR: --  SVRI: --  PVR: --  PVRI: --, ABG - ( 14 May 2025 21:43 )  pH, Arterial: 7.50  pH, Blood: x     /  pCO2: 44    /  pO2: 89    / HCO3: 34    / Base Excess: 9.9   /  SaO2: 98.7                Intake and output was reviewed and the fluid balance was calculated  Daily     Daily   I&O's Summary    13 May 2025 07:01  -  14 May 2025 07:00  --------------------------------------------------------  IN: 1928.8 mL / OUT: 2915 mL / NET: -986.3 mL    14 May 2025 07:01  -  15 May 2025 01:17  --------------------------------------------------------  IN: 1209.8 mL / OUT: 2780 mL / NET: -1570.2 mL        All lines and drain sites were assessed  Glycemic trend was reviewedCAPILLARY BLOOD GLUCOSE      POCT Blood Glucose.: 173 mg/dL (15 May 2025 01:01)    No acute change in mental status  (+) Orotracheally intubated  Auscultation of the chest reveals equal bs  Abdomen is soft  Extremities are warm and well perfused  Wounds appear clean and unremarkable  Antibiotics are periop    labs  CBC Full  -  ( 14 May 2025 21:43 )  WBC Count : 1.42 K/uL  RBC Count : 3.49 M/uL  Hemoglobin : 10.3 g/dL  Hematocrit : 31.7 %  Platelet Count - Automated : 112 K/uL  Mean Cell Volume : 90.8 fl  Mean Cell Hemoglobin : 29.5 pg  Mean Cell Hemoglobin Concentration : 32.5 g/dL  Auto Neutrophil # : x  Auto Lymphocyte # : x  Auto Monocyte # : x  Auto Eosinophil # : x  Auto Basophil # : x  Auto Neutrophil % : x  Auto Lymphocyte % : x  Auto Monocyte % : x  Auto Eosinophil % : x  Auto Basophil % : x    05-14    147[H]  |  105  |  44[H]  ----------------------------<  199[H]  3.9   |  31  |  1.39[H]    Ca    9.1      14 May 2025 21:43  Phos  3.6     05-14  Mg     2.1     05-14    TPro  5.7[L]  /  Alb  2.5[L]  /  TBili  0.3  /  DBili  x   /  AST  61[H]  /  ALT  73[H]  /  AlkPhos  79  05-14    PT/INR - ( 14 May 2025 21:43 )   PT: 10.3 sec;   INR: 0.88          PTT - ( 14 May 2025 21:43 )  PTT:29.6 sec  The current medications were reviewed   MEDICATIONS  (STANDING):  ampicillin  IVPB      ampicillin  IVPB 2 Gram(s) IV Intermittent every 6 hours  aspirin  chewable 81 milliGRAM(s) Oral daily  buMETAnide Infusion 0.25 mG/Hr (1.25 mL/Hr) IV Continuous <Continuous>  cefTRIAXone   IVPB 2000 milliGRAM(s) IV Intermittent every 12 hours  chlorhexidine 2% Cloths 1 Application(s) Topical <User Schedule>  dextrose 5%. 1000 milliLiter(s) (50 mL/Hr) IV Continuous <Continuous>  dextrose 5%. 1000 milliLiter(s) (100 mL/Hr) IV Continuous <Continuous>  dextrose 50% Injectable 50 milliLiter(s) IV Push every 15 minutes  DOBUTamine Infusion 1 MICROgram(s)/kG/Min (2.24 mL/Hr) IV Continuous <Continuous>  escitalopram 10 milliGRAM(s) Oral at bedtime  glucagon  Injectable 1 milliGRAM(s) IntraMuscular once  heparin   Injectable 5000 Unit(s) SubCutaneous every 8 hours  insulin regular Infusion 5 Unit(s)/Hr (5 mL/Hr) IV Continuous <Continuous>  melatonin 5 milliGRAM(s) Oral at bedtime  pantoprazole  Injectable 40 milliGRAM(s) IV Push daily  polyethylene glycol 3350 17 Gram(s) Oral every 24 hours  senna 2 Tablet(s) Oral at bedtime  sodium chloride 0.9% lock flush 3 milliLiter(s) IV Push every 8 hours    MEDICATIONS  (PRN):  dextrose Oral Gel 15 Gram(s) Oral once PRN Blood Glucose LESS THAN 70 milliGRAM(s)/deciliter       PROBLEM LIST/ ASSESSMENT:  HEALTH ISSUES - PROBLEM Dx:      ,   Patient is a 79y old  Male who presents with a chief complaint of AORTIC VALVE ENDOCARDITI    AORTIC VALVE ENDOCARDITIS     (11 May 2025 18:34)      acute changes include acute respiratory failure    My plan includes :    Titrate pressor support to maintain MAP >70  Titrate inotrope support to maintain CI >2.2/MCVO2 >60  Trend lactate/transaminases  Full Vent support  Titrate Fio2 to maintain Sao2 >95%  Serial ABGs  Abx per ID recs  Diurese to maintain euvolemia or slightly neg fluid balance    close hemodynamic, ventilatory and drain monitoring and management per post op routine    Monitor for arrhythmias and monitor parameters for organ perfusion  monitor neurologic status  Head of the bed should remain elevated to 45 deg .   chest PT and IS will be encouraged  monitor adequacy of oxygenation and ventilation and attempt to wean oxygen  Nutritional goals will be met using po eventually , ensure adequate caloric intake and montior the same  Stress ulcer and VTE prophylaxis will be achieved    Glycemic control is satisfactory  Electrolytes have been repleted as necessary and wound care has been carried out. Pain control has been achieved.   agressive physical therapy and early mobility and ambulation goals will be met   The family was updated about the course and plan  CRITICAL CARE TIME SPENT in evaluation and management, reassessments, review and interpretation of labs and x-rays, ventilator and hemodynamic management, formulating a plan and coordinating care: ___111__ MIN.  Time does not include procedural time.  CTICU ATTENDING     					    Jeanmarie Rubi MD

## 2025-05-10 NOTE — CONSULT NOTE ADULT - SUBJECTIVE AND OBJECTIVE BOX
NEPHROLOGY CONSULTATION NOTE    79M with PMH of HTN, HLD, T2DM, who is now transferred from [facility] for IE, has TTE showing reduce LVEF with thickening of AV leaflets concerning for vegetation. Pending surgical intervention for aortic valve endocarditis.   Pte has bScr ~0.8 and now Non oliguric iATN with sCr 2.0    PAST MEDICAL & SURGICAL HISTORY:  HTN (hypertension)  HLD (hyperlipidemia)  Pneumonia  Aortic valve endocarditis  H/O appendicostomy  History of cholecystectomy    Allergies:  No Known Allergies    Home Medications Reviewed  Hospital Medications:   MEDICATIONS  (STANDING):  ampicillin  IVPB      ampicillin  IVPB 2 Gram(s) IV Intermittent once  ampicillin  IVPB 2 Gram(s) IV Intermittent every 6 hours  aspirin  chewable 81 milliGRAM(s) Oral daily  cefTRIAXone   IVPB 2000 milliGRAM(s) IV Intermittent every 12 hours  chlorhexidine 0.12% Liquid 15 milliLiter(s) Oral Mucosa every 12 hours  dextrose 50% Injectable 50 milliLiter(s) IV Push every 15 minutes  DOBUTamine Infusion 5 MICROgram(s)/kG/Min (11.2 mL/Hr) IV Continuous <Continuous>  furosemide Infusion 5 mG/Hr (2.5 mL/Hr) IV Continuous <Continuous>  heparin   Injectable 5000 Unit(s) SubCutaneous every 8 hours  insulin regular Infusion 5 Unit(s)/Hr (5 mL/Hr) IV Continuous <Continuous>  norepinephrine Infusion 0.05 MICROgram(s)/kG/Min (7 mL/Hr) IV Continuous <Continuous>  phenylephrine    Infusion 0.5 MICROgram(s)/kG/Min (14 mL/Hr) IV Continuous <Continuous>  polyethylene glycol 3350 17 Gram(s) Oral every 24 hours  propofol Infusion 30 MICROgram(s)/kG/Min (13.4 mL/Hr) IV Continuous <Continuous>  senna 2 Tablet(s) Oral at bedtime  sodium bicarbonate  Injectable 50 milliEquivalent(s) IV Push every 5 minutes  sodium chloride 0.9% lock flush 3 milliLiter(s) IV Push every 8 hours  vasopressin Infusion 0.08 Unit(s)/Min (12 mL/Hr) IV Continuous <Continuous>    SOCIAL HISTORY:  Denies ETOH,Smoking,   FAMILY HISTORY:    REVIEW OF SYSTEMS:  All other review of systems is negative unless indicated above.    VITALS:  Vital Signs Last 24 Hrs  T(C): 35.9 (10 May 2025 08:59), Max: 36.3 (09 May 2025 17:25)  T(F): 96.7 (10 May 2025 08:59), Max: 97.4 (09 May 2025 17:25)  HR: 89 (10 May 2025 12:10) (70 - 102)  BP: 158/51 (09 May 2025 17:17) (158/51 - 158/51)  BP(mean): 81 (09 May 2025 17:17) (81 - 81)  RR: 18 (10 May 2025 12:10) (12 - 20)  SpO2: 98% (10 May 2025 12:10) (93% - 100%)    Parameters below as of 10 May 2025 12:10  Patient On (Oxygen Delivery Method): ventilator    O2 Concentration (%): 50    05-09 @ 07:01  -  05-10 @ 07:00  --------------------------------------------------------  IN: 1810.8 mL / OUT: 635 mL / NET: 1175.8 mL    05-10 @ 07:01  -  05-10 @ 12:49  --------------------------------------------------------  IN: 398 mL / OUT: 750 mL / NET: -352 mL      Height (cm): 175.3 (05-09 @ 18:01)  Weight (kg): 74.7 (05-09 @ 17:46)  BMI (kg/m2): 24.3 (05-09 @ 18:01)  BSA (m2): 1.9 (05-09 @ 18:01)    PHYSICAL EXAM:  Constitutional: NAD  Neck: No JVD  Respiratory: CTAB, no wheezes, rales or rhonchi  Cardiovascular: S1, S2, RRR, noted murmur in Ao foci.   Gastrointestinal: ND/NT, +BS  Extremities:  No peripheral edema in LEs.   Neurological: A/O x 3, no focal motor deficits.   : No cabrera.       LABS:  05-10    137  |  99  |  53[H]  ----------------------------<  95  3.9   |  25  |  2.05[H]    Ca    8.2[L]      10 May 2025 09:19  Phos  5.3     05-10  Mg     2.5     05-10    TPro  5.3[L]  /  Alb  2.3[L]  /  TBili  0.5  /  DBili      /  AST  428[H]  /  ALT  304[H]  /  AlkPhos  106  05-10    Creatinine Trend: 2.05 <--, 2.06 <--, 1.92 <--                        9.9    7.30  )-----------( 176      ( 10 May 2025 09:19 )             29.5     Urine Studies:  Urinalysis Basic - ( 10 May 2025 09:19 )    Color:  / Appearance:  / SG:  / pH:   Gluc: 95 mg/dL / Ketone:   / Bili:  / Urobili:    Blood:  / Protein:  / Nitrite:    Leuk Esterase:  / RBC:  / WBC    Sq Epi:  / Non Sq Epi:  / Bacteria:                 RADIOLOGY & ADDITIONAL STUDIES: Reviewed.

## 2025-05-10 NOTE — PROGRESS NOTE ADULT - SUBJECTIVE AND OBJECTIVE BOX
CTICU  CRITICAL  CARE  attending     Hand off received 					   Pertinent clinical, laboratory, radiographic, hemodynamic, echocardiographic, respiratory data, microbiologic data and chart were reviewed and analyzed frequently throughout the course of the day  Patient seen and examined with CTS/ SH attending at bedside  Pt is a 79yr old male with PMH HTN, HLD, DM (A1C 8.4), presented to [facility] with sob and weakness x 2 weeks. Workup revealed multifocal PNA, acute respiratory failure requiring BIPAP and ICU admission, bacteremia with gram positive cocci and TTE with concern for vegetation on aortic valve leaflets prompting tx to [facility]  for surgical evaluation. Pt arrived on BIPAP and levo 0.1. Intubated, bronched, arterial and TLC line placed.  added overnight. Insulin gtt started. Lasix gtt started and increased to 10. : Selma placed. Tube feeds started. Seen by ID and abx changed to ampicillin and ceftriaxone.       FAMILY HISTORY:  PAST MEDICAL & SURGICAL HISTORY:  HTN (hypertension)  HLD (hyperlipidemia)  Pneumonia  Aortic valve endocarditis  H/O appendicostomy  History of cholecystectomy        Patient is a 79y old  Male who presents with a chief complaint of     14 system review was unremarkable    Vital signs, hemodynamic and respiratory parameters were reviewed from the bedside nursing flowsheet.  ICU Vital Signs Last 24 Hrs  T(C): 36.4 (10 May 2025 14:14), Max: 36.4 (10 May 2025 14:14)  T(F): 97.5 (10 May 2025 14:14), Max: 97.5 (10 May 2025 14:14)  HR: 97 (10 May 2025 20:00) (74 - 97)  BP: --  BP(mean): --  ABP: 142/42 (10 May 2025 20:00) (94/42 - 147/42)  ABP(mean): 66 (10 May 2025 20:00) (61 - 74)  RR: 12 (10 May 2025 19:00) (12 - 20)  SpO2: 100% (10 May 2025 20:00) (97% - 100%)    O2 Parameters below as of 10 May 2025 20:00  Patient On (Oxygen Delivery Method): ventilator    O2 Concentration (%): 50      Adult Advanced Hemodynamics Last 24 Hrs  CVP(mm Hg): 13 (10 May 2025 20:00) (5 - 16)  CVP(cm H2O): --  CO: 2.7 (10 May 2025 17:21) (2.7 - 2.7)  CI: 1.4 (10 May 2025 17:21) (1.4 - 1.4)  PA: 45/23 (10 May 2025 20:00) (45/23 - 52/27)  PA(mean): 33 (10 May 2025 20:00) (33 - 38)  PCWP: --  SVR: 1509 (10 May 2025 17:21) (1509 - 1509)  SVRI: 2910 (10 May 2025 17:21) (2910 - 2910)  PVR: --  PVRI: --, ABG - ( 10 May 2025 18:07 )  pH, Arterial: 7.41  pH, Blood: x     /  pCO2: 29    /  pO2: 164   / HCO3: 18    / Base Excess: -5.0  /  SaO2: 99.8              Mode: AC/ CMV (Assist Control/ Continuous Mandatory Ventilation)  RR (machine): 12  TV (machine): 550  FiO2: 50  PEEP: 8  ITime: 1  MAP: 13  PIP: 24    Intake and output was reviewed and the fluid balance was calculated  Daily     Daily   I&O's Summary    09 May 2025 07:01  -  10 May 2025 07:00  --------------------------------------------------------  IN: 1810.8 mL / OUT: 635 mL / NET: 1175.8 mL    10 May 2025 07:01  -  10 May 2025 20:47  --------------------------------------------------------  IN: 1362 mL / OUT: 1815 mL / NET: -453 mL        All lines and drain sites were assessed  Glycemic trend was reviewedCAPILLARY BLOOD GLUCOSE      POCT Blood Glucose.: 124 mg/dL (10 May 2025 19:51)    Neuro: sedated, pale elderly male  HEENT: ett  Heart: s1 s2  Lungs: decreased bl  Abdomen: soft nt nd  Extremities: cool extremities    Lines:  RIJ TLC   R radial arterial line   LIJ Cordis with Selma 5/10      labs  CBC Full  -  ( 10 May 2025 15:57 )  WBC Count : 6.94 K/uL  RBC Count : 3.42 M/uL  Hemoglobin : 9.9 g/dL  Hematocrit : 29.7 %  Platelet Count - Automated : 163 K/uL  Mean Cell Volume : 86.8 fl  Mean Cell Hemoglobin : 28.9 pg  Mean Cell Hemoglobin Concentration : 33.3 g/dL  Auto Neutrophil # : x  Auto Lymphocyte # : x  Auto Monocyte # : x  Auto Eosinophil # : x  Auto Basophil # : x  Auto Neutrophil % : x  Auto Lymphocyte % : x  Auto Monocyte % : x  Auto Eosinophil % : x  Auto Basophil % : x    05-10    138  |  101  |  60[H]  ----------------------------<  113[H]  3.6   |  22  |  2.07[H]    Ca    8.4      10 May 2025 15:57  Phos  5.0     05-10  Mg     2.2     -10    TPro  5.7[L]  /  Alb  2.5[L]  /  TBili  0.6  /  DBili  x   /  AST  415[H]  /  ALT  297[H]  /  AlkPhos  108  05-10    PT/INR - ( 10 May 2025 15:57 )   PT: 12.2 sec;   INR: 1.04          PTT - ( 10 May 2025 15:57 )  PTT:28.0 sec  The current medications were reviewed   MEDICATIONS  (STANDING):  ampicillin  IVPB      ampicillin  IVPB 2 Gram(s) IV Intermittent every 6 hours  aspirin  chewable 81 milliGRAM(s) Oral daily  cefTRIAXone   IVPB 2000 milliGRAM(s) IV Intermittent every 12 hours  chlorhexidine 0.12% Liquid 15 milliLiter(s) Oral Mucosa every 12 hours  dextrose 50% Injectable 50 milliLiter(s) IV Push every 15 minutes  DOBUTamine Infusion 5 MICROgram(s)/kG/Min (11.2 mL/Hr) IV Continuous <Continuous>  furosemide Infusion 5 mG/Hr (2.5 mL/Hr) IV Continuous <Continuous>  heparin   Injectable 5000 Unit(s) SubCutaneous every 8 hours  insulin regular Infusion 5 Unit(s)/Hr (5 mL/Hr) IV Continuous <Continuous>  norepinephrine Infusion 0.05 MICROgram(s)/kG/Min (3.5 mL/Hr) IV Continuous <Continuous>  phenylephrine    Infusion 0.5 MICROgram(s)/kG/Min (7 mL/Hr) IV Continuous <Continuous>  polyethylene glycol 3350 17 Gram(s) Oral every 24 hours  propofol Infusion 30 MICROgram(s)/kG/Min (13.4 mL/Hr) IV Continuous <Continuous>  senna 2 Tablet(s) Oral at bedtime  sodium bicarbonate  Injectable 50 milliEquivalent(s) IV Push every 5 minutes  sodium chloride 0.9% lock flush 3 milliLiter(s) IV Push every 8 hours  vasopressin Infusion 0.08 Unit(s)/Min (12 mL/Hr) IV Continuous <Continuous>    MEDICATIONS  (PRN):      Assessment/Plan:  79M with PMHx of HTN, HLD, T2DM, acute cholecystectomy c/b pleural effusions requiring drainage who presented to [facility] with sob and weakness x 2 weeks. In ED, he was found to have elevated troponins, Cr 1.9 (baseline 0.75) [place] [address] and transaminitisHe was hypoxic in ED with CT chest showing multifocal PNA and acute respiratory failure. He was placed on HFNC and transferred to ICU. Blood cultures were + for gram positive cocci. TTE showed reduce LVEF with thickening of AV leaflets concerning for vegetation. Steroids were started.  He was placed on Vanc/Zosyn/Azithromycin and is now transferred to [facility] for mgmt.       AV Endocarditis  Gram positive cocci-E faecalis from [place] blood x  Follow blood cultures  Continue ampicillin and ceftriaxone per ID  Acute respiratory failure requiring mechanical ventilation-keep intubated  Vasogenic shock on levo, vaso, maria fernanda-MAP goal >65  ID consult  Preop workup  LANETTE  Transaminitis  Continue dobutamine  Serial CI/CO  Trend end organ perfusion markers  Renal consult  Elevated BNP  Diuresis-lasix gtt  DM  Insulin per protocol  Tube feeds  Replete lytes prn  GI/DVT PPX  Bowel Regimen  Pain control  Close hemodynamic, ventilatory and drain monitoring and management per post op routine  Monitor for arrhythmias and monitor parameters for organ perfusion  Beta blockade not administered due to hemodynamic instability and bradycardia  Monitor neurologic status  Head of the bed should remain elevated to 45 deg   Chest PT and IS will be encouraged  Monitor adequacy of oxygenation and ventilation and attempt to wean oxygen  Antibiotic regimen will be tailored to the clinical, laboratory and microbiologic data  Nutritional goals will be met using po eventually, ensure adequate caloric intake and monitor the same  Stress ulcer and VTE prophylaxis will be achieved    Glycemic control is satisfactory  Electrolytes have been repleted as necessary and wound care has been carried out   Pain control has been achieved.   Aggressive physical therapy and early mobility and ambulation goals will be met   The family was updated about the course and plan.    CRITICAL CARE TIME personally provided by me  in evaluation and management, reassessments, review and interpretation of labs and x-rays, ventilator and hemodynamic management, formulating a plan and coordinating care: ___60___ MIN.  Time does not include procedural time.       CTICU ATTENDING     					  Jennifer Persaud MD

## 2025-05-11 LAB
ALBUMIN SERPL ELPH-MCNC: 2.2 G/DL — LOW (ref 3.3–5)
ALBUMIN SERPL ELPH-MCNC: 2.3 G/DL — LOW (ref 3.3–5)
ALBUMIN SERPL ELPH-MCNC: 2.3 G/DL — LOW (ref 3.3–5)
ALBUMIN SERPL ELPH-MCNC: 2.5 G/DL — LOW (ref 3.3–5)
ALP SERPL-CCNC: 102 U/L — SIGNIFICANT CHANGE UP (ref 40–120)
ALP SERPL-CCNC: 105 U/L — SIGNIFICANT CHANGE UP (ref 40–120)
ALP SERPL-CCNC: 110 U/L — SIGNIFICANT CHANGE UP (ref 40–120)
ALP SERPL-CCNC: 86 U/L — SIGNIFICANT CHANGE UP (ref 40–120)
ALT FLD-CCNC: 189 U/L — HIGH (ref 10–45)
ALT FLD-CCNC: 231 U/L — HIGH (ref 10–45)
ALT FLD-CCNC: 248 U/L — HIGH (ref 10–45)
ALT FLD-CCNC: 261 U/L — HIGH (ref 10–45)
ANION GAP SERPL CALC-SCNC: 12 MMOL/L — SIGNIFICANT CHANGE UP (ref 5–17)
ANION GAP SERPL CALC-SCNC: 13 MMOL/L — SIGNIFICANT CHANGE UP (ref 5–17)
ANION GAP SERPL CALC-SCNC: 15 MMOL/L — SIGNIFICANT CHANGE UP (ref 5–17)
ANION GAP SERPL CALC-SCNC: 15 MMOL/L — SIGNIFICANT CHANGE UP (ref 5–17)
APTT BLD: 28.3 SEC — SIGNIFICANT CHANGE UP (ref 26.1–36.8)
APTT BLD: 28.3 SEC — SIGNIFICANT CHANGE UP (ref 26.1–36.8)
APTT BLD: 29.1 SEC — SIGNIFICANT CHANGE UP (ref 26.1–36.8)
APTT BLD: 30.4 SEC — SIGNIFICANT CHANGE UP (ref 26.1–36.8)
AST SERPL-CCNC: 142 U/L — HIGH (ref 10–40)
AST SERPL-CCNC: 199 U/L — HIGH (ref 10–40)
AST SERPL-CCNC: 222 U/L — HIGH (ref 10–40)
AST SERPL-CCNC: 276 U/L — HIGH (ref 10–40)
BASE EXCESS BLDV CALC-SCNC: -0.5 MMOL/L — SIGNIFICANT CHANGE UP (ref -2–3)
BASE EXCESS BLDV CALC-SCNC: -1.9 MMOL/L — SIGNIFICANT CHANGE UP (ref -2–3)
BASE EXCESS BLDV CALC-SCNC: 0.1 MMOL/L — SIGNIFICANT CHANGE UP (ref -2–3)
BASE EXCESS BLDV CALC-SCNC: 1.7 MMOL/L — SIGNIFICANT CHANGE UP (ref -2–3)
BILIRUB SERPL-MCNC: 0.3 MG/DL — SIGNIFICANT CHANGE UP (ref 0.2–1.2)
BILIRUB SERPL-MCNC: 0.3 MG/DL — SIGNIFICANT CHANGE UP (ref 0.2–1.2)
BILIRUB SERPL-MCNC: 0.4 MG/DL — SIGNIFICANT CHANGE UP (ref 0.2–1.2)
BILIRUB SERPL-MCNC: 0.4 MG/DL — SIGNIFICANT CHANGE UP (ref 0.2–1.2)
BUN SERPL-MCNC: 42 MG/DL — HIGH (ref 7–23)
BUN SERPL-MCNC: 45 MG/DL — HIGH (ref 7–23)
BUN SERPL-MCNC: 45 MG/DL — HIGH (ref 7–23)
BUN SERPL-MCNC: 51 MG/DL — HIGH (ref 7–23)
CALCIUM SERPL-MCNC: 8.1 MG/DL — LOW (ref 8.4–10.5)
CALCIUM SERPL-MCNC: 8.2 MG/DL — LOW (ref 8.4–10.5)
CALCIUM SERPL-MCNC: 8.3 MG/DL — LOW (ref 8.4–10.5)
CALCIUM SERPL-MCNC: 8.3 MG/DL — LOW (ref 8.4–10.5)
CHLORIDE SERPL-SCNC: 100 MMOL/L — SIGNIFICANT CHANGE UP (ref 96–108)
CHLORIDE SERPL-SCNC: 102 MMOL/L — SIGNIFICANT CHANGE UP (ref 96–108)
CHLORIDE SERPL-SCNC: 104 MMOL/L — SIGNIFICANT CHANGE UP (ref 96–108)
CHLORIDE SERPL-SCNC: 105 MMOL/L — SIGNIFICANT CHANGE UP (ref 96–108)
CO2 BLDV-SCNC: 23 MMOL/L — SIGNIFICANT CHANGE UP (ref 22–26)
CO2 BLDV-SCNC: 25 MMOL/L — SIGNIFICANT CHANGE UP (ref 22–26)
CO2 BLDV-SCNC: 26 MMOL/L — SIGNIFICANT CHANGE UP (ref 22–26)
CO2 BLDV-SCNC: 27 MMOL/L — HIGH (ref 22–26)
CO2 SERPL-SCNC: 22 MMOL/L — SIGNIFICANT CHANGE UP (ref 22–31)
CO2 SERPL-SCNC: 22 MMOL/L — SIGNIFICANT CHANGE UP (ref 22–31)
CO2 SERPL-SCNC: 23 MMOL/L — SIGNIFICANT CHANGE UP (ref 22–31)
CO2 SERPL-SCNC: 24 MMOL/L — SIGNIFICANT CHANGE UP (ref 22–31)
CREAT SERPL-MCNC: 1.76 MG/DL — HIGH (ref 0.5–1.3)
CREAT SERPL-MCNC: 1.83 MG/DL — HIGH (ref 0.5–1.3)
CREAT SERPL-MCNC: 1.92 MG/DL — HIGH (ref 0.5–1.3)
CREAT SERPL-MCNC: 1.94 MG/DL — HIGH (ref 0.5–1.3)
CULTURE RESULTS: ABNORMAL
CULTURE RESULTS: SIGNIFICANT CHANGE UP
EGFR: 35 ML/MIN/1.73M2 — LOW
EGFR: 37 ML/MIN/1.73M2 — LOW
EGFR: 37 ML/MIN/1.73M2 — LOW
EGFR: 39 ML/MIN/1.73M2 — LOW
EGFR: 39 ML/MIN/1.73M2 — LOW
GAS PNL BLDA: SIGNIFICANT CHANGE UP
GAS PNL BLDV: SIGNIFICANT CHANGE UP
GLUCOSE SERPL-MCNC: 143 MG/DL — HIGH (ref 70–99)
GLUCOSE SERPL-MCNC: 148 MG/DL — HIGH (ref 70–99)
GLUCOSE SERPL-MCNC: 166 MG/DL — HIGH (ref 70–99)
GLUCOSE SERPL-MCNC: 195 MG/DL — HIGH (ref 70–99)
GRAM STN FLD: ABNORMAL
HCO3 BLDV-SCNC: 22 MMOL/L — SIGNIFICANT CHANGE UP (ref 22–29)
HCO3 BLDV-SCNC: 24 MMOL/L — SIGNIFICANT CHANGE UP (ref 22–29)
HCO3 BLDV-SCNC: 25 MMOL/L — SIGNIFICANT CHANGE UP (ref 22–29)
HCO3 BLDV-SCNC: 26 MMOL/L — SIGNIFICANT CHANGE UP (ref 22–29)
HCT VFR BLD CALC: 25.3 % — LOW (ref 39–50)
HCT VFR BLD CALC: 28.7 % — LOW (ref 39–50)
HCT VFR BLD CALC: 28.7 % — LOW (ref 39–50)
HCT VFR BLD CALC: 29.1 % — LOW (ref 39–50)
HGB BLD-MCNC: 8.3 G/DL — LOW (ref 13–17)
HGB BLD-MCNC: 9.5 G/DL — LOW (ref 13–17)
HGB BLD-MCNC: 9.6 G/DL — LOW (ref 13–17)
HGB BLD-MCNC: 9.8 G/DL — LOW (ref 13–17)
INR BLD: 1.07 — SIGNIFICANT CHANGE UP (ref 0.85–1.16)
INR BLD: 1.08 — SIGNIFICANT CHANGE UP (ref 0.85–1.16)
INR BLD: 1.1 — SIGNIFICANT CHANGE UP (ref 0.85–1.16)
LACTATE SERPL-SCNC: 1.4 MMOL/L — SIGNIFICANT CHANGE UP (ref 0.5–2)
LACTATE SERPL-SCNC: 1.7 MMOL/L — SIGNIFICANT CHANGE UP (ref 0.5–2)
MAGNESIUM SERPL-MCNC: 2 MG/DL — SIGNIFICANT CHANGE UP (ref 1.6–2.6)
MAGNESIUM SERPL-MCNC: 2.2 MG/DL — SIGNIFICANT CHANGE UP (ref 1.6–2.6)
MAGNESIUM SERPL-MCNC: 2.3 MG/DL — SIGNIFICANT CHANGE UP (ref 1.6–2.6)
MAGNESIUM SERPL-MCNC: 2.4 MG/DL — SIGNIFICANT CHANGE UP (ref 1.6–2.6)
MCHC RBC-ENTMCNC: 28.6 PG — SIGNIFICANT CHANGE UP (ref 27–34)
MCHC RBC-ENTMCNC: 29 PG — SIGNIFICANT CHANGE UP (ref 27–34)
MCHC RBC-ENTMCNC: 29.1 PG — SIGNIFICANT CHANGE UP (ref 27–34)
MCHC RBC-ENTMCNC: 29.3 PG — SIGNIFICANT CHANGE UP (ref 27–34)
MCHC RBC-ENTMCNC: 32.8 G/DL — SIGNIFICANT CHANGE UP (ref 32–36)
MCHC RBC-ENTMCNC: 33.1 G/DL — SIGNIFICANT CHANGE UP (ref 32–36)
MCHC RBC-ENTMCNC: 33.4 G/DL — SIGNIFICANT CHANGE UP (ref 32–36)
MCHC RBC-ENTMCNC: 33.7 G/DL — SIGNIFICANT CHANGE UP (ref 32–36)
MCV RBC AUTO: 86.4 FL — SIGNIFICANT CHANGE UP (ref 80–100)
MCV RBC AUTO: 87.2 FL — SIGNIFICANT CHANGE UP (ref 80–100)
MCV RBC AUTO: 87.5 FL — SIGNIFICANT CHANGE UP (ref 80–100)
MCV RBC AUTO: 87.5 FL — SIGNIFICANT CHANGE UP (ref 80–100)
NRBC BLD AUTO-RTO: 0 /100 WBCS — SIGNIFICANT CHANGE UP (ref 0–0)
PCO2 BLDV: 35 MMHG — LOW (ref 42–55)
PCO2 BLDV: 38 MMHG — LOW (ref 42–55)
PCO2 BLDV: 39 MMHG — LOW (ref 42–55)
PCO2 BLDV: 39 MMHG — LOW (ref 42–55)
PH BLDV: 7.4 — SIGNIFICANT CHANGE UP (ref 7.32–7.43)
PH BLDV: 7.41 — SIGNIFICANT CHANGE UP (ref 7.32–7.43)
PH BLDV: 7.41 — SIGNIFICANT CHANGE UP (ref 7.32–7.43)
PH BLDV: 7.44 — HIGH (ref 7.32–7.43)
PHOSPHATE SERPL-MCNC: 2.8 MG/DL — SIGNIFICANT CHANGE UP (ref 2.5–4.5)
PHOSPHATE SERPL-MCNC: 3.3 MG/DL — SIGNIFICANT CHANGE UP (ref 2.5–4.5)
PLATELET # BLD AUTO: 109 K/UL — LOW (ref 150–400)
PLATELET # BLD AUTO: 149 K/UL — LOW (ref 150–400)
PLATELET # BLD AUTO: 158 K/UL — SIGNIFICANT CHANGE UP (ref 150–400)
PLATELET # BLD AUTO: 164 K/UL — SIGNIFICANT CHANGE UP (ref 150–400)
PO2 BLDV: 35 MMHG — SIGNIFICANT CHANGE UP (ref 25–45)
PO2 BLDV: <33 MMHG — SIGNIFICANT CHANGE UP (ref 25–45)
POTASSIUM SERPL-MCNC: 3.4 MMOL/L — LOW (ref 3.5–5.3)
POTASSIUM SERPL-MCNC: 3.8 MMOL/L — SIGNIFICANT CHANGE UP (ref 3.5–5.3)
POTASSIUM SERPL-MCNC: 3.9 MMOL/L — SIGNIFICANT CHANGE UP (ref 3.5–5.3)
POTASSIUM SERPL-MCNC: 4.4 MMOL/L — SIGNIFICANT CHANGE UP (ref 3.5–5.3)
POTASSIUM SERPL-SCNC: 3.4 MMOL/L — LOW (ref 3.5–5.3)
POTASSIUM SERPL-SCNC: 3.8 MMOL/L — SIGNIFICANT CHANGE UP (ref 3.5–5.3)
POTASSIUM SERPL-SCNC: 3.9 MMOL/L — SIGNIFICANT CHANGE UP (ref 3.5–5.3)
POTASSIUM SERPL-SCNC: 4.4 MMOL/L — SIGNIFICANT CHANGE UP (ref 3.5–5.3)
PROT SERPL-MCNC: 5.1 G/DL — LOW (ref 6–8.3)
PROT SERPL-MCNC: 5.4 G/DL — LOW (ref 6–8.3)
PROT SERPL-MCNC: 5.7 G/DL — LOW (ref 6–8.3)
PROT SERPL-MCNC: 5.8 G/DL — LOW (ref 6–8.3)
PROTHROM AB SERPL-ACNC: 12.3 SEC — SIGNIFICANT CHANGE UP (ref 9.9–13.4)
PROTHROM AB SERPL-ACNC: 12.4 SEC — SIGNIFICANT CHANGE UP (ref 9.9–13.4)
PROTHROM AB SERPL-ACNC: 12.6 SEC — SIGNIFICANT CHANGE UP (ref 9.9–13.4)
RBC # BLD: 2.9 M/UL — LOW (ref 4.2–5.8)
RBC # BLD: 3.28 M/UL — LOW (ref 4.2–5.8)
RBC # BLD: 3.28 M/UL — LOW (ref 4.2–5.8)
RBC # BLD: 3.37 M/UL — LOW (ref 4.2–5.8)
RBC # FLD: 17.8 % — HIGH (ref 10.3–14.5)
RBC # FLD: 18 % — HIGH (ref 10.3–14.5)
RBC # FLD: 18.1 % — HIGH (ref 10.3–14.5)
RBC # FLD: 18.1 % — HIGH (ref 10.3–14.5)
SAO2 % BLDV: 48.4 % — LOW (ref 67–88)
SAO2 % BLDV: 54.4 % — LOW (ref 67–88)
SAO2 % BLDV: 56.7 % — LOW (ref 67–88)
SAO2 % BLDV: 63.4 % — LOW (ref 67–88)
SODIUM SERPL-SCNC: 137 MMOL/L — SIGNIFICANT CHANGE UP (ref 135–145)
SODIUM SERPL-SCNC: 139 MMOL/L — SIGNIFICANT CHANGE UP (ref 135–145)
SODIUM SERPL-SCNC: 140 MMOL/L — SIGNIFICANT CHANGE UP (ref 135–145)
SODIUM SERPL-SCNC: 141 MMOL/L — SIGNIFICANT CHANGE UP (ref 135–145)
SPECIMEN SOURCE: SIGNIFICANT CHANGE UP
SPECIMEN SOURCE: SIGNIFICANT CHANGE UP
WBC # BLD: 4.52 K/UL — SIGNIFICANT CHANGE UP (ref 3.8–10.5)
WBC # BLD: 6.89 K/UL — SIGNIFICANT CHANGE UP (ref 3.8–10.5)
WBC # BLD: 7.41 K/UL — SIGNIFICANT CHANGE UP (ref 3.8–10.5)
WBC # BLD: 7.7 K/UL — SIGNIFICANT CHANGE UP (ref 3.8–10.5)
WBC # FLD AUTO: 4.52 K/UL — SIGNIFICANT CHANGE UP (ref 3.8–10.5)
WBC # FLD AUTO: 6.89 K/UL — SIGNIFICANT CHANGE UP (ref 3.8–10.5)
WBC # FLD AUTO: 7.41 K/UL — SIGNIFICANT CHANGE UP (ref 3.8–10.5)
WBC # FLD AUTO: 7.7 K/UL — SIGNIFICANT CHANGE UP (ref 3.8–10.5)

## 2025-05-11 PROCEDURE — 99223 1ST HOSP IP/OBS HIGH 75: CPT

## 2025-05-11 PROCEDURE — 99232 SBSQ HOSP IP/OBS MODERATE 35: CPT

## 2025-05-11 PROCEDURE — G0545: CPT

## 2025-05-11 PROCEDURE — 99291 CRITICAL CARE FIRST HOUR: CPT

## 2025-05-11 PROCEDURE — 71045 X-RAY EXAM CHEST 1 VIEW: CPT | Mod: 26

## 2025-05-11 RX ORDER — CALCIUM GLUCONATE 20 MG/ML
2 INJECTION, SOLUTION INTRAVENOUS ONCE
Refills: 0 | Status: COMPLETED | OUTPATIENT
Start: 2025-05-11 | End: 2025-05-11

## 2025-05-11 RX ORDER — MAGNESIUM SULFATE 500 MG/ML
2 SYRINGE (ML) INJECTION ONCE
Refills: 0 | Status: COMPLETED | OUTPATIENT
Start: 2025-05-11 | End: 2025-05-11

## 2025-05-11 RX ORDER — SODIUM CHLORIDE 9 G/1000ML
1000 INJECTION, SOLUTION INTRAVENOUS
Refills: 0 | Status: DISCONTINUED | OUTPATIENT
Start: 2025-05-11 | End: 2025-05-16

## 2025-05-11 RX ORDER — DEXTROSE 50 % IN WATER 50 %
15 SYRINGE (ML) INTRAVENOUS ONCE
Refills: 0 | Status: DISCONTINUED | OUTPATIENT
Start: 2025-05-11 | End: 2025-05-16

## 2025-05-11 RX ORDER — GLUCAGON 3 MG/1
1 POWDER NASAL ONCE
Refills: 0 | Status: DISCONTINUED | OUTPATIENT
Start: 2025-05-11 | End: 2025-05-16

## 2025-05-11 RX ORDER — ALBUMIN (HUMAN) 12.5 G/50ML
250 INJECTION, SOLUTION INTRAVENOUS ONCE
Refills: 0 | Status: COMPLETED | OUTPATIENT
Start: 2025-05-11 | End: 2025-05-11

## 2025-05-11 RX ORDER — INSULIN LISPRO 100 U/ML
INJECTION, SOLUTION INTRAVENOUS; SUBCUTANEOUS
Refills: 0 | Status: DISCONTINUED | OUTPATIENT
Start: 2025-05-11 | End: 2025-05-11

## 2025-05-11 RX ADMIN — Medication 5 UNIT(S)/HR: at 22:18

## 2025-05-11 RX ADMIN — VASOPRESSIN 12 UNIT(S)/MIN: 20 INJECTION INTRAVENOUS at 17:15

## 2025-05-11 RX ADMIN — PROPOFOL 13.4 MICROGRAM(S)/KG/MIN: 10 INJECTION, EMULSION INTRAVENOUS at 12:42

## 2025-05-11 RX ADMIN — VASOPRESSIN 12 UNIT(S)/MIN: 20 INJECTION INTRAVENOUS at 22:00

## 2025-05-11 RX ADMIN — ALBUMIN (HUMAN) 125 MILLILITER(S): 12.5 INJECTION, SOLUTION INTRAVENOUS at 14:37

## 2025-05-11 RX ADMIN — Medication 81 MILLIGRAM(S): at 11:12

## 2025-05-11 RX ADMIN — Medication 3 MILLILITER(S): at 06:18

## 2025-05-11 RX ADMIN — Medication 7 MICROGRAM(S)/KG/MIN: at 22:18

## 2025-05-11 RX ADMIN — PROPOFOL 13.4 MICROGRAM(S)/KG/MIN: 10 INJECTION, EMULSION INTRAVENOUS at 22:17

## 2025-05-11 RX ADMIN — DEXMEDETOMIDINE HYDROCHLORIDE IN SODIUM CHLORIDE 5.6 MICROGRAM(S)/KG/HR: 4 INJECTION INTRAVENOUS at 12:42

## 2025-05-11 RX ADMIN — FUROSEMIDE 2.5 MG/HR: 10 INJECTION INTRAMUSCULAR; INTRAVENOUS at 19:20

## 2025-05-11 RX ADMIN — Medication 25 GRAM(S): at 01:00

## 2025-05-11 RX ADMIN — Medication 100 MILLIEQUIVALENT(S): at 15:52

## 2025-05-11 RX ADMIN — Medication 15 MILLILITER(S): at 06:18

## 2025-05-11 RX ADMIN — Medication 3 MILLILITER(S): at 21:52

## 2025-05-11 RX ADMIN — CALCIUM GLUCONATE 200 GRAM(S): 20 INJECTION, SOLUTION INTRAVENOUS at 14:38

## 2025-05-11 RX ADMIN — Medication 3 MILLILITER(S): at 13:40

## 2025-05-11 RX ADMIN — DOBUTAMINE 11.2 MICROGRAM(S)/KG/MIN: 250 INJECTION INTRAVENOUS at 22:18

## 2025-05-11 RX ADMIN — Medication 5 UNIT(S)/HR: at 19:20

## 2025-05-11 RX ADMIN — Medication 25 MICROGRAM(S): at 08:31

## 2025-05-11 RX ADMIN — HEPARIN SODIUM 5000 UNIT(S): 1000 INJECTION INTRAVENOUS; SUBCUTANEOUS at 06:23

## 2025-05-11 RX ADMIN — NOREPINEPHRINE BITARTRATE 3.5 MICROGRAM(S)/KG/MIN: 8 SOLUTION at 22:18

## 2025-05-11 RX ADMIN — Medication 7 MICROGRAM(S)/KG/MIN: at 04:30

## 2025-05-11 RX ADMIN — AMPICILLIN SODIUM 216 GRAM(S): 1 INJECTION, POWDER, FOR SOLUTION INTRAMUSCULAR; INTRAVENOUS at 11:12

## 2025-05-11 RX ADMIN — VASOPRESSIN 12 UNIT(S)/MIN: 20 INJECTION INTRAVENOUS at 03:31

## 2025-05-11 RX ADMIN — Medication 100 MILLIEQUIVALENT(S): at 20:19

## 2025-05-11 RX ADMIN — CEFTRIAXONE 100 MILLIGRAM(S): 500 INJECTION, POWDER, FOR SOLUTION INTRAMUSCULAR; INTRAVENOUS at 17:15

## 2025-05-11 RX ADMIN — AMPICILLIN SODIUM 216 GRAM(S): 1 INJECTION, POWDER, FOR SOLUTION INTRAMUSCULAR; INTRAVENOUS at 06:23

## 2025-05-11 RX ADMIN — Medication 25 MICROGRAM(S): at 09:34

## 2025-05-11 RX ADMIN — Medication 100 MILLIEQUIVALENT(S): at 06:23

## 2025-05-11 RX ADMIN — Medication 100 MILLIEQUIVALENT(S): at 07:11

## 2025-05-11 RX ADMIN — FUROSEMIDE 2.5 MG/HR: 10 INJECTION INTRAMUSCULAR; INTRAVENOUS at 22:00

## 2025-05-11 RX ADMIN — DOBUTAMINE 11.2 MICROGRAM(S)/KG/MIN: 250 INJECTION INTRAVENOUS at 12:42

## 2025-05-11 RX ADMIN — Medication 15 MILLILITER(S): at 17:16

## 2025-05-11 RX ADMIN — NOREPINEPHRINE BITARTRATE 3.5 MICROGRAM(S)/KG/MIN: 8 SOLUTION at 04:32

## 2025-05-11 RX ADMIN — HEPARIN SODIUM 5000 UNIT(S): 1000 INJECTION INTRAVENOUS; SUBCUTANEOUS at 13:57

## 2025-05-11 RX ADMIN — ALBUMIN (HUMAN) 125 MILLILITER(S): 12.5 INJECTION, SOLUTION INTRAVENOUS at 23:44

## 2025-05-11 RX ADMIN — HEPARIN SODIUM 5000 UNIT(S): 1000 INJECTION INTRAVENOUS; SUBCUTANEOUS at 22:17

## 2025-05-11 RX ADMIN — CEFTRIAXONE 100 MILLIGRAM(S): 500 INJECTION, POWDER, FOR SOLUTION INTRAMUSCULAR; INTRAVENOUS at 06:23

## 2025-05-11 RX ADMIN — DEXMEDETOMIDINE HYDROCHLORIDE IN SODIUM CHLORIDE 5.6 MICROGRAM(S)/KG/HR: 4 INJECTION INTRAVENOUS at 22:18

## 2025-05-11 RX ADMIN — Medication 100 MILLIEQUIVALENT(S): at 09:24

## 2025-05-11 RX ADMIN — AMPICILLIN SODIUM 216 GRAM(S): 1 INJECTION, POWDER, FOR SOLUTION INTRAMUSCULAR; INTRAVENOUS at 18:10

## 2025-05-11 NOTE — DIETITIAN INITIAL EVALUATION ADULT - NSFNSGIIOFT_GEN_A_CORE
05-10-25 @ 07:01  -  05-11-25 @ 07:00  --------------------------------------------------------  OUT:  Total OUT: 0 mL    Total NET: 360 mL      05-11-25 @ 07:01  -  05-11-25 @ 18:34  --------------------------------------------------------  OUT:  Total OUT: 0 mL    Total NET: 590 mL

## 2025-05-11 NOTE — DIETITIAN INITIAL EVALUATION ADULT - PERTINENT LABORATORY DATA
05-11    139  |  102  |  45[H]  ----------------------------<  166[H]  3.8   |  22  |  1.83[H]    Ca    8.3[L]      11 May 2025 13:36  Phos  2.8     05-11  Mg     2.2     05-11    TPro  5.8[L]  /  Alb  2.5[L]  /  TBili  0.4  /  DBili  x   /  AST  199[H]  /  ALT  231[H]  /  AlkPhos  102  05-11  POCT Blood Glucose.: 139 mg/dL (05-11-25 @ 18:16)  A1C with Estimated Average Glucose Result: 8.4 % (05-09-25 @ 17:21)

## 2025-05-11 NOTE — DIETITIAN INITIAL EVALUATION ADULT - OTHER CALCULATIONS
Pt is >100% ideal body weight in critical care setting, thus ideal body weight used for all calculations. Needs adjusted for advanced age and clinical condition/status, ICU.

## 2025-05-11 NOTE — DIETITIAN INITIAL EVALUATION ADULT - PERTINENT MEDS FT
MEDICATIONS  (STANDING):  ampicillin  IVPB      ampicillin  IVPB 2 Gram(s) IV Intermittent every 6 hours  aspirin  chewable 81 milliGRAM(s) Oral daily  cefTRIAXone   IVPB 2000 milliGRAM(s) IV Intermittent every 12 hours  chlorhexidine 0.12% Liquid 15 milliLiter(s) Oral Mucosa every 12 hours  dexMEDEtomidine Infusion 0.3 MICROgram(s)/kG/Hr (5.6 mL/Hr) IV Continuous <Continuous>  dextrose 5%. 1000 milliLiter(s) (50 mL/Hr) IV Continuous <Continuous>  dextrose 5%. 1000 milliLiter(s) (100 mL/Hr) IV Continuous <Continuous>  dextrose 50% Injectable 50 milliLiter(s) IV Push every 15 minutes  DOBUTamine Infusion 5 MICROgram(s)/kG/Min (11.2 mL/Hr) IV Continuous <Continuous>  furosemide Infusion 5 mG/Hr (2.5 mL/Hr) IV Continuous <Continuous>  glucagon  Injectable 1 milliGRAM(s) IntraMuscular once  heparin   Injectable 5000 Unit(s) SubCutaneous every 8 hours  insulin regular Infusion 5 Unit(s)/Hr (5 mL/Hr) IV Continuous <Continuous>  metolazone 5 milliGRAM(s) Oral once  norepinephrine Infusion 0.05 MICROgram(s)/kG/Min (3.5 mL/Hr) IV Continuous <Continuous>  phenylephrine    Infusion 0.5 MICROgram(s)/kG/Min (7 mL/Hr) IV Continuous <Continuous>  polyethylene glycol 3350 17 Gram(s) Oral every 24 hours  propofol Infusion 30 MICROgram(s)/kG/Min (13.4 mL/Hr) IV Continuous <Continuous>  senna 2 Tablet(s) Oral at bedtime  sodium chloride 0.9% lock flush 3 milliLiter(s) IV Push every 8 hours  vasopressin Infusion 0.08 Unit(s)/Min (12 mL/Hr) IV Continuous <Continuous>    MEDICATIONS  (PRN):  dextrose Oral Gel 15 Gram(s) Oral once PRN Blood Glucose LESS THAN 70 milliGRAM(s)/deciliter  fentaNYL    Injectable 25 MICROGram(s) IV Push every 4 hours PRN Severe Pain (7 - 10)

## 2025-05-11 NOTE — DIETITIAN INITIAL EVALUATION ADULT - OTHER INFO
This is a 79 year old male with Aortic valve endocarditis, pt has a past medical history significant for HTN, HLD, Type two diabetes, acute cholecystectomy complicated by pleural effusions requiring drainage who presented to [facility] with sob and weakness x 2 weeks. In ED, he was found to have elevated troponins, Cr 1.9 (baseline 0.75) [place] [address] and transaminitis. He was hypoxic in ED with CT chest showing multifocal PNA and acute respiratory failure. He was placed on HFNC and transferred to ICU. Blood cultures were + for gram positive cocci. TTE showed reduce LVEF with thickening of AV leaflets concerning for vegetation. Steroids were started.  He was placed on Vanc/Zosyn/Azithromycin and is now transferred to [facility] for management.     Pt seen at bedside for initial assessment-intubated (on AC-CMV mode), sedated (on precedex, propofol, RASS score goal of -2), on phenylephrine, dobutamine, vasopressin, norepinephrine drips/pressor support (MAP of 75 when RD was in pt's room). Afebrile. Nasogastric tube to suction in place. Pt was started with Glucerna 1.5 formula, it was running at goal of 60mL/hour this afternoon per RD observation and per nursing, which provides 1440mL total volume, 2160 calories, 120g protein, 1093mL free water. Last documented bowel movement on 5/10/25; rounded abdomen, soft/nontender per nursing, audible and normoactive bowel sounds noted. No known food allergies documented. Dosing weight ~164.5 pounds. Pt's ideal body weight is ~160 pounds. Pt is ~103% of ideal body weight. Generalized, trace edema documented, pitting 2+ bilateral ankle edema documented, bilateral pitting 2+ foot edema documented at this time. Pt noted with posterior coccyx wound documented at this time. Surgical incisions per chart. Zacarias score: 15. Labs reviewed: elevated POCT glucose (178, 194, 233), it has decreased throughout the day, BUN elevated (45), Creatinine elevated (1.92), serum Glucose (195), AST (222), ALT (248), low eGFR (35), medical team is aware, RD to monitor trends. Observed pt with no overt signs of muscle or fat wasting. Based on ASPEN guidelines, pt does not meet criteria for malnutrition at this time. Observed propofol infusing @ 6.72mL/hour (~177kcal/day). RD to follow up and remain available prn. Per nursing, pt's family has been involved with his care and at bedside at times; RD to follow up with family prn (they were not present when RD visited pt in room). Please see nutrition recommendations below.

## 2025-05-11 NOTE — PROGRESS NOTE ADULT - ASSESSMENT
79M w/ PMHx of DM, HTN, HLD, hx of hospitalization in [place] (1/2025, 2/2025) for sepsis 2/2 acute   cholecystitis (s/p cholecystectomy), c/b postsurgical hematoma, b/l pleural effusions s/p thoracentesis of R pleural effusion on 3/3/25 at [facility] [pleural fluid and BCx neg at the time].  Pt son is an ED RN at [employer] and tells me that pt had Enterococcal bacteremia while he was hospitalized in [place]. Source felt to be GI/cholecystitis at the time. Unsure of what Abx were given back then and for how long.   He subsequently went to rehab and eventually returned to [facility]. Was taken to [facility] for weakness and dyspnea x 2 wks with significant worsening 2d pta.  There he denied fever, chills, chest pain, nausea, vomiting, abdominal pain, diarrhea, dysuria; reported decreased   urination. There, he was tachypneic with O2 sats in the 80s on RA and low 90s on 4L O2 via NC. CXR with fluid overload vs pneumonia. WBC WNL with N predom, UA neg, UCx w <10k UGF.   CT c/a/p showed Interval development of multifocal central consolidative opacities throughout the lungs, particularly the upper lobes and left lower lobe, R pleural effusion was noted to have dec in size, new small L pleural effusion (complex per HIE crit care note), significant interval decrease in size of the subcapsular hematoma along the inferior aspect of the liver.  Patient reportedly met sepsis criteria and was admitted to the ICU for sepsis and was in resp distress. Was given Vanc/Piptazo/Azithro + stress steroids. Was on bipap.   5/8 BCx + (4/4 bottles) w [facility] speciated as E fecalis (S- Amp, Vanco).   5/9 BCx + (4/4) w E fecalis.   5/10 BCx ngtd  5/11 BCx spec recvd  Echo with aortic valve vegetations, EF 40-45%  Xferred to [facility] for possible surgical mgmt of AoV endocarditis and critical care, now on 3 pressors, dobutamine.   #Enterococcal bacteremia and AoV (NV) endocarditis  - Please send daily BCx until neg for 72 hr  - F/u surveillance BCx  - SHELLY when able  - Cont IV Ampicillin 2 gm q6h (renally adjusted)  - Cont IV Ceftriaxone 2gm q12h   - F/u CTS recs  ID Team 1 will follow.

## 2025-05-11 NOTE — PROGRESS NOTE ADULT - SUBJECTIVE AND OBJECTIVE BOX
Critically ill, ETT/MV, on multiple pressors. Maintaining uop>100ml/hr with bumex drip.     PHYSICAL EXAM:  Constitutional: NAD  Neck: No JVD  Respiratory: CTAB, no wheezes, rales or rhonchi  Cardiovascular: S1, S2, RRR, noted murmur in Ao foci.   Gastrointestinal: ND/NT, +BS  Extremities:  No peripheral edema in LEs.   Neurological: RASS -2, no focal motor deficits.   : + cabrera.     VITALS:  T(F): 100 (05-11-25 @ 05:01), Max: 100 (05-11-25 @ 05:01)  HR: 91 (05-11-25 @ 10:00)  BP: 126/56 (05-11-25 @ 00:00)  RR: 23 (05-11-25 @ 10:00)  SpO2: 100% (05-11-25 @ 10:00)  Wt(kg): --    05-09 @ 07:01  -  05-10 @ 07:00  --------------------------------------------------------  IN: 1810.8 mL / OUT: 635 mL / NET: 1175.8 mL    05-10 @ 07:01  -  05-11 @ 07:00  --------------------------------------------------------  IN: 3080.6 mL / OUT: 3745 mL / NET: -664.4 mL    05-11 @ 07:01  -  05-11 @ 10:50  --------------------------------------------------------  IN: 470.7 mL / OUT: 600 mL / NET: -129.3 mL          LABS:  05-11    140  |  104  |  45[H]  ----------------------------<  195[H]  4.4   |  23  |  1.92[H]    Ca    8.1[L]      11 May 2025 09:29  Phos  2.8     05-11  Mg     2.3     05-11    TPro  5.4[L]  /  Alb  2.2[L]  /  TBili  0.3  /  DBili      /  AST  222[H]  /  ALT  248[H]  /  AlkPhos  110  05-11                          9.6    7.70  )-----------( 158      ( 11 May 2025 09:29 )             28.7         ampicillin  IVPB      ampicillin  IVPB 2 Gram(s) IV Intermittent every 6 hours  aspirin  chewable 81 milliGRAM(s) Oral daily  cefTRIAXone   IVPB 2000 milliGRAM(s) IV Intermittent every 12 hours  chlorhexidine 0.12% Liquid 15 milliLiter(s) Oral Mucosa every 12 hours  dexMEDEtomidine Infusion 0.3 MICROgram(s)/kG/Hr IV Continuous <Continuous>  dextrose 5%. 1000 milliLiter(s) IV Continuous <Continuous>  dextrose 5%. 1000 milliLiter(s) IV Continuous <Continuous>  dextrose 50% Injectable 50 milliLiter(s) IV Push every 15 minutes  dextrose Oral Gel 15 Gram(s) Oral once PRN  DOBUTamine Infusion 5 MICROgram(s)/kG/Min IV Continuous <Continuous>  fentaNYL    Injectable 25 MICROGram(s) IV Push every 4 hours PRN  furosemide Infusion 5 mG/Hr IV Continuous <Continuous>  glucagon  Injectable 1 milliGRAM(s) IntraMuscular once  heparin   Injectable 5000 Unit(s) SubCutaneous every 8 hours  insulin regular Infusion 5 Unit(s)/Hr IV Continuous <Continuous>  norepinephrine Infusion 0.05 MICROgram(s)/kG/Min IV Continuous <Continuous>  phenylephrine    Infusion 0.5 MICROgram(s)/kG/Min IV Continuous <Continuous>  polyethylene glycol 3350 17 Gram(s) Oral every 24 hours  propofol Infusion 30 MICROgram(s)/kG/Min IV Continuous <Continuous>  senna 2 Tablet(s) Oral at bedtime  sodium chloride 0.9% lock flush 3 milliLiter(s) IV Push every 8 hours  vasopressin Infusion 0.08 Unit(s)/Min IV Continuous <Continuous>

## 2025-05-11 NOTE — PROGRESS NOTE ADULT - ASSESSMENT
79M with PMH of HTN, HLD, T2DM, who is now transferred from [facility] for IE, has TTE showing reduce LVEF with thickening of AV leaflets concerning for vegetation. Pending surgical intervention for aortic valve endocarditis.   [place] UA.     Last 24h net balance: -0.6L  Last 24h uop: 3.7L      -Imp: Non oliguric iATN.   Multifactorial iATN (hypotension secondary to cardiogenic shock, ?embolic events).   sCr 1.9, stable.     -Plan:  No contraindication for surgical intervention from Nephro standpoint.   Cont Lasix drip as per primary team. Has uop>100ml/hr currently.   Continue pressors/inotrope, aim for SBP>110 as possible  Obtain Renal US, Cystatin C and U. prot/creat.   Strict I&O. Keep Campuzano placed.   No indication for RRT at this point.

## 2025-05-11 NOTE — DIETITIAN INITIAL EVALUATION ADULT - ADD RECOMMEND
1. NPO  *consider the following regimen: WITH current propofol rate (6.72mL/hour providing 177 calories/day), consider the following: Glucerna 1.5 start at 20mL/hour, increase by 10mL/hour every 4-6 hours or as tolerated to GOAL of 55mL/hour x 24 hours; this provides ~1320mL volume from tube feeding, 2157 calories, ~110g protein, 1002mL free water; this meets ~29.8 calories/kg, 1.5g protein/kg ideal body weight (72.5kg), 23.7 nonprotein calories/kg ideal body weight  **IF propofol is discontinued, consider the following: Glucerna 1.5 start at 20mL/hour increase by 10mL/hour every 4-6 hours or as tolerated to GOAL of 56mL/hour x 24 hours (or 75mL/hour x 18 hours if 18 hour regimen is desired), providing ~1344mL from tube feeing, 2016 calories, ~112g protein, 1020mL free water, meeting ~27.8 calories/kg, 1.54g protein/kg ideal body weight (72.5kg)  **Maintain aspiration precautions at all times**  **Monitor for signs/symptoms of intolerances**  2. Monitor weights, GI function, skin integrity, labs/chemistry  3. Pain and bowel regimen per medical team  4. Align nutrition with goals of care at all times

## 2025-05-11 NOTE — PROGRESS NOTE ADULT - SUBJECTIVE AND OBJECTIVE BOX
Brief history :     24 hour events : 78 yo with recent complex medical history admitted to [facility] on 5/8 with worsening SOB    of note patient has been unwell since Feb 2025 - while traveling in [place] needed hospitalization and admission to rehab secondary to back pain.  While in nursing facility had abdominal pain and on workup was diagnosed with acute cholecystitis - which sound complicated ? GP perforation s/p lap cholecystectomy.  After this he was discharged back to Nursing Facilty   3/1 flew back to [place] - at which time soon after arrival was evaluated in [place] for SOB.  W/u showed right complex effusion s/p drainage (700 cc - cultures negative).  Echo per report nl EF 65-70% mild AI   3/3 CT IMPRESSION:    large hematoma (approximately 500 mL) centered in hepatorenal recess post   cholecystectomy.   Separate small simple appearing fluid pocket in right anterior pararenal space or retrocolic space,  differential is biloma versus seroma. Localized colitis adjacent to this fluid collection.   Pt improved after drainage of effusion - discharged with GI/Surgery follow up.  Of note all cultures during this admission were negative   5/2 outpatient MRCP -  Subhepatic hematoma, decreased in size.      5/8 readmitted with SOB - CXR bilateral bat wing appearance, CT scan showed multifocal central consolidative opacities throughout the lungs.  right pleural effusion and development of a new small left pleural effusion.  Significant interval decrease in size of the subcapsular hematoma along the inferior aspect of the liver.    Blood cultures + for GPC in Pairs (since speciated to E faecalis Amp sensitive)  5/9 ECHOEF 45 %.At least moderate aortic regurgitation. Mild mitral regurgitation.Mild to moderate tricuspid regurgitation. Thickened aortic leaflets  Transferred       ICU Vital Signs Last 24 Hrs  T(C): 37.8 (05-11-25 @ 05:01), Max: 37.8 (05-11-25 @ 05:01)  T(F): 100 (05-11-25 @ 05:01), Max: 100 (05-11-25 @ 05:01)  HR: 93 (05-11-25 @ 12:00) (87 - 101)  BP: 118/56 (05-11-25 @ 12:00) (118/56 - 126/56)  BP(mean): 80 (05-11-25 @ 12:00) (80 - 80)  ABP: 107/69 (05-11-25 @ 12:00) (92/40 - 148/42)  ABP(mean): 85 (05-11-25 @ 12:00) (58 - 85)  RR: 17 (05-11-25 @ 12:00) (12 - 32)  SpO2: 100% (05-11-25 @ 12:00) (96% - 100%)    I&O's Summary    10 May 2025 07:01  -  11 May 2025 07:00  --------------------------------------------------------  IN: 3080.6 mL / OUT: 3745 mL / NET: -664.4 mL    11 May 2025 07:01  -  11 May 2025 12:52  --------------------------------------------------------  IN: 689.6 mL / OUT: 945 mL / NET: -255.4 mL      Mode: CPAP with PS  FiO2: 40  PEEP: 8  PS: 16  MAP: 13  PIP: 24    ABG - ( 11 May 2025 11:09 )  pH: 7.46  /  pCO2: 31    /  pO2: 149   / HCO3: 22    / Base Excess: -0.9  /  SaO2: 99.8                            9.6    7.70  )-----------( 158      ( 11 May 2025 09:29 )             28.7     11 May 2025 09:29    140    |  104    |  45     ----------------------------<  195    4.4     |  23     |  1.92     Ca    8.1        11 May 2025 09:29  Phos  2.8       11 May 2025 09:29  Mg     2.3       11 May 2025 09:29    TPro  5.4    /  Alb  2.2    /  TBili  0.3    /  DBili  x      /  AST  222    /  ALT  248    /  AlkPhos  110    11 May 2025 09:29    PT/INR - ( 11 May 2025 09:29 )   PT: 12.4 sec;   INR: 1.08     PTT - ( 11 May 2025 09:29 )  PTT:28.3 sec    Culture - Bronchial (collected 09 May 2025 19:46)  Source: Bronchial Bronchial Lavage  Gram Stain (10 May 2025 12:20):    Rare polymorphonuclear leukocytes per low power field    Rare Squamous epithelial cells per low power field    No organisms seen per oil power field  Preliminary Report (11 May 2025 08:29):    Commensal usha consistent with body site    Urinalysis with Rflx Culture (collected 09 May 2025 18:19)    Culture - Blood (collected 09 May 2025 17:21)  Source: Blood Blood-Peripheral  Gram Stain (11 May 2025 00:58):    Growth in anaerobic bottle: Gram Positive Cocci in Pairs and Chains    Growth in aerobic bottle: Gram Positive Cocci in Pairs and Chains  Preliminary Report (11 May 2025 00:58):    Growth in anaerobic bottle: Gram Positive Cocci in Pairs and Chains    Growth in aerobic bottle: Gram Positive Cocci in Pairs and Chains    Culture - Blood (collected 09 May 2025 17:21)  Source: Blood Blood-Peripheral  Gram Stain (10 May 2025 21:38):    Growth in aerobic and anaerobic bottles: Gram Positive Cocci in Pairs and    Chains  Preliminary Report (10 May 2025 21:39):    Growth in aerobic and anaerobic bottles: Gram Positive Cocci in Pairs and    Chains      MEDICATIONS  (STANDING):  ampicillin  IVPB      ampicillin  IVPB 2 Gram(s) IV Intermittent every 6 hours  aspirin  chewable 81 milliGRAM(s) Oral daily  cefTRIAXone   IVPB 2000 milliGRAM(s) IV Intermittent every 12 hours  chlorhexidine 0.12% Liquid 15 milliLiter(s) Oral Mucosa every 12 hours  dexMEDEtomidine Infusion 0.3 MICROgram(s)/kG/Hr IV Continuous <Continuous>  dextrose 5%. 1000 milliLiter(s) IV Continuous <Continuous>  dextrose 5%. 1000 milliLiter(s) IV Continuous <Continuous>  dextrose 50% Injectable 50 milliLiter(s) IV Push every 15 minutes  DOBUTamine Infusion 5 MICROgram(s)/kG/Min IV Continuous <Continuous>  furosemide Infusion 5 mG/Hr IV Continuous <Continuous>  glucagon  Injectable 1 milliGRAM(s) IntraMuscular once  heparin   Injectable 5000 Unit(s) SubCutaneous every 8 hours  insulin regular Infusion 5 Unit(s)/Hr IV Continuous <Continuous>  norepinephrine Infusion 0.05 MICROgram(s)/kG/Min IV Continuous <Continuous>  phenylephrine    Infusion 0.5 MICROgram(s)/kG/Min IV Continuous <Continuous>  polyethylene glycol 3350 17 Gram(s) Oral every 24 hours  propofol Infusion 30 MICROgram(s)/kG/Min IV Continuous <Continuous>  senna 2 Tablet(s) Oral at bedtime  sodium chloride 0.9% lock flush 3 milliLiter(s) IV Push every 8 hours  vasopressin Infusion 0.08 Unit(s)/Min IV Continuous <Continuous>        Home Medications:  aspirin 81 mg oral capsule: 1 cap(s) orally (09 May 2025 18:00)  bifidobacterium infantis: 60 milligram(s) once a day (09 May 2025 17:57)  cyanocobalamin 500 mcg oral tablet, chewable: 2 tab(s) chewed (09 May 2025 18:00)  dulaglutide 0.75 mg/0.5 mL subcutaneous solution: 0.75 milligram(s) subcutaneously once a week (09 May 2025 17:57)  FeroSul 325 mg (65 mg elemental iron) oral tablet: 1 tab(s) orally (09 May 2025 18:00)  icosapent 1 g oral capsule: 2 cap(s) orally (09 May 2025 17:57)  losartan 50 mg oral tablet: 1 tab(s) orally once a day (09 May 2025 17:57)  metFORMIN 500 mg oral tablet: 1 tab(s) orally 2 times a day (09 May 2025 17:57)    PHYSICAL EXAM:  Gen : no acute distress  Neck: No LAD, No JVD  Respiratory: decreased in the bases  Cardiovascular: S1 and S2, RRR, no M/G/R  Gastrointestinal: BS+, soft, NT/ND  Extremities: No peripheral edema  Vascular: 2+ peripheral pulses  Neurological: A/O x 3, no focal deficits  Incision: clean dry/ no sign of infection  Lines: no sign of infection      S/p   Acute post operative respiratory insufficiency  Acute blood loss anemia/Thrombocytopenia  Electrolyte abnormalities - Hypomag/Hypokalcemia  Hypotension   Cardiogenic shock  Post operative pain   Lactic acidosis  Post op AFib    Encounter for ventilator weaning      Neuro  Neuro assessment  Sedation   Multimodal pain management    CVS   s/p   EF  Pressors -   Inotrope -   MCS -   Rhythm  Chest tube management    Pulm   Ventilator weaning as tolerated   Fast track extubation     GI   GI proph/Bowel regimen       Monitor UOP  Correct Electrolytes K >4.0-4.5 Mag 2.0-2.5    Endo   A1c  Glycemic control per protocl Glucose <180  Thyroid     Heme   Correct coagulapathy, monitor for bleeding  ASA  P2Y12  DVT proph  Acute blood loss anemia expected post operative loss - IV Fe, Epo    ID   Periop antibiotics  MRSA screen         Critical care time spent    min  Brief history :     24 hour events : 78 yo with recent complex medical history admitted to [facility] on 5/8 with worsening SOB    of note patient has been unwell since Feb 2025 - while traveling in [place] needed hospitalization and admission to rehab secondary to back pain.  While in nursing facility had abdominal pain and on workup was diagnosed with acute cholecystitis - which sound complicated ? GP perforation s/p lap cholecystectomy.  After this he was discharged back to Nursing Facilty   3/1 flew back to [place] - at which time soon after arrival was evaluated in [facility] for SOB.  W/u showed right complex effusion s/p drainage (700 cc - cultures negative).  Echo per report nl EF 65-70% mild AI   3/3 CT IMPRESSION:    large hematoma (approximately 500 mL) centered in hepatorenal recess post   cholecystectomy.   Separate small simple appearing fluid pocket in right anterior pararenal space or retrocolic space,  differential is biloma versus seroma. Localized colitis adjacent to this fluid collection.   Pt improved after drainage of effusion - discharged with GI/Surgery follow up.  Of note all cultures during this admission were negative   5/2 outpatient MRCP -  Subhepatic hematoma, decreased in size.      5/8 readmitted with SOB - CXR bilateral bat wing appearance, CT scan showed multifocal central consolidative opacities throughout the lungs.  right pleural effusion and development of a new small left pleural effusion.  Significant interval decrease in size of the subcapsular hematoma along the inferior aspect of the liver.  BNP 22333, elevated trop   Blood cultures + for GPC in Pairs (since speciated to E faecalis Amp sensitive)  5/9 ECHO: EF 45 %.At least moderate aortic regurgitation. Mild mitral regurgitation.Mild to moderate tricuspid regurgitation. Thickened aortic leaflets  Transferred to [facility] for concern for Aortic valve endocarditis.    Intubated on arrival, PA cath inserted   treated for mixed shock - cardiogenic/septic shock     ICU Vital Signs Last 24 Hrs  T(C): 37.8 (05-11-25 @ 05:01), Max: 37.8 (05-11-25 @ 05:01)  T(F): 100 (05-11-25 @ 05:01), Max: 100 (05-11-25 @ 05:01)  HR: 93 (05-11-25 @ 12:00) (87 - 101)  BP: 118/56 (05-11-25 @ 12:00) (118/56 - 126/56)  BP(mean): 80 (05-11-25 @ 12:00) (80 - 80)  ABP: 107/69 (05-11-25 @ 12:00) (92/40 - 148/42)  ABP(mean): 85 (05-11-25 @ 12:00) (58 - 85)  RR: 17 (05-11-25 @ 12:00) (12 - 32)  SpO2: 100% (05-11-25 @ 12:00) (96% - 100%)    I&O's Summary    10 May 2025 07:01  -  11 May 2025 07:00  --------------------------------------------------------  IN: 3080.6 mL / OUT: 3745 mL / NET: -664.4 mL    11 May 2025 07:01  -  11 May 2025 12:52  --------------------------------------------------------  IN: 689.6 mL / OUT: 945 mL / NET: -255.4 mL      Mode: CPAP with PS  FiO2: 40  PEEP: 8  PS: 16  MAP: 13  PIP: 24    ABG - ( 11 May 2025 11:09 )  pH: 7.46  /  pCO2: 31    /  pO2: 149   / HCO3: 22    / Base Excess: -0.9  /  SaO2: 99.8                            9.6    7.70  )-----------( 158      ( 11 May 2025 09:29 )             28.7     11 May 2025 09:29    140    |  104    |  45     ----------------------------<  195    4.4     |  23     |  1.92     Ca    8.1        11 May 2025 09:29  Phos  2.8       11 May 2025 09:29  Mg     2.3       11 May 2025 09:29    TPro  5.4    /  Alb  2.2    /  TBili  0.3    /  DBili  x      /  AST  222    /  ALT  248    /  AlkPhos  110    11 May 2025 09:29    PT/INR - ( 11 May 2025 09:29 )   PT: 12.4 sec;   INR: 1.08     PTT - ( 11 May 2025 09:29 )  PTT:28.3 sec    Culture - Bronchial (collected 09 May 2025 19:46)  Source: Bronchial Bronchial Lavage  Preliminary Report (11 May 2025 08:29):    Commensal usha consistent with body site    Urinalysis with Rflx Culture (collected 09 May 2025 18:19)    Culture - Blood (collected 09 May 2025 17:21)  Source: Blood Blood-Peripheral  Gram Stain (11 May 2025 00:58):    Growth in anaerobic bottle: Gram Positive Cocci in Pairs and Chains    Growth in aerobic bottle: Gram Positive Cocci in Pairs and Chains  Preliminary Report (11 May 2025 00:58):    Growth in anaerobic bottle: Gram Positive Cocci in Pairs and Chains    Growth in aerobic bottle: Gram Positive Cocci in Pairs and Chains    Culture - Blood (collected 09 May 2025 17:21)  Source: Blood Blood-Peripheral  Gram Stain (10 May 2025 21:38):    Growth in aerobic and anaerobic bottles: Gram Positive Cocci in Pairs and    Chains  Preliminary Report (10 May 2025 21:39):    Growth in aerobic and anaerobic bottles: Gram Positive Cocci in Pairs and    Chains      MEDICATIONS  (STANDING):   ampicillin  IVPB 2 Gram(s) IV Intermittent every 6 hours  aspirin  chewable 81 milliGRAM(s) Oral daily  cefTRIAXone   IVPB 2000 milliGRAM(s) IV Intermittent every 12 hours  dexMEDEtomidine Infusion 0.3 MICROgram(s)/kG/Hr IV Continuous <Continuous>  DOBUTamine Infusion 5 MICROgram(s)/kG/Min IV Continuous <Continuous>  furosemide Infusion 5 mG/Hr IV Continuous <Continuous>  glucagon  Injectable 1 milliGRAM(s) IntraMuscular once  heparin   Injectable 5000 Unit(s) SubCutaneous every 8 hours  insulin regular Infusion 5 Unit(s)/Hr IV Continuous <Continuous>  norepinephrine Infusion 0.05 MICROgram(s)/kG/Min IV Continuous <Continuous>  phenylephrine    Infusion 0.5 MICROgram(s)/kG/Min IV Continuous <Continuous>  polyethylene glycol 3350 17 Gram(s) Oral every 24 hours  propofol Infusion 30 MICROgram(s)/kG/Min IV Continuous <Continuous>  senna 2 Tablet(s) Oral at bedtime  vasopressin Infusion 0.08 Unit(s)/Min IV Continuous <Continuous>        Home Medications:  aspirin 81 mg oral capsule: 1 cap(s) orally (09 May 2025 18:00)  bifidobacterium infantis: 60 milligram(s) once a day (09 May 2025 17:57)  cyanocobalamin 500 mcg oral tablet, chewable: 2 tab(s) chewed (09 May 2025 18:00)  dulaglutide 0.75 mg/0.5 mL subcutaneous solution: 0.75 milligram(s) subcutaneously once a week (09 May 2025 17:57)  FeroSul 325 mg (65 mg elemental iron) oral tablet: 1 tab(s) orally (09 May 2025 18:00)  icosapent 1 g oral capsule: 2 cap(s) orally (09 May 2025 17:57)  losartan 50 mg oral tablet: 1 tab(s) orally once a day (09 May 2025 17:57)  metFORMIN 500 mg oral tablet: 1 tab(s) orally 2 times a day (09 May 2025 17:57)    PHYSICAL EXAM:  Gen : intubated sedate, but interactive   Neck: No LAD, No JVD  Respiratory: decreased in the bases  Cardiovascular: S1 and S2, RRR, no M/G/R  Gastrointestinal: BS+, soft, NT/ND  Extremities: cool and clammy   Vascular: 2+ peripheral pulses  Neurological: A/O x 3, no focal deficits  Incision: clean dry/ no sign of infection  Lines: no sign of infection      Acute hypoxic respiratory failur   Enterococcus bacteremia/ Endocarditis   Septic shock       Neuro  Neuro assessment  Sedation   Multimodal pain management    CVS : combination of septic shock and cardiogenic shock  continue pressor and intropic support  conintue diursis    Pulm   Ventilator weaning as tolerated   CXR improving , improving vent requirement     GI   GI proph/Bowel regimen  Transaminitis - stable to improving        Cr at baseline  Monitor UOP    Endo   A1c 8.4  Glycemic control    Heme   DVT prophylaxis      ID   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  While in nursing facility had abdominal pain and on workup was diagnosed with acute cholecystitis - which sound complicated ? GP perforation s/p lap cholecystectomy.  After this he was discharged back to Nursing Facilty   3/1 flew back to [place] - at which time soon after arrival was evaluated in [place] for SOB.  W/u showed right complex effusion s/p drainage (700 cc - cultures negative).  Echo per report nl EF 65-70% mild AI   3/3 CT IMPRESSION:    large hematoma (approximately 500 mL) centered in hepatorenal recess post   cholecystectomy.   Separate small simple appearing fluid pocket in right anterior pararenal space or retrocolic space,  differential is biloma versus seroma. Localized colitis adjacent to this fluid collection.   Pt improved after drainage of effusion - discharged with GI/Surgery follow up.  Of note all cultures during this admission were negative   5/2 outpatient MRCP -  Subhepatic hematoma, decreased in size.      5/8 readmitted with SOB - CXR bilateral bat wing appearance, CT scan showed multifocal central consolidative opacities throughout the lungs.  right pleural effusion and development of a new small left pleural effusion.  Significant interval decrease in size of the subcapsular hematoma along the inferior aspect of the liver.  BNP 96364, elevated trop   Blood cultures + for GPC in Pairs (since speciated to E faecalis Amp sensitive)  5/9 ECHO: EF 45 %.At least moderate aortic regurgitation. Mild mitral regurgitation.Mild to moderate tricuspid regurgitation. Thickened aortic leaflets  Transferred to [facility] for concern for Aortic valve endocarditis.    Intubated on arrival, PA cath inserted   treated for mixed shock - cardiogenic/septic shock     ICU Vital Signs Last 24 Hrs  T(C): 37.8 (05-11-25 @ 05:01), Max: 37.8 (05-11-25 @ 05:01)  T(F): 100 (05-11-25 @ 05:01), Max: 100 (05-11-25 @ 05:01)  HR: 93 (05-11-25 @ 12:00) (87 - 101)  BP: 118/56 (05-11-25 @ 12:00) (118/56 - 126/56)  BP(mean): 80 (05-11-25 @ 12:00) (80 - 80)  ABP: 107/69 (05-11-25 @ 12:00) (92/40 - 148/42)  ABP(mean): 85 (05-11-25 @ 12:00) (58 - 85)  RR: 17 (05-11-25 @ 12:00) (12 - 32)  SpO2: 100% (05-11-25 @ 12:00) (96% - 100%)    I&O's Summary    10 May 2025 07:01  -  11 May 2025 07:00  --------------------------------------------------------  IN: 3080.6 mL / OUT: 3745 mL / NET: -664.4 mL    11 May 2025 07:01  -  11 May 2025 12:52  --------------------------------------------------------  IN: 689.6 mL / OUT: 945 mL / NET: -255.4 mL      Mode: CPAP with PS  FiO2: 40  PEEP: 8  PS: 16  MAP: 13  PIP: 24    ABG - ( 11 May 2025 11:09 )  pH: 7.46  /  pCO2: 31    /  pO2: 149   / HCO3: 22    / Base Excess: -0.9  /  SaO2: 99.8                            9.6    7.70  )-----------( 158      ( 11 May 2025 09:29 )             28.7     11 May 2025 09:29    140    |  104    |  45     ----------------------------<  195    4.4     |  23     |  1.92     Ca    8.1        11 May 2025 09:29  Phos  2.8       11 May 2025 09:29  Mg     2.3       11 May 2025 09:29    TPro  5.4    /  Alb  2.2    /  TBili  0.3    /  DBili  x      /  AST  222    /  ALT  248    /  AlkPhos  110    11 May 2025 09:29    PT/INR - ( 11 May 2025 09:29 )   PT: 12.4 sec;   INR: 1.08     PTT - ( 11 May 2025 09:29 )  PTT:28.3 sec    Culture - Bronchial (collected 09 May 2025 19:46)  Source: Bronchial Bronchial Lavage  Preliminary Report (11 May 2025 08:29):    Commensal usha consistent with body site    Urinalysis with Rflx Culture (collected 09 May 2025 18:19)    Culture - Blood (collected 09 May 2025 17:21)  Source: Blood Blood-Peripheral  Gram Stain (11 May 2025 00:58):    Growth in anaerobic bottle: Gram Positive Cocci in Pairs and Chains    Growth in aerobic bottle: Gram Positive Cocci in Pairs and Chains  Preliminary Report (11 May 2025 00:58):    Growth in anaerobic bottle: Gram Positive Cocci in Pairs and Chains    Growth in aerobic bottle: Gram Positive Cocci in Pairs and Chains    Culture - Blood (collected 09 May 2025 17:21)  Source: Blood Blood-Peripheral  Gram Stain (10 May 2025 21:38):    Growth in aerobic and anaerobic bottles: Gram Positive Cocci in Pairs and    Chains  Preliminary Report (10 May 2025 21:39):    Growth in aerobic and anaerobic bottles: Gram Positive Cocci in Pairs and    Chains      MEDICATIONS  (STANDING):   ampicillin  IVPB 2 Gram(s) IV Intermittent every 6 hours  aspirin  chewable 81 milliGRAM(s) Oral daily  cefTRIAXone   IVPB 2000 milliGRAM(s) IV Intermittent every 12 hours  dexMEDEtomidine Infusion 0.3 MICROgram(s)/kG/Hr IV Continuous <Continuous>  DOBUTamine Infusion 5 MICROgram(s)/kG/Min IV Continuous <Continuous>  furosemide Infusion 5 mG/Hr IV Continuous <Continuous>  glucagon  Injectable 1 milliGRAM(s) IntraMuscular once  heparin   Injectable 5000 Unit(s) SubCutaneous every 8 hours  insulin regular Infusion 5 Unit(s)/Hr IV Continuous <Continuous>  norepinephrine Infusion 0.05 MICROgram(s)/kG/Min IV Continuous <Continuous>  phenylephrine    Infusion 0.5 MICROgram(s)/kG/Min IV Continuous <Continuous>  polyethylene glycol 3350 17 Gram(s) Oral every 24 hours  propofol Infusion 30 MICROgram(s)/kG/Min IV Continuous <Continuous>  senna 2 Tablet(s) Oral at bedtime  vasopressin Infusion 0.08 Unit(s)/Min IV Continuous <Continuous>        Home Medications:  aspirin 81 mg oral capsule: 1 cap(s) orally (09 May 2025 18:00)  bifidobacterium infantis: 60 milligram(s) once a day (09 May 2025 17:57)  cyanocobalamin 500 mcg oral tablet, chewable: 2 tab(s) chewed (09 May 2025 18:00)  dulaglutide 0.75 mg/0.5 mL subcutaneous solution: 0.75 milligram(s) subcutaneously once a week (09 May 2025 17:57)  FeroSul 325 mg (65 mg elemental iron) oral tablet: 1 tab(s) orally (09 May 2025 18:00)  icosapent 1 g oral capsule: 2 cap(s) orally (09 May 2025 17:57)  losartan 50 mg oral tablet: 1 tab(s) orally once a day (09 May 2025 17:57)  metFORMIN 500 mg oral tablet: 1 tab(s) orally 2 times a day (09 May 2025 17:57)    PHYSICAL EXAM:  Gen : intubated sedate, but interactive   Neck: No LAD, No JVD  Respiratory: decreased in the bases  Cardiovascular: S1 and S2, RRR, no M/G/R  Gastrointestinal: BS+, soft, NT/ND  Extremities: cool and clammy   Vascular: 2+ peripheral pulses  Neurological: A/O x 3, no focal deficits  Incision: clean dry/ no sign of infection  Lines: no sign of infection      Acute hypoxic respiratory failur   Enterococcus bacteremia/ Endocarditis   Septic shock       Neuro  Neuro assessment  Sedation   Multimodal pain management    CVS : combination of septic shock and cardiogenic shock  continue pressor and intropic support - may need to add epi if CO/CI and SVO2 Remain low.  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

## 2025-05-11 NOTE — PROGRESS NOTE ADULT - SUBJECTIVE AND OBJECTIVE BOX
INTERVAL HPI/OVERNIGHT EVENTS:    CONSTITUTIONAL:  No fever, chills, night sweats  EYES:  No photophobia or visual changes  CARDIOVASCULAR:  No chest pain  RESPIRATORY:  No cough, wheezing, or SOB   GASTROINTESTINAL:  No nausea, vomiting, diarrhea, constipation, or abdominal pain  GENITOURINARY:  No frequency, urgency, dysuria or hematuria  NEUROLOGIC:  No headache, lightheadedness      ANTIBIOTICS/RELEVANT:          Vital Signs Last 24 Hrs  T(C): 35.7 (11 May 2025 20:44), Max: 37.8 (11 May 2025 05:01)  T(F): 96.2 (11 May 2025 20:44), Max: 100 (11 May 2025 05:01)  HR: 92 (11 May 2025 23:00) (87 - 98)  BP: 95/46 (11 May 2025 23:00) (94/46 - 126/56)  BP(mean): 67 (11 May 2025 23:00) (67 - 85)  RR: 17 (11 May 2025 23:00) (15 - 32)  SpO2: 99% (11 May 2025 23:00) (96% - 100%)    Parameters below as of 11 May 2025 23:00  Patient On (Oxygen Delivery Method): ventilator    O2 Concentration (%): 40    PHYSICAL EXAM:  Constitutional:  Alert  Eyes:  Sclerae anicteric, conjunctivae clear, PERRL  Ear/Nose/Throat:  No nasal exudate or sinus tenderness;  No buccal mucosal lesions, no pharyngeal erythema or exudate	  Neck:  Supple, no adenopathy  Respiratory:  Clear bilaterally  Cardiovascular:  RRR, S1S2, no murmur appreciated  Gastrointestinal:  Symmetric, normoactive BS, soft, NT, no masses, guarding or rebound.  No HSM  Extremities:  No edema      LABS:                        8.3    4.52  )-----------( 109      ( 11 May 2025 18:25 )             25.3         05-11    141  |  105  |  42[H]  ----------------------------<  143[H]  3.9   |  24  |  1.76[H]    Ca    8.3[L]      11 May 2025 18:25  Phos  2.8     05-11  Mg     2.0     05-11    TPro  5.1[L]  /  Alb  2.3[L]  /  TBili  0.3  /  DBili  x   /  AST  142[H]  /  ALT  189[H]  /  AlkPhos  86  05-11      Urinalysis Basic - ( 11 May 2025 18:25 )    Color: x / Appearance: x / SG: x / pH: x  Gluc: 143 mg/dL / Ketone: x  / Bili: x / Urobili: x   Blood: x / Protein: x / Nitrite: x   Leuk Esterase: x / RBC: x / WBC x   Sq Epi: x / Non Sq Epi: x / Bacteria: x        MICROBIOLOGY:        RADIOLOGY & ADDITIONAL STUDIES: INTERVAL HPI/OVERNIGHT EVENTS: Intubated, on 3 pressors, dexmed, dobutamine.     ROS unobtainable.     ANTIBIOTICS/RELEVANT:      ampicillin  IVPB   216 mL/Hr IV Intermittent (05-10-25 @ 14:23)    ampicillin  IVPB   216 mL/Hr IV Intermittent (05-12-25 @ 00:52)   216 mL/Hr IV Intermittent (05-11-25 @ 18:10)   216 mL/Hr IV Intermittent (05-11-25 @ 11:12)   216 mL/Hr IV Intermittent (05-11-25 @ 06:23)   216 mL/Hr IV Intermittent (05-10-25 @ 23:50)   216 mL/Hr IV Intermittent (05-10-25 @ 18:13)    azithromycin  IVPB   255 mL/Hr IV Intermittent (05-09-25 @ 20:46)    cefTRIAXone   IVPB   100 mL/Hr IV Intermittent (05-11-25 @ 17:15)   100 mL/Hr IV Intermittent (05-11-25 @ 06:23)   100 mL/Hr IV Intermittent (05-10-25 @ 19:20)    piperacillin/tazobactam IVPB.   200 mL/Hr IV Intermittent (05-09-25 @ 18:59)    piperacillin/tazobactam IVPB.-   25 mL/Hr IV Intermittent (05-09-25 @ 23:15)    piperacillin/tazobactam IVPB..   25 mL/Hr IV Intermittent (05-10-25 @ 05:14)    vancomycin  IVPB   300 mL/Hr IV Intermittent (05-09-25 @ 21:46)          Vital Signs Last 24 Hrs  T(C): 35.7 (11 May 2025 20:44), Max: 37.8 (11 May 2025 05:01)  T(F): 96.2 (11 May 2025 20:44), Max: 100 (11 May 2025 05:01)  HR: 92 (11 May 2025 23:00) (87 - 98)  BP: 95/46 (11 May 2025 23:00) (94/46 - 126/56)  BP(mean): 67 (11 May 2025 23:00) (67 - 85)  RR: 17 (11 May 2025 23:00) (15 - 32)  SpO2: 99% (11 May 2025 23:00) (96% - 100%)    Parameters below as of 11 May 2025 23:00  Patient On (Oxygen Delivery Method): ventilator    O2 Concentration (%): 40    PHYSICAL EXAM:  Constitutional:  elderly male  Eyes:  Sclerae anicteric, conjunctivae clear, PERRL  Ear/Nose/Throat:  No nasal exudate or sinus tenderness  Neck:  Supple, no adenopathy  Respiratory:  mechanical breath sounds  Cardiovascular:  RRR, S1S2, no murmur appreciated  Gastrointestinal:  Symmetric, normoactive BS, soft, NT  Extremities:  No edema      LABS:                        8.3    4.52  )-----------( 109      ( 11 May 2025 18:25 )             25.3         05-11    141  |  105  |  42[H]  ----------------------------<  143[H]  3.9   |  24  |  1.76[H]    Ca    8.3[L]      11 May 2025 18:25  Phos  2.8     05-11  Mg     2.0     05-11    TPro  5.1[L]  /  Alb  2.3[L]  /  TBili  0.3  /  DBili  x   /  AST  142[H]  /  ALT  189[H]  /  AlkPhos  86  05-11      Urinalysis Basic - ( 11 May 2025 18:25 )    Color: x / Appearance: x / SG: x / pH: x  Gluc: 143 mg/dL / Ketone: x  / Bili: x / Urobili: x   Blood: x / Protein: x / Nitrite: x   Leuk Esterase: x / RBC: x / WBC x   Sq Epi: x / Non Sq Epi: x / Bacteria: x      MICROBIOLOGY:    Culture - Blood (collected 10 May 2025 18:10)  Source: Blood Blood-Peripheral  Preliminary Report (11 May 2025 23:01):    No growth at 24 hours    Culture - Bronchial (collected 09 May 2025 19:46)  Source: Bronchial Bronchial Lavage  Gram Stain (10 May 2025 12:20):    Rare polymorphonuclear leukocytes per low power field    Rare Squamous epithelial cells per low power field    No organisms seen per oil power field  Final Report (11 May 2025 14:13):    Commensal usha consistent with body site    Urinalysis with Rflx Culture (collected 09 May 2025 18:19)    Culture - Blood (collected 09 May 2025 17:21)  Source: Blood Blood-Peripheral  Gram Stain (11 May 2025 00:58):    Growth in anaerobic bottle: Gram Positive Cocci in Pairs and Chains    Growth in aerobic bottle: Gram Positive Cocci in Pairs and Chains  Preliminary Report (11 May 2025 18:37):    Growth in aerobic and anaerobic bottles: Enterococcus faecalis    Culture - Blood (collected 09 May 2025 17:21)  Source: Blood Blood-Peripheral  Gram Stain (10 May 2025 21:38):    Growth in aerobic and anaerobic bottles: Gram Positive Cocci in Pairs and    Chains  Final Report (11 May 2025 13:51):    Growth in aerobic and anaerobic bottles: Enterococcus faecalis    See previous culture [id number]      RADIOLOGY & ADDITIONAL STUDIES:

## 2025-05-11 NOTE — DIETITIAN INITIAL EVALUATION ADULT - NS FNS DIET ORDER
Diet, NPO with Tube Feed:   Tube Feeding Modality: Nasogastric  Glucerna 1.5 Tyree (GLUCERNA1.5)  Total Volume for 24 Hours (mL): 1440  Total Number of Cans: 6  Continuous  Starting Tube Feed Rate {mL per Hour}: 30  Increase Tube Feed Rate by (mL): 10     Every 4 hours  Until Goal Tube Feed Rate (mL per Hour): 60  Tube Feed Duration (in Hours): 24  Tube Feed Start Time: 01:00 (05-11-25 @ 01:03)

## 2025-05-11 NOTE — DIETITIAN INITIAL EVALUATION ADULT - ENTER FROM (ML/KG)
Discharge Instructions    Activity:  Â· Avoid strenuous activity and lifting for 48 hours (2 Days) unless told otherwise by the doctor. Consult with your physician for any further instructions. Â· If you have received sedation or pain medication, DO NOT:  * Drive, Drink alcohol, operate machinery, sign legal documents, or make legal decisions for at least 24 hours. Diet:  Â· You may resume your normal diet. Medications:  Â· Take your regular medications unless directed otherwise. Wound Care:  Â· If you are tender over the site of the injection, then you may use an ice pack wrapped in a towel for 20 minutes, 3-4 times per day. Â· DO NOT apply heat to the area. Â· OK to remove bandaids tonight or in the AM.  Â· No tub bath or soaking in water (pool, [place], etc) for the next 2-3 days. Showering is fine. Pain:  Â· Your usual pain may get slightly worse over the next 24-48 hours. Call for any unusual pain or discomfort beyond this time. Â· You may experience prolonged drowsiness or confusion if you received sedation. Â· There may be swelling, redness, drainage, or pain at the injection site or IV site. When to call the doctor:  Â· If you experience severe back or neck pain  Â· New numbness or weakness of your arms or legs  Â· Loss of control of your bladder or bowels  Â· Signs of infection in the area of the injection or IV site, such as excessive swelling, redness, warmth, fever >101, etc,  Â· IF IT IS AN EMERGENCY, CALL 911. Follow Up:  Â· Make a follow up appointment in about 1-2 weeks. 30

## 2025-05-12 LAB
-  AMPICILLIN: SIGNIFICANT CHANGE UP
-  GENTAMICIN SYNERGY: SIGNIFICANT CHANGE UP
-  STREPTOMYCIN SYNERGY: SIGNIFICANT CHANGE UP
-  VANCOMYCIN: SIGNIFICANT CHANGE UP
ALBUMIN SERPL ELPH-MCNC: 2.5 G/DL — LOW (ref 3.3–5)
ALBUMIN SERPL ELPH-MCNC: 2.5 G/DL — LOW (ref 3.3–5)
ALBUMIN SERPL ELPH-MCNC: 2.7 G/DL — LOW (ref 3.3–5)
ALBUMIN SERPL ELPH-MCNC: 2.8 G/DL — LOW (ref 3.3–5)
ALP SERPL-CCNC: 77 U/L — SIGNIFICANT CHANGE UP (ref 40–120)
ALP SERPL-CCNC: 81 U/L — SIGNIFICANT CHANGE UP (ref 40–120)
ALP SERPL-CCNC: 85 U/L — SIGNIFICANT CHANGE UP (ref 40–120)
ALP SERPL-CCNC: 88 U/L — SIGNIFICANT CHANGE UP (ref 40–120)
ALT FLD-CCNC: 116 U/L — HIGH (ref 10–45)
ALT FLD-CCNC: 132 U/L — HIGH (ref 10–45)
ALT FLD-CCNC: 141 U/L — HIGH (ref 10–45)
ALT FLD-CCNC: 155 U/L — HIGH (ref 10–45)
ANION GAP SERPL CALC-SCNC: 10 MMOL/L — SIGNIFICANT CHANGE UP (ref 5–17)
ANION GAP SERPL CALC-SCNC: 12 MMOL/L — SIGNIFICANT CHANGE UP (ref 5–17)
ANION GAP SERPL CALC-SCNC: 13 MMOL/L — SIGNIFICANT CHANGE UP (ref 5–17)
ANION GAP SERPL CALC-SCNC: 14 MMOL/L — SIGNIFICANT CHANGE UP (ref 5–17)
APTT BLD: 27 SEC — SIGNIFICANT CHANGE UP (ref 26.1–36.8)
APTT BLD: 27.3 SEC — SIGNIFICANT CHANGE UP (ref 26.1–36.8)
APTT BLD: 27.5 SEC — SIGNIFICANT CHANGE UP (ref 26.1–36.8)
APTT BLD: 27.5 SEC — SIGNIFICANT CHANGE UP (ref 26.1–36.8)
AST SERPL-CCNC: 112 U/L — HIGH (ref 10–40)
AST SERPL-CCNC: 75 U/L — HIGH (ref 10–40)
AST SERPL-CCNC: 86 U/L — HIGH (ref 10–40)
AST SERPL-CCNC: 93 U/L — HIGH (ref 10–40)
BASE EXCESS BLDV CALC-SCNC: 4.9 MMOL/L — HIGH (ref -2–3)
BILIRUB SERPL-MCNC: 0.4 MG/DL — SIGNIFICANT CHANGE UP (ref 0.2–1.2)
BLD GP AB SCN SERPL QL: NEGATIVE — SIGNIFICANT CHANGE UP
BUN SERPL-MCNC: 41 MG/DL — HIGH (ref 7–23)
BUN SERPL-MCNC: 43 MG/DL — HIGH (ref 7–23)
BUN SERPL-MCNC: 44 MG/DL — HIGH (ref 7–23)
BUN SERPL-MCNC: 45 MG/DL — HIGH (ref 7–23)
CALCIUM SERPL-MCNC: 8.5 MG/DL — SIGNIFICANT CHANGE UP (ref 8.4–10.5)
CALCIUM SERPL-MCNC: 8.6 MG/DL — SIGNIFICANT CHANGE UP (ref 8.4–10.5)
CALCIUM SERPL-MCNC: 8.6 MG/DL — SIGNIFICANT CHANGE UP (ref 8.4–10.5)
CALCIUM SERPL-MCNC: 8.7 MG/DL — SIGNIFICANT CHANGE UP (ref 8.4–10.5)
CHLORIDE SERPL-SCNC: 103 MMOL/L — SIGNIFICANT CHANGE UP (ref 96–108)
CHLORIDE SERPL-SCNC: 104 MMOL/L — SIGNIFICANT CHANGE UP (ref 96–108)
CHLORIDE SERPL-SCNC: 105 MMOL/L — SIGNIFICANT CHANGE UP (ref 96–108)
CHLORIDE SERPL-SCNC: 105 MMOL/L — SIGNIFICANT CHANGE UP (ref 96–108)
CO2 BLDV-SCNC: 30 MMOL/L — HIGH (ref 22–26)
CO2 SERPL-SCNC: 24 MMOL/L — SIGNIFICANT CHANGE UP (ref 22–31)
CO2 SERPL-SCNC: 25 MMOL/L — SIGNIFICANT CHANGE UP (ref 22–31)
CO2 SERPL-SCNC: 26 MMOL/L — SIGNIFICANT CHANGE UP (ref 22–31)
CO2 SERPL-SCNC: 27 MMOL/L — SIGNIFICANT CHANGE UP (ref 22–31)
CREAT SERPL-MCNC: 1.71 MG/DL — HIGH (ref 0.5–1.3)
CREAT SERPL-MCNC: 1.72 MG/DL — HIGH (ref 0.5–1.3)
CREAT SERPL-MCNC: 1.79 MG/DL — HIGH (ref 0.5–1.3)
CREAT SERPL-MCNC: 1.82 MG/DL — HIGH (ref 0.5–1.3)
CULTURE RESULTS: ABNORMAL
EGFR: 37 ML/MIN/1.73M2 — LOW
EGFR: 37 ML/MIN/1.73M2 — LOW
EGFR: 38 ML/MIN/1.73M2 — LOW
EGFR: 38 ML/MIN/1.73M2 — LOW
EGFR: 40 ML/MIN/1.73M2 — LOW
GAS PNL BLDA: SIGNIFICANT CHANGE UP
GAS PNL BLDV: SIGNIFICANT CHANGE UP
GLUCOSE SERPL-MCNC: 123 MG/DL — HIGH (ref 70–99)
GLUCOSE SERPL-MCNC: 173 MG/DL — HIGH (ref 70–99)
GLUCOSE SERPL-MCNC: 188 MG/DL — HIGH (ref 70–99)
GLUCOSE SERPL-MCNC: 90 MG/DL — SIGNIFICANT CHANGE UP (ref 70–99)
GRAM STN FLD: ABNORMAL
GRAM STN FLD: ABNORMAL
HCO3 BLDV-SCNC: 29 MMOL/L — SIGNIFICANT CHANGE UP (ref 22–29)
HCT VFR BLD CALC: 27.9 % — LOW (ref 39–50)
HCT VFR BLD CALC: 28.1 % — LOW (ref 39–50)
HCT VFR BLD CALC: 28.4 % — LOW (ref 39–50)
HCT VFR BLD CALC: 28.6 % — LOW (ref 39–50)
HGB BLD-MCNC: 9.1 G/DL — LOW (ref 13–17)
HGB BLD-MCNC: 9.5 G/DL — LOW (ref 13–17)
HGB BLD-MCNC: 9.5 G/DL — LOW (ref 13–17)
HGB BLD-MCNC: 9.6 G/DL — LOW (ref 13–17)
INR BLD: 0.92 — SIGNIFICANT CHANGE UP (ref 0.85–1.16)
INR BLD: 0.97 — SIGNIFICANT CHANGE UP (ref 0.85–1.16)
INR BLD: 0.98 — SIGNIFICANT CHANGE UP (ref 0.85–1.16)
INR BLD: 1.04 — SIGNIFICANT CHANGE UP (ref 0.85–1.16)
MAGNESIUM SERPL-MCNC: 1.9 MG/DL — SIGNIFICANT CHANGE UP (ref 1.6–2.6)
MAGNESIUM SERPL-MCNC: 2 MG/DL — SIGNIFICANT CHANGE UP (ref 1.6–2.6)
MCHC RBC-ENTMCNC: 28.4 PG — SIGNIFICANT CHANGE UP (ref 27–34)
MCHC RBC-ENTMCNC: 28.9 PG — SIGNIFICANT CHANGE UP (ref 27–34)
MCHC RBC-ENTMCNC: 29.4 PG — SIGNIFICANT CHANGE UP (ref 27–34)
MCHC RBC-ENTMCNC: 29.6 PG — SIGNIFICANT CHANGE UP (ref 27–34)
MCHC RBC-ENTMCNC: 32.6 G/DL — SIGNIFICANT CHANGE UP (ref 32–36)
MCHC RBC-ENTMCNC: 33.2 G/DL — SIGNIFICANT CHANGE UP (ref 32–36)
MCHC RBC-ENTMCNC: 33.8 G/DL — SIGNIFICANT CHANGE UP (ref 32–36)
MCHC RBC-ENTMCNC: 33.8 G/DL — SIGNIFICANT CHANGE UP (ref 32–36)
MCV RBC AUTO: 86.9 FL — SIGNIFICANT CHANGE UP (ref 80–100)
MCV RBC AUTO: 87 FL — SIGNIFICANT CHANGE UP (ref 80–100)
MCV RBC AUTO: 87.2 FL — SIGNIFICANT CHANGE UP (ref 80–100)
MCV RBC AUTO: 87.7 FL — SIGNIFICANT CHANGE UP (ref 80–100)
METHOD TYPE: SIGNIFICANT CHANGE UP
NRBC BLD AUTO-RTO: 0 /100 WBCS — SIGNIFICANT CHANGE UP (ref 0–0)
ORGANISM # SPEC MICROSCOPIC CNT: ABNORMAL
ORGANISM # SPEC MICROSCOPIC CNT: SIGNIFICANT CHANGE UP
PCO2 BLDV: 41 MMHG — LOW (ref 42–55)
PH BLDV: 7.46 — HIGH (ref 7.32–7.43)
PHOSPHATE SERPL-MCNC: 2.8 MG/DL — SIGNIFICANT CHANGE UP (ref 2.5–4.5)
PHOSPHATE SERPL-MCNC: 3 MG/DL — SIGNIFICANT CHANGE UP (ref 2.5–4.5)
PHOSPHATE SERPL-MCNC: 3.3 MG/DL — SIGNIFICANT CHANGE UP (ref 2.5–4.5)
PHOSPHATE SERPL-MCNC: 3.9 MG/DL — SIGNIFICANT CHANGE UP (ref 2.5–4.5)
PLATELET # BLD AUTO: 113 K/UL — LOW (ref 150–400)
PLATELET # BLD AUTO: 114 K/UL — LOW (ref 150–400)
PLATELET # BLD AUTO: 116 K/UL — LOW (ref 150–400)
PLATELET # BLD AUTO: 117 K/UL — LOW (ref 150–400)
PO2 BLDV: 33 MMHG — SIGNIFICANT CHANGE UP (ref 25–45)
POTASSIUM SERPL-MCNC: 3.9 MMOL/L — SIGNIFICANT CHANGE UP (ref 3.5–5.3)
POTASSIUM SERPL-MCNC: 4.1 MMOL/L — SIGNIFICANT CHANGE UP (ref 3.5–5.3)
POTASSIUM SERPL-MCNC: 4.2 MMOL/L — SIGNIFICANT CHANGE UP (ref 3.5–5.3)
POTASSIUM SERPL-MCNC: 4.5 MMOL/L — SIGNIFICANT CHANGE UP (ref 3.5–5.3)
POTASSIUM SERPL-SCNC: 3.9 MMOL/L — SIGNIFICANT CHANGE UP (ref 3.5–5.3)
POTASSIUM SERPL-SCNC: 4.1 MMOL/L — SIGNIFICANT CHANGE UP (ref 3.5–5.3)
POTASSIUM SERPL-SCNC: 4.2 MMOL/L — SIGNIFICANT CHANGE UP (ref 3.5–5.3)
POTASSIUM SERPL-SCNC: 4.5 MMOL/L — SIGNIFICANT CHANGE UP (ref 3.5–5.3)
PROT SERPL-MCNC: 5.3 G/DL — LOW (ref 6–8.3)
PROT SERPL-MCNC: 5.6 G/DL — LOW (ref 6–8.3)
PROTHROM AB SERPL-ACNC: 10.8 SEC — SIGNIFICANT CHANGE UP (ref 9.9–13.4)
PROTHROM AB SERPL-ACNC: 11.3 SEC — SIGNIFICANT CHANGE UP (ref 9.9–13.4)
PROTHROM AB SERPL-ACNC: 11.5 SEC — SIGNIFICANT CHANGE UP (ref 9.9–13.4)
PROTHROM AB SERPL-ACNC: 11.9 SEC — SIGNIFICANT CHANGE UP (ref 9.9–13.4)
RBC # BLD: 3.2 M/UL — LOW (ref 4.2–5.8)
RBC # BLD: 3.23 M/UL — LOW (ref 4.2–5.8)
RBC # BLD: 3.24 M/UL — LOW (ref 4.2–5.8)
RBC # BLD: 3.29 M/UL — LOW (ref 4.2–5.8)
RBC # FLD: 17 % — HIGH (ref 10.3–14.5)
RBC # FLD: 17.7 % — HIGH (ref 10.3–14.5)
RBC # FLD: 18.1 % — HIGH (ref 10.3–14.5)
RBC # FLD: 18.4 % — HIGH (ref 10.3–14.5)
RH IG SCN BLD-IMP: POSITIVE — SIGNIFICANT CHANGE UP
SAO2 % BLDV: 52.1 % — LOW (ref 67–88)
SODIUM SERPL-SCNC: 141 MMOL/L — SIGNIFICANT CHANGE UP (ref 135–145)
SODIUM SERPL-SCNC: 142 MMOL/L — SIGNIFICANT CHANGE UP (ref 135–145)
SODIUM SERPL-SCNC: 142 MMOL/L — SIGNIFICANT CHANGE UP (ref 135–145)
SODIUM SERPL-SCNC: 143 MMOL/L — SIGNIFICANT CHANGE UP (ref 135–145)
SPECIMEN SOURCE: SIGNIFICANT CHANGE UP
WBC # BLD: 3.8 K/UL — SIGNIFICANT CHANGE UP (ref 3.8–10.5)
WBC # BLD: 4.69 K/UL — SIGNIFICANT CHANGE UP (ref 3.8–10.5)
WBC # BLD: 4.74 K/UL — SIGNIFICANT CHANGE UP (ref 3.8–10.5)
WBC # BLD: 5.13 K/UL — SIGNIFICANT CHANGE UP (ref 3.8–10.5)
WBC # FLD AUTO: 3.8 K/UL — SIGNIFICANT CHANGE UP (ref 3.8–10.5)
WBC # FLD AUTO: 4.69 K/UL — SIGNIFICANT CHANGE UP (ref 3.8–10.5)
WBC # FLD AUTO: 4.74 K/UL — SIGNIFICANT CHANGE UP (ref 3.8–10.5)
WBC # FLD AUTO: 5.13 K/UL — SIGNIFICANT CHANGE UP (ref 3.8–10.5)

## 2025-05-12 PROCEDURE — 99233 SBSQ HOSP IP/OBS HIGH 50: CPT

## 2025-05-12 PROCEDURE — 71045 X-RAY EXAM CHEST 1 VIEW: CPT | Mod: 26

## 2025-05-12 PROCEDURE — 99291 CRITICAL CARE FIRST HOUR: CPT | Mod: 25

## 2025-05-12 PROCEDURE — 99232 SBSQ HOSP IP/OBS MODERATE 35: CPT

## 2025-05-12 PROCEDURE — 76604 US EXAM CHEST: CPT | Mod: 26

## 2025-05-12 PROCEDURE — 99292 CRITICAL CARE ADDL 30 MIN: CPT | Mod: 25

## 2025-05-12 PROCEDURE — 32557 INSERT CATH PLEURA W/ IMAGE: CPT | Mod: RT

## 2025-05-12 PROCEDURE — 71045 X-RAY EXAM CHEST 1 VIEW: CPT | Mod: 26,77

## 2025-05-12 PROCEDURE — G0545: CPT

## 2025-05-12 RX ADMIN — Medication 3 MILLILITER(S): at 21:47

## 2025-05-12 RX ADMIN — Medication 15 MILLILITER(S): at 05:40

## 2025-05-12 RX ADMIN — Medication 25 MICROGRAM(S): at 13:25

## 2025-05-12 RX ADMIN — Medication 3 MILLILITER(S): at 06:18

## 2025-05-12 RX ADMIN — Medication 81 MILLIGRAM(S): at 11:08

## 2025-05-12 RX ADMIN — Medication 25 MICROGRAM(S): at 13:45

## 2025-05-12 RX ADMIN — CEFTRIAXONE 100 MILLIGRAM(S): 500 INJECTION, POWDER, FOR SOLUTION INTRAMUSCULAR; INTRAVENOUS at 17:51

## 2025-05-12 RX ADMIN — DOBUTAMINE 11.2 MICROGRAM(S)/KG/MIN: 250 INJECTION INTRAVENOUS at 08:57

## 2025-05-12 RX ADMIN — CEFTRIAXONE 100 MILLIGRAM(S): 500 INJECTION, POWDER, FOR SOLUTION INTRAMUSCULAR; INTRAVENOUS at 05:41

## 2025-05-12 RX ADMIN — HEPARIN SODIUM 5000 UNIT(S): 1000 INJECTION INTRAVENOUS; SUBCUTANEOUS at 06:40

## 2025-05-12 RX ADMIN — AMPICILLIN SODIUM 216 GRAM(S): 1 INJECTION, POWDER, FOR SOLUTION INTRAMUSCULAR; INTRAVENOUS at 00:52

## 2025-05-12 RX ADMIN — HEPARIN SODIUM 5000 UNIT(S): 1000 INJECTION INTRAVENOUS; SUBCUTANEOUS at 14:06

## 2025-05-12 RX ADMIN — HEPARIN SODIUM 5000 UNIT(S): 1000 INJECTION INTRAVENOUS; SUBCUTANEOUS at 22:11

## 2025-05-12 RX ADMIN — AMPICILLIN SODIUM 216 GRAM(S): 1 INJECTION, POWDER, FOR SOLUTION INTRAMUSCULAR; INTRAVENOUS at 06:42

## 2025-05-12 RX ADMIN — PROPOFOL 13.4 MICROGRAM(S)/KG/MIN: 10 INJECTION, EMULSION INTRAVENOUS at 11:29

## 2025-05-12 RX ADMIN — AMPICILLIN SODIUM 216 GRAM(S): 1 INJECTION, POWDER, FOR SOLUTION INTRAMUSCULAR; INTRAVENOUS at 11:08

## 2025-05-12 RX ADMIN — DEXMEDETOMIDINE HYDROCHLORIDE IN SODIUM CHLORIDE 5.6 MICROGRAM(S)/KG/HR: 4 INJECTION INTRAVENOUS at 10:33

## 2025-05-12 RX ADMIN — Medication 5 UNIT(S)/HR: at 22:59

## 2025-05-12 RX ADMIN — Medication 3 MILLILITER(S): at 13:31

## 2025-05-12 RX ADMIN — Medication 15 MILLILITER(S): at 17:51

## 2025-05-12 RX ADMIN — Medication 5 UNIT(S)/HR: at 08:58

## 2025-05-12 RX ADMIN — Medication 100 MILLIEQUIVALENT(S): at 08:15

## 2025-05-12 RX ADMIN — AMPICILLIN SODIUM 216 GRAM(S): 1 INJECTION, POWDER, FOR SOLUTION INTRAMUSCULAR; INTRAVENOUS at 17:46

## 2025-05-12 NOTE — PROGRESS NOTE ADULT - SUBJECTIVE AND OBJECTIVE BOX
INTERVAL HPI/OVERNIGHT EVENTS:    CONSTITUTIONAL:  No fever, chills, night sweats  EYES:  No photophobia or visual changes  CARDIOVASCULAR:  No chest pain  RESPIRATORY:  No cough, wheezing, or SOB   GASTROINTESTINAL:  No nausea, vomiting, diarrhea, constipation, or abdominal pain  GENITOURINARY:  No frequency, urgency, dysuria or hematuria  NEUROLOGIC:  No headache, lightheadedness      ANTIBIOTICS/RELEVANT:          Vital Signs Last 24 Hrs  T(C): 37.6 (12 May 2025 22:14), Max: 37.6 (12 May 2025 22:14)  T(F): 99.7 (12 May 2025 22:14), Max: 99.7 (12 May 2025 22:14)  HR: 96 (12 May 2025 23:00) (62 - 107)  BP: 96/47 (12 May 2025 05:00) (89/45 - 108/53)  BP(mean): 68 (12 May 2025 05:00) (64 - 77)  RR: 20 (12 May 2025 23:00) (16 - 30)  SpO2: 100% (12 May 2025 23:00) (94% - 100%)    Parameters below as of 12 May 2025 23:00  Patient On (Oxygen Delivery Method): ventilator    O2 Concentration (%): 40    PHYSICAL EXAM:  Constitutional:  Alert  Eyes:  Sclerae anicteric, conjunctivae clear, PERRL  Ear/Nose/Throat:  No nasal exudate or sinus tenderness;  No buccal mucosal lesions, no pharyngeal erythema or exudate	  Neck:  Supple, no adenopathy  Respiratory:  Clear bilaterally  Cardiovascular:  RRR, S1S2, no murmur appreciated  Gastrointestinal:  Symmetric, normoactive BS, soft, NT, no masses, guarding or rebound.  No HSM  Extremities:  No edema      LABS:                        9.1    3.80  )-----------( 113      ( 12 May 2025 18:17 )             27.9         05-12    143  |  105  |  44[H]  ----------------------------<  173[H]  4.2   |  26  |  1.71[H]    Ca    8.5      12 May 2025 18:17  Phos  3.9     05-12  Mg     2.0     05-12    TPro  5.3[L]  /  Alb  2.5[L]  /  TBili  0.4  /  DBili  x   /  AST  75[H]  /  ALT  116[H]  /  AlkPhos  77  05-12      Urinalysis Basic - ( 12 May 2025 18:17 )    Color: x / Appearance: x / SG: x / pH: x  Gluc: 173 mg/dL / Ketone: x  / Bili: x / Urobili: x   Blood: x / Protein: x / Nitrite: x   Leuk Esterase: x / RBC: x / WBC x   Sq Epi: x / Non Sq Epi: x / Bacteria: x        MICROBIOLOGY:        RADIOLOGY & ADDITIONAL STUDIES: INTERVAL HPI/OVERNIGHT EVENTS: Intubated    ROS unobtainable d/t current condition      ANTIBIOTICS/RELEVANT:    ampicillin  IVPB   216 mL/Hr IV Intermittent (05-10-25 @ 14:23)    ampicillin  IVPB   216 mL/Hr IV Intermittent (05-12-25 @ 17:46)   216 mL/Hr IV Intermittent (05-12-25 @ 11:08)   216 mL/Hr IV Intermittent (05-12-25 @ 06:42)   216 mL/Hr IV Intermittent (05-12-25 @ 00:52)   216 mL/Hr IV Intermittent (05-11-25 @ 18:10)   216 mL/Hr IV Intermittent (05-11-25 @ 11:12)   216 mL/Hr IV Intermittent (05-11-25 @ 06:23)   216 mL/Hr IV Intermittent (05-10-25 @ 23:50)   216 mL/Hr IV Intermittent (05-10-25 @ 18:13)    azithromycin  IVPB   255 mL/Hr IV Intermittent (05-09-25 @ 20:46)    cefTRIAXone   IVPB   100 mL/Hr IV Intermittent (05-12-25 @ 17:51)   100 mL/Hr IV Intermittent (05-12-25 @ 05:41)   100 mL/Hr IV Intermittent (05-11-25 @ 17:15)   100 mL/Hr IV Intermittent (05-11-25 @ 06:23)   100 mL/Hr IV Intermittent (05-10-25 @ 19:20)    piperacillin/tazobactam IVPB.   200 mL/Hr IV Intermittent (05-09-25 @ 18:59)    piperacillin/tazobactam IVPB.-   25 mL/Hr IV Intermittent (05-09-25 @ 23:15)    piperacillin/tazobactam IVPB..   25 mL/Hr IV Intermittent (05-10-25 @ 05:14)    vancomycin  IVPB   300 mL/Hr IV Intermittent (05-09-25 @ 21:46)            Vital Signs Last 24 Hrs  T(C): 37.6 (12 May 2025 22:14), Max: 37.6 (12 May 2025 22:14)  T(F): 99.7 (12 May 2025 22:14), Max: 99.7 (12 May 2025 22:14)  HR: 96 (12 May 2025 23:00) (62 - 107)  BP: 96/47 (12 May 2025 05:00) (89/45 - 108/53)  BP(mean): 68 (12 May 2025 05:00) (64 - 77)  RR: 20 (12 May 2025 23:00) (16 - 30)  SpO2: 100% (12 May 2025 23:00) (94% - 100%)    Parameters below as of 12 May 2025 23:00  Patient On (Oxygen Delivery Method): ventilator    O2 Concentration (%): 40    PHYSICAL EXAM:  Constitutional:  Alert  Eyes:  Sclerae anicteric, conjunctivae clear, PERRL  Ear/Nose/Throat:  No nasal exudate or sinus tenderness  Neck:  Supple, no adenopathy  Respiratory:  mechanical breath sounds  Cardiovascular:  RRR, S1S2, no murmur appreciated  Gastrointestinal:  Symmetric, normoactive BS, soft, NT  Extremities:  No edema  Intubated, elderly appearing male, NAD      LABS:                        9.1    3.80  )-----------( 113      ( 12 May 2025 18:17 )             27.9         05-12    143  |  105  |  44[H]  ----------------------------<  173[H]  4.2   |  26  |  1.71[H]    Ca    8.5      12 May 2025 18:17  Phos  3.9     05-12  Mg     2.0     05-12    TPro  5.3[L]  /  Alb  2.5[L]  /  TBili  0.4  /  DBili  x   /  AST  75[H]  /  ALT  116[H]  /  AlkPhos  77  05-12      Urinalysis Basic - ( 12 May 2025 18:17 )    Color: x / Appearance: x / SG: x / pH: x  Gluc: 173 mg/dL / Ketone: x  / Bili: x / Urobili: x   Blood: x / Protein: x / Nitrite: x   Leuk Esterase: x / RBC: x / WBC x   Sq Epi: x / Non Sq Epi: x / Bacteria: x        MICROBIOLOGY:    Culture - Blood (collected 11 May 2025 07:40)  Source: Blood Blood-Peripheral  Preliminary Report (12 May 2025 13:02):    No growth at 24 hours    Culture - Blood (collected 10 May 2025 18:10)  Source: Blood Blood-Peripheral  Gram Stain (12 May 2025 07:37):    Growth in anaerobic bottle: Gram Positive Cocci in Pairs and Chains    Growth in aerobic bottle: Gram Positive Cocci in Pairs and Chains  Preliminary Report (12 May 2025 21:32):    Growth in aerobic and anaerobic bottles: Enterococcus faecalis    See previous culture [id number]    Culture - Bronchial (collected 09 May 2025 19:46)  Source: Bronchial Bronchial Lavage  Gram Stain (10 May 2025 12:20):    Rare polymorphonuclear leukocytes per low power field    Rare Squamous epithelial cells per low power field    No organisms seen per oil power field  Final Report (11 May 2025 14:13):    Commensal usha consistent with body site    Urinalysis with Rflx Culture (collected 09 May 2025 18:19)    Culture - Blood (collected 09 May 2025 17:21)  Source: Blood Blood-Peripheral  Gram Stain (11 May 2025 00:58):    Growth in anaerobic bottle: Gram Positive Cocci in Pairs and Chains    Growth in aerobic bottle: Gram Positive Cocci in Pairs and Chains  Final Report (12 May 2025 12:30):    Growth in aerobic and anaerobic bottles: Enterococcus faecalis  Organism: Enterococcus faecalis (12 May 2025 12:30)  Organism: Enterococcus faecalis (12 May 2025 12:30)    Culture - Blood (collected 09 May 2025 17:21)  Source: Blood Blood-Peripheral  Gram Stain (10 May 2025 21:38):    Growth in aerobic and anaerobic bottles: Gram Positive Cocci in Pairs and    Chains  Final Report (11 May 2025 13:51):    Growth in aerobic and anaerobic bottles: Enterococcus faecalis    See previous culture [id number]      RADIOLOGY & ADDITIONAL STUDIES:

## 2025-05-12 NOTE — PROGRESS NOTE ADULT - ASSESSMENT
79M w/ PMHx of DM, HTN, HLD, hx of hospitalization in [place] (1/2025, 2/2025) for sepsis 2/2 acute   cholecystitis (s/p cholecystectomy), c/b postsurgical hematoma, b/l pleural effusions s/p thoracentesis of R pleural effusion on 3/3/25 at [facility] [pleural fluid and BCx neg at the time].  Pt son is an ED RN at [place] and tells me that pt had Enterococcal bacteremia while he was hospitalized in [place]. Source felt to be GI/cholecystitis at the time. Unsure of what Abx were given back then and for how long.   He subsequently went to rehab and eventually returned to [facility]. Was taken to [facility] for weakness and dyspnea x 2 wks with significant worsening 2d pta.  There he denied fever, chills, chest pain, nausea, vomiting, abdominal pain, diarrhea, dysuria; reported decreased   urination. There, he was tachypneic with O2 sats in the 80s on RA and low 90s on 4L O2 via NC. CXR with fluid overload vs pneumonia. WBC WNL with N predom, UA neg, UCx w <10k UGF.   CT c/a/p showed Interval development of multifocal central consolidative opacities throughout the lungs, particularly the upper lobes and left lower lobe, R pleural effusion was noted to have dec in size, new small L pleural effusion (complex per HIE crit care note), significant interval decrease in size of the subcapsular hematoma along the inferior aspect of the liver.  Patient reportedly met sepsis criteria and was admitted to the ICU for sepsis and was in resp distress. Was given Vanc/Piptazo/Azithro + stress steroids. Was on bipap.   5/8 BCx + E fecalis (S- Amp, Vanco).   5/9 BCx + (4/4) w E fecalis.   5/10 BCx + E fecalis  5/11 BCx spec recvd  Echo with aortic valve vegetations, EF 40-45%  Xferred to [facility] for possible surgical mgmt of AoV endocarditis and critical care, yesterday was on 3 pressors, dobutamine.   Afebrile today, on norepi, off phenylephrine and vasopressin. Lasix diuresis in progress.   #Enterococcal bacteremia and AoV (NV) endocarditis  - Please send daily BCx until neg for 72 hr  - F/u surveillance BCx  - SHELLY when able  - Cont IV Ampicillin 2 gm q6h (renally adjusted)  - Cont IV Ceftriaxone 2gm q12h   - F/u CTS recs  ID Team 1 will follow.

## 2025-05-12 NOTE — PROCEDURE NOTE - NSPOSTPRCRAD_GEN_A_CORE
post-procedure radiography performed
central line located in the superior vena cava/no pneumothorax/post-procedure radiography performed
CXR ordered
central line located in the superior vena cava/no pneumothorax/post-procedure radiography performed

## 2025-05-12 NOTE — PROCEDURE NOTE - NSPROCNAME_GEN_A_CORE
Chest Tube
Arterial Puncture/Cannulation
Bronchoscopy
Central Line Insertion
Gastric Intubation/Gastric Lavage
Arterial Puncture/Cannulation
Tracheal Intubation
Chest Tube
Central Line Insertion

## 2025-05-12 NOTE — PROCEDURE NOTE - NSPROCDETAILS_GEN_ALL_CORE
guidewire recovered/lumen(s) aspirated and flushed/sterile dressing applied/sterile technique, catheter placed/ultrasound guidance with use of sterile gel and probe cove
patient pre-oxygenated, tube inserted, placement confirmed
location identified, draped/prepped, sterile technique used, needle inserted/introduced/positive blood return obtained via catheter
placement confirmed by auscultation
Seldinger technique

## 2025-05-12 NOTE — PROGRESS NOTE ADULT - SUBJECTIVE AND OBJECTIVE BOX
INTERVAL HPI/OVERNIGHT EVENTS:    Ampicillin/Ceftriaxone    Infective Endocarditis - native Aortic valve   Septic shock     78yo male Hx HTN, HLD, DM, recent cholecystectomy w/complicating subhepatic hematoma/sepsis (in [place] - 2025) and hospitalization for resp distress thereafter - thoracentesis performed presented to [facility]  w/sxs SOB and weakness x 2 weeks    LANETTE/elevated BNP/Trop   CT Chest: interval development of multifocal central consolidative opacities through out lungs ; dec size Rt pleural effusion and dec size subcapsular hematoma along inferior aspect of liver     Abx : Vanc/Zosyn/levaquin later Azithromycin; Steroids started.  HFNC after Sa02 88% on 7L and admitted to ICU at [place]   Afebrile ; hypotension reported fluid responsive   RPP negative   Cardiology consulted - elevated trop   Pulm consulted     Blood Cx  and 5/10 - Enterococcus faecalis   ECHO: Newly reduced EF 40-45% with thickening of AV leaflets concerning for vegetation.     Transferred to [facility]  - arrived to [facility] on high dose pressor - Levophed 0.1/Vaso 0.06     intubated on arrival - lines placed ; Alexy added/lasix & dobutamine qtt initiated     Renal and Infectious Diseases consulted    Abx transitioned to CTX/Amp for Enterococcus  Lasix infusion started     [place] Placed 5/10 - Initial CI 1.4 -  inc to 5 ; most recent CI now 2.5    Enteral feeds initiated     Overall pressor requirement improved     ICU Vital Signs Last 24 Hrs  T(C): 36.8 (12 May 2025 05:20), Max: 36.8 (12 May 2025 05:20)  T(F): 98.2 (12 May 2025 05:20), Max: 98.2 (12 May 2025 05:20)  HR: 98 (12 May 2025 07:00) (62 - 98) sinus   BP: 96/47 (12 May 2025 05:00) (89/45 - 121/54)  BP(mean): 68 (12 May 2025 05:00) (64 - 85)  ABP: 119/35 (12 May 2025 07:00) (84/46 - 129/45)  ABP(mean): 60 (12 May 2025 07:00) (52 - 85)  RR: 24 (12 May 2025 07:00) (15 - 32)  SpO2: 97% (12 May 2025 07:00) (94% - 100%) Fi02 40%    Qtts:   Dobutamine 5  Lasix 5 mg/hour (dec fro 10mg/hour at [facility])  Levophed 0.03  Vasopressin 0.02  Propofol 20  Insulin   Precedex 0.3    I&O's Summary    11 May 2025 07:01  -  12 May 2025 07:00  --------------------------------------------------------  IN: 4433.6 mL / OUT: 4635 mL / NET: -201.4 mL    Mode: AC/ CMV (Assist Control/ Continuous Mandatory Ventilation)  RR (machine): 12  TV (machine): 550  FiO2: 40  PEEP: 6  ITime: 1  MAP: 16  PIP: 28    Physical Exam    Heart  Lungs  Abd  Ext  Chest  Neuro  Skin    LABS:                        9.5    5.13  )-----------( 116      ( 12 May 2025 02:14 )             28.1     05-12    142  |  104  |  41[H]  ----------------------------<  90  3.9   |  25  |  1.82[H]    Ca    8.6      12 May 2025 02:14  Phos  3.0     05-12  Mg     2.0     05-12    TPro  5.6[L]  /  Alb  2.8[L]  /  TBili  0.4  /  DBili  x   /  AST  112[H]  /  ALT  155[H]  /  AlkPhos  81  05-12    PT/INR - ( 12 May 2025 02:14 )   PT: 11.9 sec;   INR: 1.04     PTT - ( 12 May 2025 02:14 )  PTT:27.3 sec    ABG - ( 12 May 2025 02:15 )  pH, Arterial: 7.50  pH, Blood: x     /  pCO2: 30    /  pO2: 94    / HCO3: 23    / Base Excess: 1.1   /  SaO2: 98.3      RADIOLOGY & ADDITIONAL STUDIES:    ECHO 3/3: EF 65%'; no regional wall motion abnormalities , mild AR    I have spent/provided stated minutes of critical care time to this patient:  INTERVAL HPI/OVERNIGHT EVENTS:    Ampicillin/Ceftriaxone    Infective Endocarditis - native Aortic valve   Septic shock     78yo male Hx HTN, HLD, DM, recent cholecystectomy w/complicating subhepatic hematoma/sepsis (in [place] - 2025) and hospitalization for resp distress thereafter - thoracentesis performed presented to [facility]  w/sxs SOB and weakness x 2 weeks    LANETTE/elevated BNP/Trop   CT Chest: interval development of multifocal central consolidative opacities through out lungs ; dec size Rt pleural effusion and dec size subcapsular hematoma along inferior aspect of liver     Abx : Vanc/Zosyn/levaquin later Azithromycin; Steroids started.  HFNC after Sa02 88% on 7L and admitted to ICU at [place]   Afebrile ; hypotension reported fluid responsive   RPP negative   Cardiology consulted - elevated trop   Pulm consulted     Blood Cx  and 5/10 - Enterococcus faecalis   ECHO: Newly reduced EF 40-45% with thickening of AV leaflets concerning for vegetation.     Transferred to [facility]  - arrived to [facility] on high dose pressor - Levophed 0.1/Vaso 0.06     intubated on arrival - lines placed ; Alexy added/lasix & dobutamine qtt initiated     Renal and Infectious Diseases consulted    Abx transitioned to CTX/Amp for Enterococcus  Lasix infusion started     [place] Placed 5/10 - Initial CI 1.4 -  inc to 5 ; most recent CI now 2.5    Enteral feeds initiated   1 U pRBC given overnight with appropriate rise in Hg and dec pressors    Overall pressor requirement improved     ICU Vital Signs Last 24 Hrs  T(C): 36.8 (12 May 2025 05:20), Max: 36.8 (12 May 2025 05:20)  T(F): 98.2 (12 May 2025 05:20), Max: 98.2 (12 May 2025 05:20)  HR: 98 (12 May 2025 07:00) (62 - 98) sinus   BP: 96/47 (12 May 2025 05:00) (89/45 - 121/54)  BP(mean): 68 (12 May 2025 05:00) (64 - 85)  ABP: 119/35 (12 May 2025 07:00) (84/46 - 129/45)  ABP(mean): 60 (12 May 2025 07:00) (52 - 85)  RR: 24 (12 May 2025 07:00) (15 - 32)  SpO2: 97% (12 May 2025 07:00) (94% - 100%) Fi02 40%    Qtts:   Dobutamine 5  Lasix 5 mg/hour (dec from 10mg/hour at [facility])  Levophed 0.03  Vasopressin now off   Propofol 20  Insulin   Precedex 0.3    I&O's Summary    11 May 2025 07:01  -  12 May 2025 07:00  --------------------------------------------------------  IN: 4433.6 mL / OUT: 4635 mL / NET: -201.4 mL    Mode: AC/ CMV (Assist Control/ Continuous Mandatory Ventilation)  RR (machine): 12  TV (machine): 550  FiO2: 40  PEEP: 6  ITime: 1  MAP: 16  PIP: 28    Physical Exam    Heart - regular (-)rub/gallop  Lungs - CTA anterior - no rub/gallop  Abd - (+)BS soft NTND - no r/r/g  Ext -warm to touch; 1+ peripheral edema LE   Neuro - opens eyes to voice ; weak but able to move hands/wiggle toes to command  Skin - no rash ; un stageable tissue injury sacrum     LABS:                        9.5    5.13  )-----------( 116      ( 12 May 2025 02:14 )             28.1     05-12    142  |  104  |  41[H]  ----------------------------<  90  3.9   |  25  |  1.82[H]    Ca    8.6      12 May 2025 02:14  Phos  3.0     05-12  Mg     2.0     05-12    TPro  5.6[L]  /  Alb  2.8[L]  /  TBili  0.4  /  DBili  x   /  AST  112[H]  /  ALT  155[H]  /  AlkPhos  81  05-12    PT/INR - ( 12 May 2025 02:14 )   PT: 11.9 sec;   INR: 1.04     PTT - ( 12 May 2025 02:14 )  PTT:27.3 sec    ABG - ( 12 May 2025 02:15 )  pH, Arterial: 7.50  pH, Blood: x     /  pCO2: 30    /  pO2: 94    / HCO3: 23    / Base Excess: 1.1   /  SaO2: 98.3      RADIOLOGY & ADDITIONAL STUDIES:  LA 1.1 Mix Last 52    Blood Cx 5/10 - Enterococcus faecalis   Bronch Cx  - normal usha  Blood Cx  - Enterococcus faecalis   RUQ sono 5/10 - Complex perihepatic collection/hematoma decreased (6.2 x 1.7 x 4.0cm)       ECHO 3/3: EF 65%'; no regional wall motion abnormalities , mild AR    Patient with recent gallbladder disease with reported sepsis (unclear if bacteremic at that time - [place]) and progressive SOB sxs s/p thoracentesis presenting with worsening SOB/Hypoxemia - resp failure requiring intubation -  Septic shock/cardiogenic shock with Enterococcus bacteremia and AV vegetation         I have spent/provided stated minutes of critical care time to this patient:  INTERVAL HPI/OVERNIGHT EVENTS:    Ampicillin/Ceftriaxone    Infective Endocarditis - native Aortic valve   Septic shock     78yo male Hx HTN, HLD, DM, recent cholecystectomy w/complicating subhepatic hematoma/sepsis (in [place] - 2025) and hospitalization for resp distress thereafter - thoracentesis performed presented to [facility]  w/sxs SOB and weakness x 2 weeks    LANETTE/elevated BNP/Trop   CT Chest: interval development of multifocal central consolidative opacities through out lungs ; dec size Rt pleural effusion and dec size subcapsular hematoma along inferior aspect of liver     Abx : Vanc/Zosyn/levaquin later Azithromycin; Steroids started.  HFNC after Sa02 88% on 7L and admitted to ICU at [place]   Afebrile ; hypotension reported fluid responsive   RPP negative   Cardiology consulted - elevated trop   Pulm consulted     Blood Cx  and 5/10 - Enterococcus faecalis   ECHO: Newly reduced EF 40-45% with thickening of AV leaflets concerning for vegetation.     Transferred to [facility]  - arrived to [facility] on high dose pressor - Levophed 0.1/Vaso 0.06     intubated on arrival - lines placed ; Alexy added/Lasix & Dobutamine qtt initiated     Renal and Infectious Diseases consulted    Abx transitioned to CTX/Amp for Enterococcus  Lasix infusion started     [place] Placed 5/10 - Initial CI 1.4 -  inc to 5 ; most recent CI now 2.5    Enteral feeds initiated   1 U pRBC given overnight with appropriate rise in Hg and dec pressors    Overall pressor requirement improved     ICU Vital Signs Last 24 Hrs  T(C): 36.8 (12 May 2025 05:20), Max: 36.8 (12 May 2025 05:20)  T(F): 98.2 (12 May 2025 05:20), Max: 98.2 (12 May 2025 05:20)  HR: 98 (12 May 2025 07:00) (62 - 98) sinus   BP: 96/47 (12 May 2025 05:00) (89/45 - 121/54)  BP(mean): 68 (12 May 2025 05:00) (64 - 85)  ABP: 119/35 (12 May 2025 07:00) (84/46 - 129/45)  ABP(mean): 60 (12 May 2025 07:00) (52 - 85)  RR: 24 (12 May 2025 07:00) (15 - 32)  SpO2: 97% (12 May 2025 07:00) (94% - 100%) Fi02 40%    Qtts:   Dobutamine 5  Lasix 5 mg/hour (dec from 10mg/hour at [facility])  Levophed 0.03  Vasopressin now off   Propofol 20  Insulin   Precedex 0.3    I&O's Summary    11 May 2025 07:01  -  12 May 2025 07:00  --------------------------------------------------------  IN: 4433.6 mL / OUT: 4635 mL / NET: -201.4 mL    Mode: AC/ CMV (Assist Control/ Continuous Mandatory Ventilation)  RR (machine): 12  TV (machine): 550  FiO2: 40  PEEP: 6  ITime: 1  MAP: 16  PIP: 28    Physical Exam    Heart - regular (-)rub/gallop  Lungs - CTA anterior - no rub/gallop  Abd - (+)BS soft NTND - no r/r/g  Ext -warm to touch; 1+ peripheral edema LE   Neuro - opens eyes to voice ; weak but able to move hands/wiggle toes to command  Skin - no rash ; un stageable tissue injury sacrum     LABS:                        9.5    5.13  )-----------( 116      ( 12 May 2025 02:14 )             28.1     05-12    142  |  104  |  41[H]  ----------------------------<  90  3.9   |  25  |  1.82[H]    Ca    8.6      12 May 2025 02:14  Phos  3.0     05-12  Mg     2.0     05-12    TPro  5.6[L]  /  Alb  2.8[L]  /  TBili  0.4  /  DBili  x   /  AST  112[H]  /  ALT  155[H]  /  AlkPhos  81  05-12    PT/INR - ( 12 May 2025 02:14 )   PT: 11.9 sec;   INR: 1.04     PTT - ( 12 May 2025 02:14 )  PTT:27.3 sec    ABG - ( 12 May 2025 02:15 )  pH, Arterial: 7.50  pH, Blood: x     /  pCO2: 30    /  pO2: 94    / HCO3: 23    / Base Excess: 1.1   /  SaO2: 98.3      RADIOLOGY & ADDITIONAL STUDIES:  LA 1.1 Mix Last 52    Blood Cx 5/10 - Enterococcus faecalis   Bronch Cx  - normal usha  Blood Cx  - Enterococcus faecalis   RUQ sono 5/10 - Complex perihepatic collection/hematoma decreased (6.2 x 1.7 x 4.0cm)       ECHO 3/3: EF 65%'; no regional wall motion abnormalities , mild AR    Patient with recent gallbladder disease with reported sepsis (unclear if bacteremic at that time - [place]) and progressive SOB sxs s/p thoracentesis presenting with worsening SOB/Hypoxemia - resp failure requiring intubation -  Septic shock/cardiogenic shock with Enterococcus bacteremia and AV vegetation     1. CV  cont inotropic and pressor support as needed to maintain  and CI >2  sinus rhythm   to d/w CTS need for SHELLY as pressor requirements have improved   unable to visualize ECHO from [place] at this time   serial labs and CE  obtain daily blood cultures - hope to obtain control of bacteremia   ASA    2. Pulm   hypoxemic resp failure   suspect septic emboli on CT  marked improvement in Fi02 requirements since arrival to [facility] - now 40% with good p02 115  transition to IMV to avoid fatigue  to discuss with CTS potential timing of ongoing testing to assess candidacy and timing of possible OR     3. Renal  Cr 1.82 today (Cr 2.07 on 5/10)  lasix infusion as needed - likely further dec infusion   excellent diuresis to date   monitor CVP  all lyte supplementation to be given via enteral route   avoid nephrotoxins as much as able   Renal following     4. Endocrine   insulin infusion as needed to maintain glycemic control   HgA1c 8.4  steroids given at [place] likely exacerbated already poorly controlled DM    5. ID  Enterococcus bacteremia   serial blood cultures (daily) until control demonstrated   Ampicillin and Ceftriaxone ongoing   ID following     will need CT imaging to assess for mycotic aneurysms & Cath   wound care consult for noted skin breakdown noted on arrival - freq turning   nutrition ongoing - at goal at this time     d/w patient/staff and CTS    I have spent/provided stated minutes of critical care time to this patient: 2 hour

## 2025-05-12 NOTE — PROCEDURE NOTE - NSINFORMCONSENT_GEN_A_CORE
Benefits, risks, and possible complications of procedure explained to patient/caregiver who verbalized understanding and gave written consent.
Benefits, risks, and possible complications of procedure explained to patient/caregiver who verbalized understanding and gave written consent.
This was an emergent procedure.
Benefits, risks, and possible complications of procedure explained to patient/caregiver who verbalized understanding and gave written consent.

## 2025-05-12 NOTE — PROCEDURE NOTE - NSINDICATIONS_GEN_A_CORE
critical patient/monitoring purposes
pleural effusion
feeds
arterial puncture to obtain ABG's/critical patient
emergency venous access/venous access/volume resuscitation
pleural effusion
hemodynamic monitoring
critical patient/respiratory distress/respiratory failure

## 2025-05-12 NOTE — PROCEDURE NOTE - PROCEDURE DATE TIME, MLM
11-May-2025 17:02
12-May-2025 15:47
10-May-2025 14:00
09-May-2025 18:30
09-May-2025 17:42
09-May-2025 18:45

## 2025-05-12 NOTE — PROGRESS NOTE ADULT - SUBJECTIVE AND OBJECTIVE BOX
Seen at bedside. Intubated, on pressor and inotrope. On Lasix gtt yesterday with UOP ~4.6L recorded for net negative 200cc. S/p chest tube placement.    Allergies:  No Known Allergies      Hospital Medications:   MEDICATIONS  (STANDING):  ampicillin  IVPB      ampicillin  IVPB 2 Gram(s) IV Intermittent every 6 hours  aspirin  chewable 81 milliGRAM(s) Oral daily  cefTRIAXone   IVPB 2000 milliGRAM(s) IV Intermittent every 12 hours  chlorhexidine 0.12% Liquid 15 milliLiter(s) Oral Mucosa every 12 hours  dexMEDEtomidine Infusion 0.3 MICROgram(s)/kG/Hr (5.6 mL/Hr) IV Continuous <Continuous>  dextrose 5%. 1000 milliLiter(s) (50 mL/Hr) IV Continuous <Continuous>  dextrose 5%. 1000 milliLiter(s) (100 mL/Hr) IV Continuous <Continuous>  dextrose 50% Injectable 50 milliLiter(s) IV Push every 15 minutes  DOBUTamine Infusion 5 MICROgram(s)/kG/Min (11.2 mL/Hr) IV Continuous <Continuous>  furosemide Infusion 5 mG/Hr (2.5 mL/Hr) IV Continuous <Continuous>  glucagon  Injectable 1 milliGRAM(s) IntraMuscular once  heparin   Injectable 5000 Unit(s) SubCutaneous every 8 hours  insulin regular Infusion 5 Unit(s)/Hr (5 mL/Hr) IV Continuous <Continuous>  norepinephrine Infusion 0.05 MICROgram(s)/kG/Min (3.5 mL/Hr) IV Continuous <Continuous>  phenylephrine    Infusion 0.5 MICROgram(s)/kG/Min (7 mL/Hr) IV Continuous <Continuous>  polyethylene glycol 3350 17 Gram(s) Oral every 24 hours  propofol Infusion 30 MICROgram(s)/kG/Min (13.4 mL/Hr) IV Continuous <Continuous>  senna 2 Tablet(s) Oral at bedtime  sodium chloride 0.9% lock flush 3 milliLiter(s) IV Push every 8 hours  vasopressin Infusion 0.08 Unit(s)/Min (12 mL/Hr) IV Continuous <Continuous>    MEDICATIONS  (PRN):  dextrose Oral Gel 15 Gram(s) Oral once PRN Blood Glucose LESS THAN 70 milliGRAM(s)/deciliter  fentaNYL    Injectable 25 MICROGram(s) IV Push every 4 hours PRN Severe Pain (7 - 10)      REVIEW OF SYSTEMS:  unable to obtain    VITALS:  T(F): 98.2 (05-12-25 @ 14:17), Max: 98.2 (05-12-25 @ 05:20)  HR: 104 (05-12-25 @ 15:00)  BP: 96/47 (05-12-25 @ 05:00)  RR: 18 (05-12-25 @ 15:00)  SpO2: 99% (05-12-25 @ 15:00)  Wt(kg): --    05-10 @ 07:01  -  05-11 @ 07:00  --------------------------------------------------------  IN: 3080.6 mL / OUT: 3745 mL / NET: -664.4 mL    05-11 @ 07:01  -  05-12 @ 07:00  --------------------------------------------------------  IN: 4433.6 mL / OUT: 4635 mL / NET: -201.4 mL    05-12 @ 07:01  -  05-12 @ 15:52  --------------------------------------------------------  IN: 779.4 mL / OUT: 2480 mL / NET: -1700.6 mL          PHYSICAL EXAM:  Constitutional: Intubated, sedated  HEENT: NCAT, ETT in place  Resp: Mechanically ventilated, no respiratory distress  Cardio: Tachycardic  GI: Abd non-distended  : +deborah  Ext: No LE edema  Neuro: sedated    LABS:  05-12    142  |  105  |  43[H]  ----------------------------<  123[H]  4.5   |  27  |  1.72[H]    Ca    8.6      12 May 2025 11:57  Phos  3.3     05-12  Mg     2.0     05-12    TPro  5.6[L]  /  Alb  2.7[L]  /  TBili  0.4  /  DBili      /  AST  86[H]  /  ALT  132[H]  /  AlkPhos  85  05-12                          9.6    4.74  )-----------( 114      ( 12 May 2025 11:57 )             28.4         Urine Studies:  Creatinine Trend: 1.72<--, 1.79<--, 1.82<--, 1.76<--, 1.83<--, 1.92<--  Urinalysis Basic - ( 12 May 2025 11:57 )    Color:  / Appearance:  / SG:  / pH:   Gluc: 123 mg/dL / Ketone:   / Bili:  / Urobili:    Blood:  / Protein:  / Nitrite:    Leuk Esterase:  / RBC:  / WBC    Sq Epi:  / Non Sq Epi:  / Bacteria:           RADIOLOGY & ADDITIONAL STUDIES:  Reviewed

## 2025-05-12 NOTE — PATIENT PROFILE ADULT - FUNCTIONAL ASSESSMENT - BASIC MOBILITY 1.
Principal Discharge DX:	Ureteral stone with hydronephrosis  Goal:	improvement after surgery  Instructions for follow-up, activity and diet:	regular diet, activity as tolerated, nephrostomy to gravity- no irrigation needed. If fever >100.4 or any change or worsening of symptoms please call doctor or report to ED. Make followup appointment with Dr churchill call office 1 = Total assistance

## 2025-05-12 NOTE — PROVIDER CONTACT NOTE (CRITICAL VALUE NOTIFICATION) - BACKGROUND
Anaerobic & aerobic blood culture collected on May 10th resulted in gram positive cocci in pairs & chain

## 2025-05-12 NOTE — PROGRESS NOTE ADULT - ATTENDING COMMENTS
Following for ischemic ATN in a patient w/o hx of underlying renal disease  Patient seen and examined  VS, labs and EMR reviewed  Agree with fellow's note    further recs as above  Discussed with primary team
I agree with the fellow's findings and plans as written above with the following additions/amendments:    Seen and examined at bedside, intubated, sedated, rising pressor requirements but continues to diurese well, will continue for now, monitoring closely for further kidney improvement, further recs as above, discussed in detail with primary team

## 2025-05-12 NOTE — PATIENT PROFILE ADULT - FALL HARM RISK - HARM RISK INTERVENTIONS

## 2025-05-12 NOTE — PROGRESS NOTE ADULT - ASSESSMENT
78 yo M w/ PMH of HTN, HLD, DM2 who is now transferred from [facility] for IE, has TTE showing reduce LVEF with thickening of AV leaflets concerning for vegetation. Nephrology consulted for LANETTE.    -Imp: Non oliguric iATN   Multifactorial iATN (hypotension secondary to cardiogenic shock, ?embolic events)    -Plan:  sCr stable to slightly improving  Now off Lasix gtt with 1.5L recorded out from CT. Monitor volume status.  Continue pressors/inotrope, aim for SBP>110 as possible  Obtain Renal US, Cystatin C and U. prot/creat.   Strict I&O. Keep Campuzano placed.   Patient's family member at bedside states that dialysis is not within patient's goals of care.

## 2025-05-13 LAB
ADD ON TEST-SPECIMEN IN LAB: SIGNIFICANT CHANGE UP
ALBUMIN SERPL ELPH-MCNC: 2.1 G/DL — LOW (ref 3.3–5)
ALBUMIN SERPL ELPH-MCNC: 2.3 G/DL — LOW (ref 3.3–5)
ALP SERPL-CCNC: 69 U/L — SIGNIFICANT CHANGE UP (ref 40–120)
ALP SERPL-CCNC: 71 U/L — SIGNIFICANT CHANGE UP (ref 40–120)
ALP SERPL-CCNC: 71 U/L — SIGNIFICANT CHANGE UP (ref 40–120)
ALP SERPL-CCNC: 74 U/L — SIGNIFICANT CHANGE UP (ref 40–120)
ALT FLD-CCNC: 112 U/L — HIGH (ref 10–45)
ALT FLD-CCNC: 87 U/L — HIGH (ref 10–45)
ALT FLD-CCNC: 94 U/L — HIGH (ref 10–45)
ALT FLD-CCNC: 95 U/L — HIGH (ref 10–45)
ANION GAP SERPL CALC-SCNC: 12 MMOL/L — SIGNIFICANT CHANGE UP (ref 5–17)
ANION GAP SERPL CALC-SCNC: 13 MMOL/L — SIGNIFICANT CHANGE UP (ref 5–17)
ANION GAP SERPL CALC-SCNC: 16 MMOL/L — SIGNIFICANT CHANGE UP (ref 5–17)
ANION GAP SERPL CALC-SCNC: 8 MMOL/L — SIGNIFICANT CHANGE UP (ref 5–17)
APTT BLD: 27.3 SEC — SIGNIFICANT CHANGE UP (ref 26.1–36.8)
APTT BLD: 27.5 SEC — SIGNIFICANT CHANGE UP (ref 26.1–36.8)
APTT BLD: 28.1 SEC — SIGNIFICANT CHANGE UP (ref 26.1–36.8)
APTT BLD: 28.2 SEC — SIGNIFICANT CHANGE UP (ref 26.1–36.8)
AST SERPL-CCNC: 52 U/L — HIGH (ref 10–40)
AST SERPL-CCNC: 59 U/L — HIGH (ref 10–40)
AST SERPL-CCNC: 60 U/L — HIGH (ref 10–40)
AST SERPL-CCNC: 74 U/L — HIGH (ref 10–40)
BASE EXCESS BLDV CALC-SCNC: 1.7 MMOL/L — SIGNIFICANT CHANGE UP (ref -2–3)
BASE EXCESS BLDV CALC-SCNC: 4.2 MMOL/L — HIGH (ref -2–3)
BASE EXCESS BLDV CALC-SCNC: 5.2 MMOL/L — HIGH (ref -2–3)
BASOPHILS # BLD AUTO: 0 K/UL — SIGNIFICANT CHANGE UP (ref 0–0.2)
BASOPHILS NFR BLD AUTO: 0 % — SIGNIFICANT CHANGE UP (ref 0–2)
BILIRUB SERPL-MCNC: 0.3 MG/DL — SIGNIFICANT CHANGE UP (ref 0.2–1.2)
BILIRUB SERPL-MCNC: 0.4 MG/DL — SIGNIFICANT CHANGE UP (ref 0.2–1.2)
BUN SERPL-MCNC: 38 MG/DL — HIGH (ref 7–23)
BUN SERPL-MCNC: 39 MG/DL — HIGH (ref 7–23)
BUN SERPL-MCNC: 41 MG/DL — HIGH (ref 7–23)
BUN SERPL-MCNC: 41 MG/DL — HIGH (ref 7–23)
CALCIUM SERPL-MCNC: 8.2 MG/DL — LOW (ref 8.4–10.5)
CALCIUM SERPL-MCNC: 8.2 MG/DL — LOW (ref 8.4–10.5)
CALCIUM SERPL-MCNC: 8.3 MG/DL — LOW (ref 8.4–10.5)
CALCIUM SERPL-MCNC: 8.8 MG/DL — SIGNIFICANT CHANGE UP (ref 8.4–10.5)
CHLORIDE SERPL-SCNC: 103 MMOL/L — SIGNIFICANT CHANGE UP (ref 96–108)
CHLORIDE SERPL-SCNC: 104 MMOL/L — SIGNIFICANT CHANGE UP (ref 96–108)
CHLORIDE SERPL-SCNC: 106 MMOL/L — SIGNIFICANT CHANGE UP (ref 96–108)
CHLORIDE SERPL-SCNC: 106 MMOL/L — SIGNIFICANT CHANGE UP (ref 96–108)
CO2 BLDV-SCNC: 28 MMOL/L — HIGH (ref 22–26)
CO2 BLDV-SCNC: 31 MMOL/L — HIGH (ref 22–26)
CO2 BLDV-SCNC: 31 MMOL/L — HIGH (ref 22–26)
CO2 SERPL-SCNC: 21 MMOL/L — LOW (ref 22–31)
CO2 SERPL-SCNC: 24 MMOL/L — SIGNIFICANT CHANGE UP (ref 22–31)
CO2 SERPL-SCNC: 26 MMOL/L — SIGNIFICANT CHANGE UP (ref 22–31)
CO2 SERPL-SCNC: 29 MMOL/L — SIGNIFICANT CHANGE UP (ref 22–31)
CREAT SERPL-MCNC: 1.51 MG/DL — HIGH (ref 0.5–1.3)
CREAT SERPL-MCNC: 1.51 MG/DL — HIGH (ref 0.5–1.3)
CREAT SERPL-MCNC: 1.56 MG/DL — HIGH (ref 0.5–1.3)
CREAT SERPL-MCNC: 1.59 MG/DL — HIGH (ref 0.5–1.3)
CULTURE RESULTS: ABNORMAL
EGFR: 44 ML/MIN/1.73M2 — LOW
EGFR: 44 ML/MIN/1.73M2 — LOW
EGFR: 45 ML/MIN/1.73M2 — LOW
EGFR: 45 ML/MIN/1.73M2 — LOW
EGFR: 47 ML/MIN/1.73M2 — LOW
EOSINOPHIL # BLD AUTO: 0 K/UL — SIGNIFICANT CHANGE UP (ref 0–0.5)
EOSINOPHIL NFR BLD AUTO: 0 % — SIGNIFICANT CHANGE UP (ref 0–6)
GAS PNL BLDA: SIGNIFICANT CHANGE UP
GLUCOSE SERPL-MCNC: 114 MG/DL — HIGH (ref 70–99)
GLUCOSE SERPL-MCNC: 152 MG/DL — HIGH (ref 70–99)
GLUCOSE SERPL-MCNC: 173 MG/DL — HIGH (ref 70–99)
GLUCOSE SERPL-MCNC: 81 MG/DL — SIGNIFICANT CHANGE UP (ref 70–99)
HCO3 BLDV-SCNC: 27 MMOL/L — SIGNIFICANT CHANGE UP (ref 22–29)
HCO3 BLDV-SCNC: 29 MMOL/L — SIGNIFICANT CHANGE UP (ref 22–29)
HCO3 BLDV-SCNC: 30 MMOL/L — HIGH (ref 22–29)
HCT VFR BLD CALC: 29.8 % — LOW (ref 39–50)
HCT VFR BLD CALC: 31.5 % — LOW (ref 39–50)
HCT VFR BLD CALC: 31.6 % — LOW (ref 39–50)
HCT VFR BLD CALC: 32.1 % — LOW (ref 39–50)
HGB BLD-MCNC: 10 G/DL — LOW (ref 13–17)
HGB BLD-MCNC: 10.4 G/DL — LOW (ref 13–17)
HGB BLD-MCNC: 10.5 G/DL — LOW (ref 13–17)
HGB BLD-MCNC: 10.6 G/DL — LOW (ref 13–17)
INR BLD: 0.89 — SIGNIFICANT CHANGE UP (ref 0.85–1.16)
INR BLD: 0.91 — SIGNIFICANT CHANGE UP (ref 0.85–1.16)
INR BLD: 0.92 — SIGNIFICANT CHANGE UP (ref 0.85–1.16)
INR BLD: 0.93 — SIGNIFICANT CHANGE UP (ref 0.85–1.16)
LYMPHOCYTES # BLD AUTO: 0.55 K/UL — LOW (ref 1–3.3)
LYMPHOCYTES # BLD AUTO: 21.9 % — SIGNIFICANT CHANGE UP (ref 13–44)
MAGNESIUM SERPL-MCNC: 1.9 MG/DL — SIGNIFICANT CHANGE UP (ref 1.6–2.6)
MAGNESIUM SERPL-MCNC: 2.3 MG/DL — SIGNIFICANT CHANGE UP (ref 1.6–2.6)
MAGNESIUM SERPL-MCNC: 2.4 MG/DL — SIGNIFICANT CHANGE UP (ref 1.6–2.6)
MAGNESIUM SERPL-MCNC: 2.8 MG/DL — HIGH (ref 1.6–2.6)
MCHC RBC-ENTMCNC: 29.2 PG — SIGNIFICANT CHANGE UP (ref 27–34)
MCHC RBC-ENTMCNC: 29.3 PG — SIGNIFICANT CHANGE UP (ref 27–34)
MCHC RBC-ENTMCNC: 29.7 PG — SIGNIFICANT CHANGE UP (ref 27–34)
MCHC RBC-ENTMCNC: 29.8 PG — SIGNIFICANT CHANGE UP (ref 27–34)
MCHC RBC-ENTMCNC: 32.7 G/DL — SIGNIFICANT CHANGE UP (ref 32–36)
MCHC RBC-ENTMCNC: 33 G/DL — SIGNIFICANT CHANGE UP (ref 32–36)
MCHC RBC-ENTMCNC: 33.5 G/DL — SIGNIFICANT CHANGE UP (ref 32–36)
MCHC RBC-ENTMCNC: 33.6 G/DL — SIGNIFICANT CHANGE UP (ref 32–36)
MCV RBC AUTO: 88.4 FL — SIGNIFICANT CHANGE UP (ref 80–100)
MCV RBC AUTO: 88.7 FL — SIGNIFICANT CHANGE UP (ref 80–100)
MCV RBC AUTO: 88.8 FL — SIGNIFICANT CHANGE UP (ref 80–100)
MCV RBC AUTO: 89.2 FL — SIGNIFICANT CHANGE UP (ref 80–100)
MONOCYTES # BLD AUTO: 0.12 K/UL — SIGNIFICANT CHANGE UP (ref 0–0.9)
MONOCYTES NFR BLD AUTO: 4.9 % — SIGNIFICANT CHANGE UP (ref 2–14)
NEUTROPHILS # BLD AUTO: 1.84 K/UL — SIGNIFICANT CHANGE UP (ref 1.8–7.4)
NEUTROPHILS NFR BLD AUTO: 73.2 % — SIGNIFICANT CHANGE UP (ref 43–77)
NRBC BLD AUTO-RTO: 0 /100 WBCS — SIGNIFICANT CHANGE UP (ref 0–0)
PCO2 BLDV: 42 MMHG — SIGNIFICANT CHANGE UP (ref 42–55)
PCO2 BLDV: 43 MMHG — SIGNIFICANT CHANGE UP (ref 42–55)
PCO2 BLDV: 44 MMHG — SIGNIFICANT CHANGE UP (ref 42–55)
PH BLDV: 7.41 — SIGNIFICANT CHANGE UP (ref 7.32–7.43)
PH BLDV: 7.43 — SIGNIFICANT CHANGE UP (ref 7.32–7.43)
PH BLDV: 7.45 — HIGH (ref 7.32–7.43)
PHOSPHATE SERPL-MCNC: 3.6 MG/DL — SIGNIFICANT CHANGE UP (ref 2.5–4.5)
PHOSPHATE SERPL-MCNC: 3.7 MG/DL — SIGNIFICANT CHANGE UP (ref 2.5–4.5)
PHOSPHATE SERPL-MCNC: 3.9 MG/DL — SIGNIFICANT CHANGE UP (ref 2.5–4.5)
PHOSPHATE SERPL-MCNC: 4 MG/DL — SIGNIFICANT CHANGE UP (ref 2.5–4.5)
PLATELET # BLD AUTO: 118 K/UL — LOW (ref 150–400)
PLATELET # BLD AUTO: 90 K/UL — LOW (ref 150–400)
PLATELET # BLD AUTO: 96 K/UL — LOW (ref 150–400)
PLATELET # BLD AUTO: 98 K/UL — LOW (ref 150–400)
PO2 BLDV: 36 MMHG — SIGNIFICANT CHANGE UP (ref 25–45)
PO2 BLDV: 36 MMHG — SIGNIFICANT CHANGE UP (ref 25–45)
PO2 BLDV: 42 MMHG — SIGNIFICANT CHANGE UP (ref 25–45)
POTASSIUM SERPL-MCNC: 3.7 MMOL/L — SIGNIFICANT CHANGE UP (ref 3.5–5.3)
POTASSIUM SERPL-MCNC: 4 MMOL/L — SIGNIFICANT CHANGE UP (ref 3.5–5.3)
POTASSIUM SERPL-MCNC: 4.4 MMOL/L — SIGNIFICANT CHANGE UP (ref 3.5–5.3)
POTASSIUM SERPL-MCNC: 4.8 MMOL/L — SIGNIFICANT CHANGE UP (ref 3.5–5.3)
POTASSIUM SERPL-SCNC: 3.7 MMOL/L — SIGNIFICANT CHANGE UP (ref 3.5–5.3)
POTASSIUM SERPL-SCNC: 4 MMOL/L — SIGNIFICANT CHANGE UP (ref 3.5–5.3)
POTASSIUM SERPL-SCNC: 4.4 MMOL/L — SIGNIFICANT CHANGE UP (ref 3.5–5.3)
POTASSIUM SERPL-SCNC: 4.8 MMOL/L — SIGNIFICANT CHANGE UP (ref 3.5–5.3)
PROT SERPL-MCNC: 5 G/DL — LOW (ref 6–8.3)
PROT SERPL-MCNC: 5 G/DL — LOW (ref 6–8.3)
PROT SERPL-MCNC: 5.3 G/DL — LOW (ref 6–8.3)
PROT SERPL-MCNC: 5.5 G/DL — LOW (ref 6–8.3)
PROTHROM AB SERPL-ACNC: 10.4 SEC — SIGNIFICANT CHANGE UP (ref 9.9–13.4)
PROTHROM AB SERPL-ACNC: 10.5 SEC — SIGNIFICANT CHANGE UP (ref 9.9–13.4)
PROTHROM AB SERPL-ACNC: 10.6 SEC — SIGNIFICANT CHANGE UP (ref 9.9–13.4)
PROTHROM AB SERPL-ACNC: 10.9 SEC — SIGNIFICANT CHANGE UP (ref 9.9–13.4)
RBC # BLD: 3.37 M/UL — LOW (ref 4.2–5.8)
RBC # BLD: 3.55 M/UL — LOW (ref 4.2–5.8)
RBC # BLD: 3.56 M/UL — LOW (ref 4.2–5.8)
RBC # BLD: 3.6 M/UL — LOW (ref 4.2–5.8)
RBC # FLD: 18 % — HIGH (ref 10.3–14.5)
RBC # FLD: 18.3 % — HIGH (ref 10.3–14.5)
RBC # FLD: 18.6 % — HIGH (ref 10.3–14.5)
RBC # FLD: 18.7 % — HIGH (ref 10.3–14.5)
SAO2 % BLDV: 60 % — LOW (ref 67–88)
SAO2 % BLDV: 66.5 % — LOW (ref 67–88)
SAO2 % BLDV: 70.1 % — SIGNIFICANT CHANGE UP (ref 67–88)
SODIUM SERPL-SCNC: 140 MMOL/L — SIGNIFICANT CHANGE UP (ref 135–145)
SODIUM SERPL-SCNC: 140 MMOL/L — SIGNIFICANT CHANGE UP (ref 135–145)
SODIUM SERPL-SCNC: 143 MMOL/L — SIGNIFICANT CHANGE UP (ref 135–145)
SODIUM SERPL-SCNC: 145 MMOL/L — SIGNIFICANT CHANGE UP (ref 135–145)
SPECIMEN SOURCE: SIGNIFICANT CHANGE UP
WBC # BLD: 2.27 K/UL — LOW (ref 3.8–10.5)
WBC # BLD: 2.52 K/UL — LOW (ref 3.8–10.5)
WBC # BLD: 2.75 K/UL — LOW (ref 3.8–10.5)
WBC # BLD: 3.81 K/UL — SIGNIFICANT CHANGE UP (ref 3.8–10.5)
WBC # FLD AUTO: 2.27 K/UL — LOW (ref 3.8–10.5)
WBC # FLD AUTO: 2.52 K/UL — LOW (ref 3.8–10.5)
WBC # FLD AUTO: 2.75 K/UL — LOW (ref 3.8–10.5)
WBC # FLD AUTO: 3.81 K/UL — SIGNIFICANT CHANGE UP (ref 3.8–10.5)

## 2025-05-13 PROCEDURE — 93312 ECHO TRANSESOPHAGEAL: CPT | Mod: 26

## 2025-05-13 PROCEDURE — G0545: CPT

## 2025-05-13 PROCEDURE — 71045 X-RAY EXAM CHEST 1 VIEW: CPT | Mod: 26

## 2025-05-13 PROCEDURE — 99292 CRITICAL CARE ADDL 30 MIN: CPT

## 2025-05-13 PROCEDURE — 99232 SBSQ HOSP IP/OBS MODERATE 35: CPT

## 2025-05-13 PROCEDURE — 76377 3D RENDER W/INTRP POSTPROCES: CPT | Mod: 26

## 2025-05-13 PROCEDURE — 99291 CRITICAL CARE FIRST HOUR: CPT

## 2025-05-13 PROCEDURE — 99233 SBSQ HOSP IP/OBS HIGH 50: CPT

## 2025-05-13 RX ORDER — DEXMEDETOMIDINE HYDROCHLORIDE IN SODIUM CHLORIDE 4 UG/ML
0.2 INJECTION INTRAVENOUS
Qty: 400 | Refills: 0 | Status: DISCONTINUED | OUTPATIENT
Start: 2025-05-13 | End: 2025-05-13

## 2025-05-13 RX ORDER — CALCIUM GLUCONATE 20 MG/ML
2 INJECTION, SOLUTION INTRAVENOUS ONCE
Refills: 0 | Status: COMPLETED | OUTPATIENT
Start: 2025-05-13 | End: 2025-05-13

## 2025-05-13 RX ORDER — AMIODARONE HYDROCHLORIDE 50 MG/ML
150 INJECTION, SOLUTION INTRAVENOUS ONCE
Refills: 0 | Status: COMPLETED | OUTPATIENT
Start: 2025-05-13 | End: 2025-05-13

## 2025-05-13 RX ORDER — BUMETANIDE 1 MG/1
2 TABLET ORAL ONCE
Refills: 0 | Status: COMPLETED | OUTPATIENT
Start: 2025-05-13 | End: 2025-05-13

## 2025-05-13 RX ORDER — BUMETANIDE 1 MG/1
0.25 TABLET ORAL
Qty: 20 | Refills: 0 | Status: DISCONTINUED | OUTPATIENT
Start: 2025-05-13 | End: 2025-05-15

## 2025-05-13 RX ORDER — FUROSEMIDE 10 MG/ML
40 INJECTION INTRAMUSCULAR; INTRAVENOUS EVERY 12 HOURS
Refills: 0 | Status: DISCONTINUED | OUTPATIENT
Start: 2025-05-13 | End: 2025-05-13

## 2025-05-13 RX ORDER — MAGNESIUM SULFATE 500 MG/ML
2 SYRINGE (ML) INJECTION ONCE
Refills: 0 | Status: COMPLETED | OUTPATIENT
Start: 2025-05-13 | End: 2025-05-13

## 2025-05-13 RX ADMIN — AMPICILLIN SODIUM 216 GRAM(S): 1 INJECTION, POWDER, FOR SOLUTION INTRAMUSCULAR; INTRAVENOUS at 00:04

## 2025-05-13 RX ADMIN — PROPOFOL 13.4 MICROGRAM(S)/KG/MIN: 10 INJECTION, EMULSION INTRAVENOUS at 02:02

## 2025-05-13 RX ADMIN — Medication 100 MILLIEQUIVALENT(S): at 09:32

## 2025-05-13 RX ADMIN — Medication 1 APPLICATION(S): at 06:00

## 2025-05-13 RX ADMIN — AMIODARONE HYDROCHLORIDE 133.33 MILLIGRAM(S): 50 INJECTION, SOLUTION INTRAVENOUS at 04:05

## 2025-05-13 RX ADMIN — AMIODARONE HYDROCHLORIDE 133.33 MILLIGRAM(S): 50 INJECTION, SOLUTION INTRAVENOUS at 09:33

## 2025-05-13 RX ADMIN — DEXMEDETOMIDINE HYDROCHLORIDE IN SODIUM CHLORIDE 3.74 MICROGRAM(S)/KG/HR: 4 INJECTION INTRAVENOUS at 09:32

## 2025-05-13 RX ADMIN — CALCIUM GLUCONATE 200 GRAM(S): 20 INJECTION, SOLUTION INTRAVENOUS at 11:37

## 2025-05-13 RX ADMIN — AMPICILLIN SODIUM 216 GRAM(S): 1 INJECTION, POWDER, FOR SOLUTION INTRAMUSCULAR; INTRAVENOUS at 18:43

## 2025-05-13 RX ADMIN — Medication 3 MILLILITER(S): at 06:20

## 2025-05-13 RX ADMIN — Medication 25 MICROGRAM(S): at 14:00

## 2025-05-13 RX ADMIN — Medication 25 MICROGRAM(S): at 03:02

## 2025-05-13 RX ADMIN — HEPARIN SODIUM 5000 UNIT(S): 1000 INJECTION INTRAVENOUS; SUBCUTANEOUS at 23:00

## 2025-05-13 RX ADMIN — Medication 25 MICROGRAM(S): at 10:55

## 2025-05-13 RX ADMIN — Medication 15 MILLILITER(S): at 06:21

## 2025-05-13 RX ADMIN — HEPARIN SODIUM 5000 UNIT(S): 1000 INJECTION INTRAVENOUS; SUBCUTANEOUS at 13:59

## 2025-05-13 RX ADMIN — Medication 3 MILLILITER(S): at 14:05

## 2025-05-13 RX ADMIN — PROPOFOL 13.4 MICROGRAM(S)/KG/MIN: 10 INJECTION, EMULSION INTRAVENOUS at 06:52

## 2025-05-13 RX ADMIN — Medication 25 MICROGRAM(S): at 03:20

## 2025-05-13 RX ADMIN — Medication 25 MICROGRAM(S): at 14:48

## 2025-05-13 RX ADMIN — AMPICILLIN SODIUM 216 GRAM(S): 1 INJECTION, POWDER, FOR SOLUTION INTRAMUSCULAR; INTRAVENOUS at 23:00

## 2025-05-13 RX ADMIN — Medication 3 MILLILITER(S): at 22:20

## 2025-05-13 RX ADMIN — AMPICILLIN SODIUM 216 GRAM(S): 1 INJECTION, POWDER, FOR SOLUTION INTRAMUSCULAR; INTRAVENOUS at 06:21

## 2025-05-13 RX ADMIN — HEPARIN SODIUM 5000 UNIT(S): 1000 INJECTION INTRAVENOUS; SUBCUTANEOUS at 06:21

## 2025-05-13 RX ADMIN — Medication 25 MICROGRAM(S): at 11:19

## 2025-05-13 RX ADMIN — Medication 100 MILLIEQUIVALENT(S): at 11:04

## 2025-05-13 RX ADMIN — BUMETANIDE 2 MILLIGRAM(S): 1 TABLET ORAL at 15:08

## 2025-05-13 RX ADMIN — Medication 81 MILLIGRAM(S): at 11:04

## 2025-05-13 RX ADMIN — CEFTRIAXONE 100 MILLIGRAM(S): 500 INJECTION, POWDER, FOR SOLUTION INTRAMUSCULAR; INTRAVENOUS at 17:47

## 2025-05-13 RX ADMIN — Medication 40 MILLIGRAM(S): at 06:21

## 2025-05-13 RX ADMIN — AMPICILLIN SODIUM 216 GRAM(S): 1 INJECTION, POWDER, FOR SOLUTION INTRAMUSCULAR; INTRAVENOUS at 11:04

## 2025-05-13 RX ADMIN — FUROSEMIDE 40 MILLIGRAM(S): 10 INJECTION INTRAMUSCULAR; INTRAVENOUS at 09:32

## 2025-05-13 RX ADMIN — Medication 25 GRAM(S): at 04:05

## 2025-05-13 RX ADMIN — CEFTRIAXONE 100 MILLIGRAM(S): 500 INJECTION, POWDER, FOR SOLUTION INTRAMUSCULAR; INTRAVENOUS at 06:21

## 2025-05-13 RX ADMIN — BUMETANIDE 1.25 MG/HR: 1 TABLET ORAL at 17:46

## 2025-05-13 NOTE — PROGRESS NOTE ADULT - ASSESSMENT
79M w/PMHx of HTN, T2DM presented to [facility] with sob, generalized weakness and hypoxic resp failure found to have LANETTE with transaminitis and CT chest suggestive of multifocal pneumonia. Blood cultures growing E.fecalis with TTE concerning for thickening of AV leaflets/vegetation. He was placed on Abx and Tx to [facility] for further mgmt.   A/p  Impression: LAWRENCE infectious endocarditis E. Derrellis   Presented with profound shock and high pressors requirement, admitted in CTU/ intubated and lined up with PAC in place   Has been making good progress with improving shock and pressors requirement/ improving LANETTE - has not required RRT as of yet   Currently on low dose Levophed and / short runs of afib overnight converted to sinus with 1 amio bolus and coming down on dobutamine   Thermodilution cardiac indices are low but otherwise with normal lactate, improving sCr and transaminitis, MVO2 > 65% and filling pressures in good range   Warm on exam with improving anasarca- would hold off on titrating up inotrops -- will continue to monitor perfusion labs   Standing diuretics for Neg FB / repleting lytes   H&H in good range   A1c 8.4 -- Good glycemic control with glucerna and SSC as needed - TF currently on hold as planned for SHELLY later today   Afebrile/ blood clx neg since -- continue daily blood clx until neg for 72 hours per ID - continue ceftriaxone/Amp   ICU ppx/ Completing PST   OR date TBD     ATTENDING: I have personally and independently provided 105 min of critical care services. This excludes any time spent on separate procedures or teaching. 79M w/PMHx of HTN, T2DM presented to [facility] with sob, generalized weakness and hypoxic resp failure found to have LANETTE with transaminitis and CT chest suggestive of multifocal pneumonia. Blood cultures growing E.fecalis with TTE concerning for thickening of AV leaflets/vegetation. He was placed on Abx and Tx to [facility] for further mgmt.   A/p  Impression: LAWRENCE infectious endocarditis E. Derrellis   Presented with profound shock and high pressors requirement, admitted in CTU/ intubated and lined up with PAC in place   Has been making good progress with improving shock and pressors requirement/ improving LANETTE - has not required RRT as of yet   Currently on low dose Levophed and / short runs of afib overnight converted to sinus with 1 amio bolus and coming down on dobutamine   Thermodilution cardiac indices are low but otherwise with normal lactate, improving sCr and transaminitis, MVO2 > 65% and filling pressures in good range   Warm on exam with improving anasarca- would hold off on titrating up inotrops -- will continue to monitor perfusion labs   Standing diuretics for Neg FB / repleting lytes   H&H in good range   A1c 8.4 -- Good glycemic control with glucerna and SSC as needed - TF currently on hold as planned for SHELLY later today   Afebrile/ blood clx neg since -- continue daily blood clx until neg for 72 hours per ID - continue ceftriaxone/Amp   ICU ppx/ RIJ day 5/ LIJ w/swan day 4  - obtaining PIV to deescalate lines    Completing PST   OR date TBD     ATTENDING: I have personally and independently provided 105 min of critical care services. This excludes any time spent on separate procedures or teaching. 79M w/PMHx of HTN, T2DM presented to [facility] with sob, generalized weakness and hypoxic resp failure found to have LANETTE with transaminitis and CT chest suggestive of multifocal pneumonia. Blood cultures growing E.fecalis with TTE concerning for thickening of AV leaflets/vegetation. He was placed on Abx and Tx to [facility] for further mgmt.   A/p  Impression: LAWRENCE infectious endocarditis E. Derrellis   Presented with profound shock and high pressors requirement, admitted in CTU/ intubated and lined up with PAC in place   Has been making good progress with improving shock and pressors requirement/ improving LANETTE - has not required RRT as of yet   Currently on low dose Levophed and / short runs of afib overnight converted to sinus with 1 amio bolus and coming down on dobutamine   Thermodilution cardiac indices are low but otherwise with normal lactate, improving sCr and transaminitis, MVO2 > 65% and filling pressures in good range   Warm on exam with improving anasarca- would hold off on titrating up inotrops -- will continue to monitor perfusion labs   Standing diuretics for Neg FB / repleting lytes   H&H in good range   A1c 8.4 -- Good glycemic control with glucerna and SSC as needed - TF currently on hold as planned for SHELLY later today   Afebrile/ blood clx neg since -- continue daily blood clx until neg for 72 hours per ID - Continue ceftriaxone/Amp   ICU ppx/ RIJ day 5/ LIJ w/swan day 4  - obtaining PIV to deescalate lines    Completing PST   OR date TBD     ATTENDING: I have personally and independently provided 105 min of critical care services. This excludes any time spent on separate procedures or teaching.

## 2025-05-13 NOTE — PROGRESS NOTE ADULT - ASSESSMENT
79y M with hx of HTN, HLD, DM2  transferred for IE. New TTE showed reduce LVEF with thickening of AV leaflets concerning for vegetation. Hospital course c/b  non oliguric LANETTE     1. Non oliguric LANETTE likely related to septic/ischemic ATN - improving  SCr peaked at 2.07 now at 1.5  Titrate pressors/inotropes to maintain SBP>110 as possible  Strict I&O  Stable electrolytes and volume status  Adjust and dose all meds to GFR< 10  Per family members at bedside, dialysis is not within patient's goals of care.  Expect gradual renal recovery    Discussed with primary team

## 2025-05-13 NOTE — CHART NOTE - NSCHARTNOTEFT_GEN_A_CORE
Chest Tube Removal:    Pt seen and examined at bedside.  Case discussed with attending surgeon. Minimal output from CTs.  No air leak appreciated.  CT removed without incident per attending request.  Occlusive DSD placed. CXR no obvious PTX noted.  Pt tolerated procedure well.

## 2025-05-13 NOTE — PROGRESS NOTE ADULT - SUBJECTIVE AND OBJECTIVE BOX
INTERVAL COURSE  Preop for E. Fecalis endocarditis   Arrived with severe shock, resp failure and non oliguric LANETTE - now with improving pressors requirement and sCr   PST pending   Afib overnight - converted to sinus with amio and coming down on   Neg FB with improving anasarca  Neuro intact     ICU Vital Signs Last 24 Hrs  T(C): 36.3 (13 May 2025 09:48), Max: 37.6 (12 May 2025 22:14)  T(F): 97.3 (13 May 2025 09:48), Max: 99.7 (12 May 2025 22:14)  HR: 90 (13 May 2025 10:00) (85 - 107)  ABP: 118/43 (13 May 2025 10:00) (102/41 - 130/42)  ABP(mean): 66 (13 May 2025 10:00) (59 - 70)  RR: 22 (13 May 2025 10:00) (14 - 30)  SpO2: 100% (13 May 2025 10:00) (98% - 100%)    O2 Parameters below as of 13 May 2025 10:00  Patient On (Oxygen Delivery Method): ventilator    O2 Concentration (%): 40      Adult Advanced Hemodynamics Last 24 Hrs  CVP(mm Hg): 13 (13 May 2025 10:00) (4 - 13)  CO: 2.6 (13 May 2025 09:00) (2.6 - 5.3)  CI: 1.3 (13 May 2025 09:00) (1.3 - 2.7)  PA: 47/27 (13 May 2025 10:00) (36/17 - 51/24)  PA(mean): 35 (13 May 2025 10:00) (24 - 37)  SVR: 1720 (13 May 2025 09:00) (889 - 1720)  SVRI: 3441 (13 May 2025 09:00) (2893 - 3441)  ABG - ( 13 May 2025 07:18 )  pH, Arterial: 7.45  pH, Blood: x     /  pCO2: 39    /  pO2: 175   / HCO3: 27    / Base Excess: 3.0   /  SaO2: 99.7      Mode: AC/ CMV (Assist Control/ Continuous Mandatory Ventilation)  RR (machine): 10  TV (machine): 550  FiO2: 40  PEEP: 6  ITime: 1  MAP: 10  PIP: 20      Daily   I&O's Summary    12 May 2025 07:01  -  13 May 2025 07:00  --------------------------------------------------------  IN: 2527.7 mL / OUT: 4725 mL / NET: -2197.3 mL    PHYSICAL EXAM  General: Intubated and sedated, good vent synchrony   Neck: no JVD  Respiratory: Rhonchorous anteriorly   Cardiovascular: Regular rhythm/rate  Gastrointestinal: Soft  Extremities: WWP; Mild anasarca   Neurological: deferred     LABS/IMAGING/EKG/ETC  Reviewed.

## 2025-05-13 NOTE — PROGRESS NOTE ADULT - ASSESSMENT
79M w/ PMHx of DM, HTN, HLD, hx of hospitalization in [place] (1/2025, 2/2025) for sepsis 2/2 acute   cholecystitis (s/p cholecystectomy), c/b postsurgical hematoma, b/l pleural effusions s/p thoracentesis of R pleural effusion on 3/3/25 at [facility] [pleural fluid and BCx neg at the time].  Pt son is an ED RN at [place] and tells me that pt had Enterococcal bacteremia while he was hospitalized in [place]. Source felt to be GI/cholecystitis at the time. Unsure of what Abx were given back then and for how long.   He subsequently went to rehab and eventually returned to [facility]. Was taken to [facility] for weakness and dyspnea x 2 wks with significant worsening 2d pta.  There he denied fever, chills, chest pain, nausea, vomiting, abdominal pain, diarrhea, dysuria; reported decreased   urination. There, he was tachypneic with O2 sats in the 80s on RA and low 90s on 4L O2 via NC. CXR with fluid overload vs pneumonia. WBC WNL with N predom, UA neg, UCx w <10k UGF.   CT c/a/p showed Interval development of multifocal central consolidative opacities throughout the lungs, particularly the upper lobes and left lower lobe, R pleural effusion was noted to have dec in size, new small L pleural effusion (complex per HIE crit care note), significant interval decrease in size of the subcapsular hematoma along the inferior aspect of the liver.  Patient reportedly met sepsis criteria and was admitted to the ICU for sepsis and was in resp distress. Was given Vanc/Piptazo/Azithro + stress steroids. Was on bipap.   5/8 BCx + E fecalis (S- Amp, Vanco).   5/9 BCx + (4/4) w E fecalis.   5/10 BCx + E fecalis  5/11 BCx ngtd  5/12 BCx ngtd  5/13 BCx spec recvd  Echo with aortic valve vegetations, EF 40-45%  Xferred to [facility] for possible surgical mgmt of AoV endocarditis and critical care.  Intubated at [facility], on pressors.     #Enterococcal bacteremia and AoV (NV) endocarditis  - Please send daily BCx until neg for 72 hr  - F/u surveillance BCx  - SHELLY when able  - Cont IV Ampicillin 2 gm q6h (renally adjusted)  - Cont IV Ceftriaxone 2gm q12h   - F/u CTS recs  ID Team 1 will follow.

## 2025-05-13 NOTE — PROGRESS NOTE ADULT - SUBJECTIVE AND OBJECTIVE BOX
Seen and evaluated at bedside, no overnight events reported by primary team. Remains intubated and sedated  UOP: 1925ml      REVIEW OF SYSTEMS: unable to fully test       Standing Inpatient Medications  ampicillin  IVPB      ampicillin  IVPB 2 Gram(s) IV Intermittent every 6 hours  aspirin  chewable 81 milliGRAM(s) Oral daily  cefTRIAXone   IVPB 2000 milliGRAM(s) IV Intermittent every 12 hours  chlorhexidine 0.12% Liquid 15 milliLiter(s) Oral Mucosa every 12 hours  chlorhexidine 2% Cloths 1 Application(s) Topical <User Schedule>  dexMEDEtomidine Infusion 0.2 MICROgram(s)/kG/Hr IV Continuous <Continuous>  dextrose 5%. 1000 milliLiter(s) IV Continuous <Continuous>  dextrose 5%. 1000 milliLiter(s) IV Continuous <Continuous>  dextrose 50% Injectable 50 milliLiter(s) IV Push every 15 minutes  DOBUTamine Infusion 2 MICROgram(s)/kG/Min IV Continuous <Continuous>  furosemide   Injectable 40 milliGRAM(s) IV Push every 12 hours  glucagon  Injectable 1 milliGRAM(s) IntraMuscular once  heparin   Injectable 5000 Unit(s) SubCutaneous every 8 hours  insulin regular Infusion 5 Unit(s)/Hr IV Continuous <Continuous>  norepinephrine Infusion 0.05 MICROgram(s)/kG/Min IV Continuous <Continuous>  pantoprazole  Injectable 40 milliGRAM(s) IV Push every 24 hours  polyethylene glycol 3350 17 Gram(s) Oral every 24 hours  propofol Infusion 30 MICROgram(s)/kG/Min IV Continuous <Continuous>  senna 2 Tablet(s) Oral at bedtime  sodium chloride 0.9% lock flush 3 milliLiter(s) IV Push every 8 hours    PRN Inpatient Medications  dextrose Oral Gel 15 Gram(s) Oral once PRN  fentaNYL    Injectable 25 MICROGram(s) IV Push every 4 hours PRN        VITALS/PHYSICAL EXAM  --------------------------------------------------------------------------------  T(C): 36.3 (05-13-25 @ 09:48), Max: 37.6 (05-12-25 @ 22:14)  HR: 84 (05-13-25 @ 13:00) (78 - 107)  BP: --  RR: 16 (05-13-25 @ 13:00) (14 - 25)  SpO2: 100% (05-13-25 @ 13:00) (98% - 100%)  Wt(kg): --        05-12-25 @ 07:01  -  05-13-25 @ 07:00  --------------------------------------------------------  IN: 2527.7 mL / OUT: 4725 mL / NET: -2197.3 mL    05-13-25 @ 07:01  -  05-13-25 @ 13:51  --------------------------------------------------------  IN: 613.7 mL / OUT: 510 mL / NET: 103.7 mL      PHYSICAL EXAM  General: Intubated and sedated, good vent synchrony   Neck: no JVD  Respiratory: Rhonchorous anteriorly   Cardiovascular: Regular rhythm/rate  Gastrointestinal: Soft  Extremities: WWP; Mild anasarca   Neurological: deferred     LABS/STUDIES  --------------------------------------------------------------------------------              10.6   2.52  >-----------<  90       [05-13-25 @ 11:21]              31.6     140  |  104  |  38  ----------------------------<  152      [05-13-25 @ 11:28]  4.8   |  24  |  1.51        Ca     8.2     [05-13-25 @ 11:28]      Mg     2.4     [05-13-25 @ 11:28]      Phos  4.0     [05-13-25 @ 11:28]    TPro  5.0  /  Alb  2.1  /  TBili  0.3  /  DBili  x   /  AST  59  /  ALT  95  /  AlkPhos  71  [05-13-25 @ 11:28]    PT/INR: PT 10.6 , INR 0.92       [05-13-25 @ 11:28]  PTT: 28.2       [05-13-25 @ 11:28]      Creatinine Trend:  SCr 1.51 [05-13 @ 11:28]  SCr 1.56 [05-13 @ 07:31]  SCr 1.59 [05-13 @ 01:49]  SCr 1.71 [05-12 @ 18:17]  SCr 1.72 [05-12 @ 11:57]    Urinalysis - [05-13-25 @ 11:28]      Color  / Appearance  / SG  / pH       Gluc 152 / Ketone   / Bili  / Urobili        Blood  / Protein  / Leuk Est  / Nitrite       RBC  / WBC  / Hyaline  / Gran  / Sq Epi  / Non Sq Epi  / Bacteria       TSH 0.939      [05-09-25 @ 17:21]  Lipid: chol 72, , HDL 15, LDL --      [05-09-25 @ 17:21]

## 2025-05-13 NOTE — ADVANCED PRACTICE NURSE CONSULT - RECOMMEDATIONS
Triad ointment to wound daily. Spoke with RN Chiqui and house staff. Total time spent on encounter=25 minutes.

## 2025-05-13 NOTE — PROGRESS NOTE ADULT - SUBJECTIVE AND OBJECTIVE BOX
INTERVAL HPI/OVERNIGHT EVENTS:    ROS unobtainable    ANTIBIOTICS/RELEVANT:          Vital Signs Last 24 Hrs  T(C): 37 (13 May 2025 14:30), Max: 37.6 (12 May 2025 22:14)  T(F): 98.6 (13 May 2025 14:30), Max: 99.7 (12 May 2025 22:14)  HR: 80 (13 May 2025 14:00) (78 - 107)  BP: --  BP(mean): --  RR: 16 (13 May 2025 14:00) (14 - 25)  SpO2: 100% (13 May 2025 14:00) (98% - 100%)    Parameters below as of 13 May 2025 14:00  Patient On (Oxygen Delivery Method): ventilator    O2 Concentration (%): 40    PHYSICAL EXAM:  Constitutional:  Alert  Eyes:  Sclerae anicteric, conjunctivae clear, PERRL  Ear/Nose/Throat:  No nasal exudate or sinus tenderness;  No buccal mucosal lesions, no pharyngeal erythema or exudate	  Neck:  Supple, no adenopathy  Respiratory:  Clear bilaterally  Cardiovascular:  RRR, S1S2, no murmur appreciated  Gastrointestinal:  Symmetric, normoactive BS, soft, NT, no masses, guarding or rebound.  No HSM  Extremities:  No edema      LABS:                        10.6   2.52  )-----------( 90       ( 13 May 2025 11:21 )             31.6         05-13    140  |  104  |  38[H]  ----------------------------<  152[H]  4.8   |  24  |  1.51[H]    Ca    8.2[L]      13 May 2025 11:28  Phos  4.0     05-13  Mg     2.4     05-13    TPro  5.0[L]  /  Alb  2.1[L]  /  TBili  0.3  /  DBili  x   /  AST  59[H]  /  ALT  95[H]  /  AlkPhos  71  05-13      Urinalysis Basic - ( 13 May 2025 11:28 )    Color: x / Appearance: x / SG: x / pH: x  Gluc: 152 mg/dL / Ketone: x  / Bili: x / Urobili: x   Blood: x / Protein: x / Nitrite: x   Leuk Esterase: x / RBC: x / WBC x   Sq Epi: x / Non Sq Epi: x / Bacteria: x        MICROBIOLOGY:        RADIOLOGY & ADDITIONAL STUDIES: INTERVAL HPI/OVERNIGHT EVENTS: Remains intubated, family at bedside.     ROS unobtainable    ANTIBIOTICS/RELEVANT:    ampicillin  IVPB   216 mL/Hr IV Intermittent (05-10-25 @ 14:23)    ampicillin  IVPB   216 mL/Hr IV Intermittent (05-13-25 @ 23:00)   216 mL/Hr IV Intermittent (05-13-25 @ 18:43)   216 mL/Hr IV Intermittent (05-13-25 @ 11:04)   216 mL/Hr IV Intermittent (05-13-25 @ 06:21)   216 mL/Hr IV Intermittent (05-13-25 @ 00:04)   216 mL/Hr IV Intermittent (05-12-25 @ 17:46)   216 mL/Hr IV Intermittent (05-12-25 @ 11:08)   216 mL/Hr IV Intermittent (05-12-25 @ 06:42)   216 mL/Hr IV Intermittent (05-12-25 @ 00:52)   216 mL/Hr IV Intermittent (05-11-25 @ 18:10)   216 mL/Hr IV Intermittent (05-11-25 @ 11:12)   216 mL/Hr IV Intermittent (05-11-25 @ 06:23)   216 mL/Hr IV Intermittent (05-10-25 @ 23:50)   216 mL/Hr IV Intermittent (05-10-25 @ 18:13)    azithromycin  IVPB   255 mL/Hr IV Intermittent (05-09-25 @ 20:46)    cefTRIAXone   IVPB   100 mL/Hr IV Intermittent (05-13-25 @ 17:47)   100 mL/Hr IV Intermittent (05-13-25 @ 06:21)   100 mL/Hr IV Intermittent (05-12-25 @ 17:51)   100 mL/Hr IV Intermittent (05-12-25 @ 05:41)   100 mL/Hr IV Intermittent (05-11-25 @ 17:15)   100 mL/Hr IV Intermittent (05-11-25 @ 06:23)   100 mL/Hr IV Intermittent (05-10-25 @ 19:20)    piperacillin/tazobactam IVPB.   200 mL/Hr IV Intermittent (05-09-25 @ 18:59)    piperacillin/tazobactam IVPB.-   25 mL/Hr IV Intermittent (05-09-25 @ 23:15)    piperacillin/tazobactam IVPB..   25 mL/Hr IV Intermittent (05-10-25 @ 05:14)    vancomycin  IVPB   300 mL/Hr IV Intermittent (05-09-25 @ 21:46)            Vital Signs Last 24 Hrs  T(C): 37 (13 May 2025 14:30), Max: 37.6 (12 May 2025 22:14)  T(F): 98.6 (13 May 2025 14:30), Max: 99.7 (12 May 2025 22:14)  HR: 80 (13 May 2025 14:00) (78 - 107)  BP: --  BP(mean): --  RR: 16 (13 May 2025 14:00) (14 - 25)  SpO2: 100% (13 May 2025 14:00) (98% - 100%)    Parameters below as of 13 May 2025 14:00  Patient On (Oxygen Delivery Method): ventilator    O2 Concentration (%): 40    PHYSICAL EXAM:  Constitutional:  intubated  Eyes:  Sclerae anicteric, conjunctivae clear, PERRL  Ear/Nose/Throat:  No nasal exudate or sinus tenderness  Neck:  Supple, no adenopathy  Respiratory:  mechanical breath sounds  Cardiovascular:  RRR, S1S2, no murmur appreciated  Gastrointestinal:  Symmetric, normoactive BS, soft, NT  Extremities:  No edema      LABS:                        10.6   2.52  )-----------( 90       ( 13 May 2025 11:21 )             31.6         05-13    140  |  104  |  38[H]  ----------------------------<  152[H]  4.8   |  24  |  1.51[H]    Ca    8.2[L]      13 May 2025 11:28  Phos  4.0     05-13  Mg     2.4     05-13    TPro  5.0[L]  /  Alb  2.1[L]  /  TBili  0.3  /  DBili  x   /  AST  59[H]  /  ALT  95[H]  /  AlkPhos  71  05-13      Urinalysis Basic - ( 13 May 2025 11:28 )    Color: x / Appearance: x / SG: x / pH: x  Gluc: 152 mg/dL / Ketone: x  / Bili: x / Urobili: x   Blood: x / Protein: x / Nitrite: x   Leuk Esterase: x / RBC: x / WBC x   Sq Epi: x / Non Sq Epi: x / Bacteria: x        MICROBIOLOGY:        RADIOLOGY & ADDITIONAL STUDIES:

## 2025-05-13 NOTE — CHART NOTE - NSCHARTNOTEFT_GEN_A_CORE
Dr. Rodriguez had extensive discussion with patient and family regarding intubation. Family discussion had at bedside with Dr. Rodriguez, NORI Moncada, LUCY Nava. Patient and family requested ET tube removal. Advised that medical recommendation is to keep ET tube in place for 24 to 48 more hours for additional lung recovery given PNA and fluid overload. Patient and family reported understanding.

## 2025-05-13 NOTE — ADVANCED PRACTICE NURSE CONSULT - ASSESSMENT
79M w/PMHx of HTN, T2DM presented to [facility] with sob, generalized weakness and hypoxic resp failure found to have LANETTE with transaminitis and CT chest suggestive of multifocal pneumonia. Blood cultures growing E.fecalis with TTE concerning for thickening of AV leaflets/vegetation. He was placed on Abx and Tx to [facility] for further mgmt. Unstageable pressure injury to sacrum measuring 2.5 x 3 cm, approx 80% of wound bed is soft black eschar, remainder is non-granulating tissue. Triad ointment applied to site, covered with foam dressing. Pt being turned and repositioned with offloading pillows and feet are offloaded with [place]-Lock pillow.

## 2025-05-14 LAB
ADD ON TEST-SPECIMEN IN LAB: SIGNIFICANT CHANGE UP
ADD ON TEST-SPECIMEN IN LAB: SIGNIFICANT CHANGE UP
ALBUMIN SERPL ELPH-MCNC: 2.2 G/DL — LOW (ref 3.3–5)
ALBUMIN SERPL ELPH-MCNC: 2.4 G/DL — LOW (ref 3.3–5)
ALBUMIN SERPL ELPH-MCNC: 2.5 G/DL — LOW (ref 3.3–5)
ALBUMIN SERPL ELPH-MCNC: 2.5 G/DL — LOW (ref 3.3–5)
ALP SERPL-CCNC: 73 U/L — SIGNIFICANT CHANGE UP (ref 40–120)
ALP SERPL-CCNC: 74 U/L — SIGNIFICANT CHANGE UP (ref 40–120)
ALP SERPL-CCNC: 78 U/L — SIGNIFICANT CHANGE UP (ref 40–120)
ALP SERPL-CCNC: 79 U/L — SIGNIFICANT CHANGE UP (ref 40–120)
ALT FLD-CCNC: 73 U/L — HIGH (ref 10–45)
ALT FLD-CCNC: 78 U/L — HIGH (ref 10–45)
ALT FLD-CCNC: 79 U/L — HIGH (ref 10–45)
ALT FLD-CCNC: 81 U/L — HIGH (ref 10–45)
ANION GAP SERPL CALC-SCNC: 11 MMOL/L — SIGNIFICANT CHANGE UP (ref 5–17)
ANION GAP SERPL CALC-SCNC: 8 MMOL/L — SIGNIFICANT CHANGE UP (ref 5–17)
APTT BLD: 25.6 SEC — LOW (ref 26.1–36.8)
APTT BLD: 28.4 SEC — SIGNIFICANT CHANGE UP (ref 26.1–36.8)
APTT BLD: 29.6 SEC — SIGNIFICANT CHANGE UP (ref 26.1–36.8)
APTT BLD: 29.8 SEC — SIGNIFICANT CHANGE UP (ref 26.1–36.8)
AST SERPL-CCNC: 59 U/L — HIGH (ref 10–40)
AST SERPL-CCNC: 61 U/L — HIGH (ref 10–40)
AST SERPL-CCNC: 64 U/L — HIGH (ref 10–40)
AST SERPL-CCNC: 67 U/L — HIGH (ref 10–40)
BASOPHILS # BLD AUTO: 0 K/UL — SIGNIFICANT CHANGE UP (ref 0–0.2)
BASOPHILS # BLD AUTO: 0.01 K/UL — SIGNIFICANT CHANGE UP (ref 0–0.2)
BASOPHILS NFR BLD AUTO: 0 % — SIGNIFICANT CHANGE UP (ref 0–2)
BASOPHILS NFR BLD AUTO: 0.7 % — SIGNIFICANT CHANGE UP (ref 0–2)
BILIRUB SERPL-MCNC: 0.3 MG/DL — SIGNIFICANT CHANGE UP (ref 0.2–1.2)
BUN SERPL-MCNC: 42 MG/DL — HIGH (ref 7–23)
BUN SERPL-MCNC: 42 MG/DL — HIGH (ref 7–23)
BUN SERPL-MCNC: 44 MG/DL — HIGH (ref 7–23)
BUN SERPL-MCNC: 44 MG/DL — HIGH (ref 7–23)
CALCIUM SERPL-MCNC: 8.5 MG/DL — SIGNIFICANT CHANGE UP (ref 8.4–10.5)
CALCIUM SERPL-MCNC: 8.6 MG/DL — SIGNIFICANT CHANGE UP (ref 8.4–10.5)
CALCIUM SERPL-MCNC: 9.1 MG/DL — SIGNIFICANT CHANGE UP (ref 8.4–10.5)
CALCIUM SERPL-MCNC: 9.2 MG/DL — SIGNIFICANT CHANGE UP (ref 8.4–10.5)
CHLORIDE SERPL-SCNC: 104 MMOL/L — SIGNIFICANT CHANGE UP (ref 96–108)
CHLORIDE SERPL-SCNC: 104 MMOL/L — SIGNIFICANT CHANGE UP (ref 96–108)
CHLORIDE SERPL-SCNC: 105 MMOL/L — SIGNIFICANT CHANGE UP (ref 96–108)
CHLORIDE SERPL-SCNC: 106 MMOL/L — SIGNIFICANT CHANGE UP (ref 96–108)
CO2 SERPL-SCNC: 29 MMOL/L — SIGNIFICANT CHANGE UP (ref 22–31)
CO2 SERPL-SCNC: 30 MMOL/L — SIGNIFICANT CHANGE UP (ref 22–31)
CO2 SERPL-SCNC: 30 MMOL/L — SIGNIFICANT CHANGE UP (ref 22–31)
CO2 SERPL-SCNC: 31 MMOL/L — SIGNIFICANT CHANGE UP (ref 22–31)
CREAT SERPL-MCNC: 1.39 MG/DL — HIGH (ref 0.5–1.3)
CREAT SERPL-MCNC: 1.45 MG/DL — HIGH (ref 0.5–1.3)
CREAT SERPL-MCNC: 1.54 MG/DL — HIGH (ref 0.5–1.3)
CREAT SERPL-MCNC: 1.61 MG/DL — HIGH (ref 0.5–1.3)
EGFR: 43 ML/MIN/1.73M2 — LOW
EGFR: 43 ML/MIN/1.73M2 — LOW
EGFR: 46 ML/MIN/1.73M2 — LOW
EGFR: 46 ML/MIN/1.73M2 — LOW
EGFR: 49 ML/MIN/1.73M2 — LOW
EGFR: 49 ML/MIN/1.73M2 — LOW
EGFR: 52 ML/MIN/1.73M2 — LOW
EGFR: 52 ML/MIN/1.73M2 — LOW
EOSINOPHIL # BLD AUTO: 0.05 K/UL — SIGNIFICANT CHANGE UP (ref 0–0.5)
EOSINOPHIL # BLD AUTO: 0.08 K/UL — SIGNIFICANT CHANGE UP (ref 0–0.5)
EOSINOPHIL NFR BLD AUTO: 3.5 % — SIGNIFICANT CHANGE UP (ref 0–6)
EOSINOPHIL NFR BLD AUTO: 5.2 % — SIGNIFICANT CHANGE UP (ref 0–6)
GAS PNL BLDA: SIGNIFICANT CHANGE UP
GLUCOSE SERPL-MCNC: 146 MG/DL — HIGH (ref 70–99)
GLUCOSE SERPL-MCNC: 167 MG/DL — HIGH (ref 70–99)
GLUCOSE SERPL-MCNC: 199 MG/DL — HIGH (ref 70–99)
GLUCOSE SERPL-MCNC: 88 MG/DL — SIGNIFICANT CHANGE UP (ref 70–99)
HCT VFR BLD CALC: 31 % — LOW (ref 39–50)
HCT VFR BLD CALC: 31.4 % — LOW (ref 39–50)
HCT VFR BLD CALC: 31.7 % — LOW (ref 39–50)
HCT VFR BLD CALC: 32.7 % — LOW (ref 39–50)
HGB BLD-MCNC: 10.1 G/DL — LOW (ref 13–17)
HGB BLD-MCNC: 10.2 G/DL — LOW (ref 13–17)
HGB BLD-MCNC: 10.3 G/DL — LOW (ref 13–17)
HGB BLD-MCNC: 10.6 G/DL — LOW (ref 13–17)
IMM GRANULOCYTES NFR BLD AUTO: 0 % — SIGNIFICANT CHANGE UP (ref 0–0.9)
IMM GRANULOCYTES NFR BLD AUTO: 0.7 % — SIGNIFICANT CHANGE UP (ref 0–0.9)
INR BLD: 0.88 — SIGNIFICANT CHANGE UP (ref 0.85–1.16)
INR BLD: 0.89 — SIGNIFICANT CHANGE UP (ref 0.85–1.16)
LYMPHOCYTES # BLD AUTO: 0.45 K/UL — LOW (ref 1–3.3)
LYMPHOCYTES # BLD AUTO: 0.57 K/UL — LOW (ref 1–3.3)
LYMPHOCYTES # BLD AUTO: 31.7 % — SIGNIFICANT CHANGE UP (ref 13–44)
LYMPHOCYTES # BLD AUTO: 37.3 % — SIGNIFICANT CHANGE UP (ref 13–44)
MAGNESIUM SERPL-MCNC: 2 MG/DL — SIGNIFICANT CHANGE UP (ref 1.6–2.6)
MAGNESIUM SERPL-MCNC: 2 MG/DL — SIGNIFICANT CHANGE UP (ref 1.6–2.6)
MAGNESIUM SERPL-MCNC: 2.1 MG/DL — SIGNIFICANT CHANGE UP (ref 1.6–2.6)
MAGNESIUM SERPL-MCNC: 2.1 MG/DL — SIGNIFICANT CHANGE UP (ref 1.6–2.6)
MCHC RBC-ENTMCNC: 29.3 PG — SIGNIFICANT CHANGE UP (ref 27–34)
MCHC RBC-ENTMCNC: 29.4 PG — SIGNIFICANT CHANGE UP (ref 27–34)
MCHC RBC-ENTMCNC: 29.4 PG — SIGNIFICANT CHANGE UP (ref 27–34)
MCHC RBC-ENTMCNC: 29.5 PG — SIGNIFICANT CHANGE UP (ref 27–34)
MCHC RBC-ENTMCNC: 32.2 G/DL — SIGNIFICANT CHANGE UP (ref 32–36)
MCHC RBC-ENTMCNC: 32.4 G/DL — SIGNIFICANT CHANGE UP (ref 32–36)
MCHC RBC-ENTMCNC: 32.5 G/DL — SIGNIFICANT CHANGE UP (ref 32–36)
MCHC RBC-ENTMCNC: 32.9 G/DL — SIGNIFICANT CHANGE UP (ref 32–36)
MCV RBC AUTO: 89.3 FL — SIGNIFICANT CHANGE UP (ref 80–100)
MCV RBC AUTO: 90.6 FL — SIGNIFICANT CHANGE UP (ref 80–100)
MCV RBC AUTO: 90.8 FL — SIGNIFICANT CHANGE UP (ref 80–100)
MCV RBC AUTO: 91 FL — SIGNIFICANT CHANGE UP (ref 80–100)
MONOCYTES # BLD AUTO: 0.15 K/UL — SIGNIFICANT CHANGE UP (ref 0–0.9)
MONOCYTES # BLD AUTO: 0.18 K/UL — SIGNIFICANT CHANGE UP (ref 0–0.9)
MONOCYTES NFR BLD AUTO: 12.7 % — SIGNIFICANT CHANGE UP (ref 2–14)
MONOCYTES NFR BLD AUTO: 9.8 % — SIGNIFICANT CHANGE UP (ref 2–14)
NEUTROPHILS # BLD AUTO: 0.71 K/UL — LOW (ref 1.8–7.4)
NEUTROPHILS # BLD AUTO: 0.74 K/UL — LOW (ref 1.8–7.4)
NEUTROPHILS NFR BLD AUTO: 46.3 % — SIGNIFICANT CHANGE UP (ref 43–77)
NEUTROPHILS NFR BLD AUTO: 52.1 % — SIGNIFICANT CHANGE UP (ref 43–77)
NRBC BLD AUTO-RTO: 0 /100 WBCS — SIGNIFICANT CHANGE UP (ref 0–0)
PHOSPHATE SERPL-MCNC: 3.6 MG/DL — SIGNIFICANT CHANGE UP (ref 2.5–4.5)
PHOSPHATE SERPL-MCNC: 3.8 MG/DL — SIGNIFICANT CHANGE UP (ref 2.5–4.5)
PHOSPHATE SERPL-MCNC: 4.1 MG/DL — SIGNIFICANT CHANGE UP (ref 2.5–4.5)
PHOSPHATE SERPL-MCNC: 4.2 MG/DL — SIGNIFICANT CHANGE UP (ref 2.5–4.5)
PLATELET # BLD AUTO: 106 K/UL — LOW (ref 150–400)
PLATELET # BLD AUTO: 112 K/UL — LOW (ref 150–400)
PLATELET # BLD AUTO: 113 K/UL — LOW (ref 150–400)
PLATELET # BLD AUTO: 117 K/UL — LOW (ref 150–400)
POTASSIUM SERPL-MCNC: 3.8 MMOL/L — SIGNIFICANT CHANGE UP (ref 3.5–5.3)
POTASSIUM SERPL-MCNC: 3.8 MMOL/L — SIGNIFICANT CHANGE UP (ref 3.5–5.3)
POTASSIUM SERPL-MCNC: 3.9 MMOL/L — SIGNIFICANT CHANGE UP (ref 3.5–5.3)
POTASSIUM SERPL-MCNC: 3.9 MMOL/L — SIGNIFICANT CHANGE UP (ref 3.5–5.3)
POTASSIUM SERPL-SCNC: 3.8 MMOL/L — SIGNIFICANT CHANGE UP (ref 3.5–5.3)
POTASSIUM SERPL-SCNC: 3.8 MMOL/L — SIGNIFICANT CHANGE UP (ref 3.5–5.3)
POTASSIUM SERPL-SCNC: 3.9 MMOL/L — SIGNIFICANT CHANGE UP (ref 3.5–5.3)
POTASSIUM SERPL-SCNC: 3.9 MMOL/L — SIGNIFICANT CHANGE UP (ref 3.5–5.3)
PROT SERPL-MCNC: 5.4 G/DL — LOW (ref 6–8.3)
PROT SERPL-MCNC: 5.5 G/DL — LOW (ref 6–8.3)
PROT SERPL-MCNC: 5.7 G/DL — LOW (ref 6–8.3)
PROT SERPL-MCNC: 5.7 G/DL — LOW (ref 6–8.3)
PROTHROM AB SERPL-ACNC: 10.2 SEC — SIGNIFICANT CHANGE UP (ref 9.9–13.4)
PROTHROM AB SERPL-ACNC: 10.3 SEC — SIGNIFICANT CHANGE UP (ref 9.9–13.4)
PROTHROM AB SERPL-ACNC: 10.4 SEC — SIGNIFICANT CHANGE UP (ref 9.9–13.4)
PROTHROM AB SERPL-ACNC: 10.4 SEC — SIGNIFICANT CHANGE UP (ref 9.9–13.4)
RBC # BLD: 3.45 M/UL — LOW (ref 4.2–5.8)
RBC # BLD: 3.47 M/UL — LOW (ref 4.2–5.8)
RBC # BLD: 3.49 M/UL — LOW (ref 4.2–5.8)
RBC # BLD: 3.61 M/UL — LOW (ref 4.2–5.8)
RBC # FLD: 18.5 % — HIGH (ref 10.3–14.5)
RBC # FLD: 18.5 % — HIGH (ref 10.3–14.5)
RBC # FLD: 18.6 % — HIGH (ref 10.3–14.5)
RBC # FLD: 18.6 % — HIGH (ref 10.3–14.5)
SODIUM SERPL-SCNC: 142 MMOL/L — SIGNIFICANT CHANGE UP (ref 135–145)
SODIUM SERPL-SCNC: 144 MMOL/L — SIGNIFICANT CHANGE UP (ref 135–145)
SODIUM SERPL-SCNC: 147 MMOL/L — HIGH (ref 135–145)
SODIUM SERPL-SCNC: 147 MMOL/L — HIGH (ref 135–145)
WBC # BLD: 1.42 K/UL — LOW (ref 3.8–10.5)
WBC # BLD: 1.53 K/UL — LOW (ref 3.8–10.5)
WBC # BLD: 1.66 K/UL — LOW (ref 3.8–10.5)
WBC # BLD: 2.11 K/UL — LOW (ref 3.8–10.5)
WBC # FLD AUTO: 1.42 K/UL — LOW (ref 3.8–10.5)
WBC # FLD AUTO: 1.53 K/UL — LOW (ref 3.8–10.5)
WBC # FLD AUTO: 1.66 K/UL — LOW (ref 3.8–10.5)
WBC # FLD AUTO: 2.11 K/UL — LOW (ref 3.8–10.5)

## 2025-05-14 PROCEDURE — 99291 CRITICAL CARE FIRST HOUR: CPT

## 2025-05-14 PROCEDURE — 99232 SBSQ HOSP IP/OBS MODERATE 35: CPT

## 2025-05-14 PROCEDURE — G0545: CPT

## 2025-05-14 PROCEDURE — 71045 X-RAY EXAM CHEST 1 VIEW: CPT | Mod: 26

## 2025-05-14 PROCEDURE — 99292 CRITICAL CARE ADDL 30 MIN: CPT

## 2025-05-14 RX ORDER — BUMETANIDE 1 MG/1
1 TABLET ORAL ONCE
Refills: 0 | Status: COMPLETED | OUTPATIENT
Start: 2025-05-14 | End: 2025-05-14

## 2025-05-14 RX ORDER — FILGRASTIM 300 UG/.5ML
480 INJECTION, SOLUTION INTRAVENOUS; SUBCUTANEOUS ONCE
Refills: 0 | Status: COMPLETED | OUTPATIENT
Start: 2025-05-14 | End: 2025-05-14

## 2025-05-14 RX ORDER — MELATONIN 5 MG
5 TABLET ORAL AT BEDTIME
Refills: 0 | Status: DISCONTINUED | OUTPATIENT
Start: 2025-05-14 | End: 2025-05-16

## 2025-05-14 RX ORDER — ACETAMINOPHEN 500 MG/5ML
1000 LIQUID (ML) ORAL ONCE
Refills: 0 | Status: COMPLETED | OUTPATIENT
Start: 2025-05-14 | End: 2025-05-14

## 2025-05-14 RX ORDER — ESCITALOPRAM OXALATE 20 MG/1
10 TABLET ORAL AT BEDTIME
Refills: 0 | Status: DISCONTINUED | OUTPATIENT
Start: 2025-05-14 | End: 2025-05-16

## 2025-05-14 RX ORDER — CALCIUM GLUCONATE 20 MG/ML
2 INJECTION, SOLUTION INTRAVENOUS ONCE
Refills: 0 | Status: COMPLETED | OUTPATIENT
Start: 2025-05-14 | End: 2025-05-14

## 2025-05-14 RX ADMIN — CEFTRIAXONE 100 MILLIGRAM(S): 500 INJECTION, POWDER, FOR SOLUTION INTRAMUSCULAR; INTRAVENOUS at 05:16

## 2025-05-14 RX ADMIN — Medication 40 MILLIGRAM(S): at 11:01

## 2025-05-14 RX ADMIN — Medication 400 MILLIGRAM(S): at 07:50

## 2025-05-14 RX ADMIN — Medication 1000 MILLIGRAM(S): at 08:50

## 2025-05-14 RX ADMIN — AMPICILLIN SODIUM 216 GRAM(S): 1 INJECTION, POWDER, FOR SOLUTION INTRAMUSCULAR; INTRAVENOUS at 23:28

## 2025-05-14 RX ADMIN — Medication 20 MILLIEQUIVALENT(S): at 23:28

## 2025-05-14 RX ADMIN — CALCIUM GLUCONATE 200 GRAM(S): 20 INJECTION, SOLUTION INTRAVENOUS at 12:17

## 2025-05-14 RX ADMIN — DOBUTAMINE 4.48 MICROGRAM(S)/KG/MIN: 250 INJECTION INTRAVENOUS at 00:34

## 2025-05-14 RX ADMIN — Medication 81 MILLIGRAM(S): at 11:00

## 2025-05-14 RX ADMIN — FILGRASTIM 480 MICROGRAM(S): 300 INJECTION, SOLUTION INTRAVENOUS; SUBCUTANEOUS at 17:06

## 2025-05-14 RX ADMIN — Medication 5 MILLIGRAM(S): at 21:17

## 2025-05-14 RX ADMIN — Medication 100 MILLIEQUIVALENT(S): at 06:48

## 2025-05-14 RX ADMIN — HEPARIN SODIUM 5000 UNIT(S): 1000 INJECTION INTRAVENOUS; SUBCUTANEOUS at 05:16

## 2025-05-14 RX ADMIN — Medication 3 MILLILITER(S): at 21:00

## 2025-05-14 RX ADMIN — AMPICILLIN SODIUM 216 GRAM(S): 1 INJECTION, POWDER, FOR SOLUTION INTRAMUSCULAR; INTRAVENOUS at 11:00

## 2025-05-14 RX ADMIN — Medication 20 MILLIEQUIVALENT(S): at 18:23

## 2025-05-14 RX ADMIN — CEFTRIAXONE 100 MILLIGRAM(S): 500 INJECTION, POWDER, FOR SOLUTION INTRAMUSCULAR; INTRAVENOUS at 17:28

## 2025-05-14 RX ADMIN — Medication 3 MILLILITER(S): at 06:00

## 2025-05-14 RX ADMIN — BUMETANIDE 1 MILLIGRAM(S): 1 TABLET ORAL at 07:50

## 2025-05-14 RX ADMIN — AMPICILLIN SODIUM 216 GRAM(S): 1 INJECTION, POWDER, FOR SOLUTION INTRAMUSCULAR; INTRAVENOUS at 05:16

## 2025-05-14 RX ADMIN — HEPARIN SODIUM 5000 UNIT(S): 1000 INJECTION INTRAVENOUS; SUBCUTANEOUS at 21:17

## 2025-05-14 RX ADMIN — HEPARIN SODIUM 5000 UNIT(S): 1000 INJECTION INTRAVENOUS; SUBCUTANEOUS at 13:20

## 2025-05-14 RX ADMIN — Medication 3 MILLILITER(S): at 13:11

## 2025-05-14 RX ADMIN — ESCITALOPRAM OXALATE 10 MILLIGRAM(S): 20 TABLET ORAL at 21:18

## 2025-05-14 RX ADMIN — AMPICILLIN SODIUM 216 GRAM(S): 1 INJECTION, POWDER, FOR SOLUTION INTRAMUSCULAR; INTRAVENOUS at 17:07

## 2025-05-14 NOTE — DIETITIAN NUTRITION RISK NOTIFICATION - TREATMENT: THE FOLLOWING DIET HAS BEEN RECOMMENDED
1. Continue enteral nutrition via nasogastric tube. Recommend Glucerna 1.2 at 70mL/hr x 24 hours. This provides 1680mL total volume, 2016kcal, 101g protein, 1352mL free water. Meeting 28kcal and 1.4g protein based on IBW 72.5kg.   2. If cleared for PO, recommend Consistent Carbohydrate diet with textures per team/SLP.  >>Recommend to continue enteral nutrition until pt can consistently meet 75-80% of needs via PO. Consider transition to cyclic feeds to promote appetite/intake at meal times: Glucerna 1.2 at 105mL/hr x 16 hours.   3. Monitor wt trends, GI function, skin integrity.  4. Monitor lytes, renal indices, blood glucose, LFTs.    5. Pain and bowel regimen per team.

## 2025-05-14 NOTE — PROGRESS NOTE ADULT - ASSESSMENT
79M w/ PMHx of DM, HTN, HLD, hx of hospitalization in [place] (1/2025, 2/2025) for sepsis 2/2 acute   cholecystitis (s/p cholecystectomy), c/b postsurgical hematoma, b/l pleural effusions s/p thoracentesis of R pleural effusion on 3/3/25 at [facility] [pleural fluid and BCx neg at the time].  Pt son is an ED RN at [place] and tells me that pt had Enterococcal bacteremia while he was hospitalized in [place]. Source felt to be GI/cholecystitis at the time. Unsure of what Abx were given back then and for how long.   He subsequently went to rehab and eventually returned to [facility]. Was taken to [facility] for weakness and dyspnea x 2 wks with significant worsening 2d pta.  There he denied fever, chills, chest pain, nausea, vomiting, abdominal pain, diarrhea, dysuria; reported decreased   urination. There, he was tachypneic with O2 sats in the 80s on RA and low 90s on 4L O2 via NC. CXR with fluid overload vs pneumonia. WBC WNL with N predom, UA neg, UCx w <10k UGF.   CT c/a/p showed Interval development of multifocal central consolidative opacities throughout the lungs, particularly the upper lobes and left lower lobe, R pleural effusion was noted to have dec in size, new small L pleural effusion (complex per HIE crit care note), significant interval decrease in size of the subcapsular hematoma along the inferior aspect of the liver.  Patient reportedly met sepsis criteria and was admitted to the ICU for sepsis and was in resp distress. Was given Vanc/Piptazo/Azithro + stress steroids. Was on bipap.   5/8 BCx + E fecalis (S- Amp, Vanco).   5/9 BCx + (4/4) w E fecalis.   5/10 BCx + E fecalis  5/11 BCx ngtd  5/12 BCx ngtd  5/13 BCx ngtd  Echo with aortic valve vegetations, EF 40-45%  Xferred to [facility] for possible surgical mgmt of AoV endocarditis and critical care.  Intubated at [facility], on pressors. Extubated yesterday.     #Enterococcal bacteremia and AoV (NV) endocarditis  - F/u surveillance BCx  - SHELLY when able  - Cont IV Ampicillin 2 gm q6h (renally adjusted)  - Cont IV Ceftriaxone 2gm q12h   - D/w pt and family reg alternate E fecalis IE regimen - they do not want to risk renal injury using an aminoglycoside; being on HD is not within his goals of care  - D/w Dr. Rodriguez reg declining WBC count which could be d/t beta lactam BM suppression (likely ampicillin); will trend counts for now  - Pt was given filgrastim today    # Loose stools  - Check C diff    ID Team 1 will follow

## 2025-05-14 NOTE — SWALLOW BEDSIDE ASSESSMENT ADULT - SWALLOW EVAL: PROGNOSIS
Poor d/t h/o multiple medical problems, deconditioned status and plan for no escalation of medical care

## 2025-05-14 NOTE — CHART NOTE - NSCHARTNOTEFT_GEN_A_CORE
Admitting Diagnosis:   Patient is a 79y old  Male who presents with a chief complaint of AORTIC VALVE ENDOCARDITI    AORTIC VALVE ENDOCARDITIS     (11 May 2025 18:34)      PAST MEDICAL & SURGICAL HISTORY:  HTN (hypertension)  HLD (hyperlipidemia)  Pneumonia  Aortic valve endocarditis  H/O appendicostomy  History of cholecystectomy          Current Nutrition Order:  NPO with tube feed via nasogastric tube: Glucerna 1.5 at 55mL/hr x 24 hours. This provides 1320mL total volume, 1980kcal, 110g protein, 1002mL free water.     PO Intake: Good (%) [   ]  Fair (50-75%) [   ] Poor (<25%) [   ] NPO w/ Tube Feeds [ X ]    GI Issues: no report of nausea, vomiting, diarrhea, constipation; +BM 5/13 per flowsheets     Pain: none noted     Skin Integrity:  Unstageable sacral pressure injury, 1+ generalized edema noted   Zacarias score 13      05-13-25 @ 07:01  -  05-14-25 @ 07:00  --------------------------------------------------------  IN: 1928.8 mL / OUT: 2915 mL / NET: -986.3 mL    05-14-25 @ 07:01  -  05-14-25 @ 12:27  --------------------------------------------------------  IN: 312 mL / OUT: 670 mL / NET: -358 mL        Labs:   05-14    142  |  104  |  42[H]  ----------------------------<  167[H]  3.8   |  30  |  1.54[H]    Ca    8.5      14 May 2025 10:47  Phos  3.8     05-14  Mg     2.0     05-14    TPro  5.4[L]  /  Alb  2.2[L]  /  TBili  0.3  /  DBili  x   /  AST  64[H]  /  ALT  81[H]  /  AlkPhos  78  05-14    CAPILLARY BLOOD GLUCOSE      POCT Blood Glucose.: 133 mg/dL (14 May 2025 10:58)  POCT Blood Glucose.: 152 mg/dL (14 May 2025 10:21)  POCT Blood Glucose.: 169 mg/dL (14 May 2025 09:18)  POCT Blood Glucose.: 157 mg/dL (14 May 2025 08:12)  POCT Blood Glucose.: 137 mg/dL (14 May 2025 07:06)  POCT Blood Glucose.: 129 mg/dL (14 May 2025 06:18)  POCT Blood Glucose.: 130 mg/dL (14 May 2025 04:59)  POCT Blood Glucose.: 107 mg/dL (14 May 2025 04:04)  POCT Blood Glucose.: 80 mg/dL (14 May 2025 03:11)  POCT Blood Glucose.: 93 mg/dL (14 May 2025 02:13)  POCT Blood Glucose.: 102 mg/dL (14 May 2025 01:05)  POCT Blood Glucose.: 122 mg/dL (14 May 2025 00:13)  POCT Blood Glucose.: 135 mg/dL (13 May 2025 23:09)  POCT Blood Glucose.: 157 mg/dL (13 May 2025 22:10)  POCT Blood Glucose.: 154 mg/dL (13 May 2025 21:07)  POCT Blood Glucose.: 168 mg/dL (13 May 2025 20:00)  POCT Blood Glucose.: 152 mg/dL (13 May 2025 18:52)  POCT Blood Glucose.: 169 mg/dL (13 May 2025 17:52)  POCT Blood Glucose.: 161 mg/dL (13 May 2025 15:54)      Medications:  MEDICATIONS  (STANDING):  ampicillin  IVPB      ampicillin  IVPB 2 Gram(s) IV Intermittent every 6 hours  aspirin  chewable 81 milliGRAM(s) Oral daily  buMETAnide Infusion 0.25 mG/Hr (1.25 mL/Hr) IV Continuous <Continuous>  cefTRIAXone   IVPB 2000 milliGRAM(s) IV Intermittent every 12 hours  chlorhexidine 2% Cloths 1 Application(s) Topical <User Schedule>  dextrose 5%. 1000 milliLiter(s) (50 mL/Hr) IV Continuous <Continuous>  dextrose 5%. 1000 milliLiter(s) (100 mL/Hr) IV Continuous <Continuous>  dextrose 50% Injectable 50 milliLiter(s) IV Push every 15 minutes  DOBUTamine Infusion 1 MICROgram(s)/kG/Min (2.24 mL/Hr) IV Continuous <Continuous>  escitalopram 10 milliGRAM(s) Oral at bedtime  glucagon  Injectable 1 milliGRAM(s) IntraMuscular once  heparin   Injectable 5000 Unit(s) SubCutaneous every 8 hours  insulin regular Infusion 5 Unit(s)/Hr (5 mL/Hr) IV Continuous <Continuous>  melatonin 5 milliGRAM(s) Oral at bedtime  pantoprazole  Injectable 40 milliGRAM(s) IV Push daily  polyethylene glycol 3350 17 Gram(s) Oral every 24 hours  senna 2 Tablet(s) Oral at bedtime  sodium chloride 0.9% lock flush 3 milliLiter(s) IV Push every 8 hours    MEDICATIONS  (PRN):  dextrose Oral Gel 15 Gram(s) Oral once PRN Blood Glucose LESS THAN 70 milliGRAM(s)/deciliter      Anthropometrics:  Dosing height: 69in  Dosing weight: 74.7kg/164.7 pounds   BMI 24.3    pounds, %    Weight Change: no new weights since admit, recommend nursing to obtain weekly weights; RD to monitor trends as able.     Moderate Protein Calorie Malnutrition: Risk Notification Completed 5/14   -PO intake: meeting <75% EER >1 month  -Weight loss: 8% weight loss x 5 months   -NFPE: moderate muscle and fat wasting     Estimated energy needs:   Weight used for calculations	IBW  Estimated Energy Needs Weight (lbs)	160 lb  Estimated Energy Needs Weight (kg)	72.5 kg  Estimated Energy Needs From (alba/kg)	27  Estimated Energy Needs To (alba/kg)	32  Estimated Energy Needs Calculated From (alba/kg)	1958  Estimated Energy Needs Calculated To (alba/kg)	2320    Weight used for calculations	IBW  Estimated Protein Needs Weight (lbs)	160 lb  Estimated Protein Needs Weight (kg)	72.5 kg  Estimated Protein Needs From (g/kg)	1.2  Estimated Protein Needs To (g/kg)	1.5  Estimated Protein Needs Calculated From (g/kg)	87  Estimated Protein Needs Calculated To (g/kg)	108.75    Estimated Fluid Needs Weight (lbs)	160 lb  Estimated Fluid Needs Weight (kg)	72.5 kg  Estimated Fluid Needs From (ml/kg)	30  Estimated Fluid Needs To (ml/kg)	35  Estimated Fluid Needs Calculated From (ml/kg)	2175  Estimated Fluid Needs Calculated To (ml/kg)	2537    IBW for EER as %IBW>100% in ICU setting. Needs adjusted for advanced age, clinical course, wound healing, malnutrition.     Subjective:   79 year old male with Aortic valve endocarditis, pt has a past medical history significant for HTN, HLD, Type two diabetes, acute cholecystectomy complicated by pleural effusions requiring drainage who presented to [facility] with sob and weakness x 2 weeks. In ED, he was found to have elevated troponins, Cr 1.9 (baseline 0.75) [place] [address] and transaminitis. He was hypoxic in ED with CT chest showing multifocal PNA and acute respiratory failure. He was placed on HFNC and transferred to ICU. Blood cultures were + for gram positive cocci. TTE showed reduce LVEF with thickening of AV leaflets concerning for vegetation. Steroids were started. He was placed on Vanc/Zosyn/Azithromycin and is now transferred to [facility] for management. Family discussion- Do NOT want to proceed with operation, pursuing medical mgmt at this point. Extubated after long discussion.    Pt and wife seen for nutrition assessment. Pt resting in bed on high flow nasal cannula, MAP 66, /38, on bumetanide, dobutamine, and insulin gtt. Pt remains NPO with tube feeds pending SLP evaluation. Observed tube feeds running at 55mL/hr at time of assessment, tolerating well with no report of nausea, vomiting, diarrhea, constipation, abdominal pain. Pt and wife report decreased appetite PTA in setting of multiple recent hospitalizations. Report a usual body weight of 180 pounds at the end of December 2024. Current dosing weight is 165 pounds which suggests 15 pound/8% weight loss x 5 months, clinically significant. Able to visualize moderate muscle and fat wasting to clavicles and orbitals upon assessment today. Based on ASPEN guidelines, pt meets criteria for moderate protein calorie malnutrition- risk notification placed. Labs and medications reviewed. Electrolytes WNL, BUN/Cr 42/1.54<high>, eGFR 46<low>, glucose  x 24 hours, HgbA1c 8.5%<high> (5/9/25). Ordered for IV abx, protonix, miralax, senna. See nutrition recommendations below.     Previous Nutrition Diagnosis: Inadequate Oral Intake related to inability to meet nutritional demands via PO as evidenced by NPO with alternate means of nutrition via tube feedings    Active [   ]  Resolved [ X ]    If resolved, new PES: Moderate protein calorie malnutrition in context of chronic illness related to decreased appetite as evidenced by meeting <75% EER >1 month, moderate muscle and fat wasting     Goal:  Pt to meet at least 75% of nutritional needs consistently via most appropriate route for nutrition  Reduce signs and symptoms of protein calorie malnutrition     Recommendations:  1. Continue enteral nutrition via nasogastric tube. Recommend Glucerna 1.2 at 70mL/hr x 24 hours. This provides 1680mL total volume, 2016kcal, 101g protein, 1352mL free water. Meeting 28kcal and 1.4g protein based on IBW 72.5kg.   2. If cleared for PO, recommend Consistent Carbohydrate diet with textures per team/SLP.  >>Recommend to continue enteral nutrition until pt can consistently meet 75-80% of needs via PO. Consider transition to cyclic feeds to promote appetite/intake at meal times: Glucerna 1.2 at 105mL/hr x 16 hours.   3. Monitor wt trends, GI function, skin integrity.  4. Monitor lytes, renal indices, blood glucose, LFTs.    5. Pain and bowel regimen per team.     Education: RD discussed continued use of enteral nutrition until pt can meet needs via PO. Pt and wife verbalized understanding.     RD to remain available for additional nutrition interventions as needed. Admitting Diagnosis:   Patient is a 79y old  Male who presents with a chief complaint of AORTIC VALVE ENDOCARDITI    AORTIC VALVE ENDOCARDITIS     (11 May 2025 18:34)      PAST MEDICAL & SURGICAL HISTORY:  HTN (hypertension)  HLD (hyperlipidemia)  Pneumonia  Aortic valve endocarditis  H/O appendicostomy  History of cholecystectomy          Current Nutrition Order:  NPO with tube feed via nasogastric tube: Glucerna 1.5 at 55mL/hr x 24 hours. This provides 1320mL total volume, 1980kcal, 110g protein, 1002mL free water.     PO Intake: Good (%) [   ]  Fair (50-75%) [   ] Poor (<25%) [   ] NPO w/ Tube Feeds [ X ]    GI Issues: no report of nausea, vomiting, diarrhea, constipation; +BM 5/13 per flowsheets     Pain: none noted     Skin Integrity:  Unstageable sacral pressure injury, 1+ generalized edema noted   Zacarias score 13      05-13-25 @ 07:01  -  05-14-25 @ 07:00  --------------------------------------------------------  IN: 1928.8 mL / OUT: 2915 mL / NET: -986.3 mL    05-14-25 @ 07:01  -  05-14-25 @ 12:27  --------------------------------------------------------  IN: 312 mL / OUT: 670 mL / NET: -358 mL        Labs:   05-14    142  |  104  |  42[H]  ----------------------------<  167[H]  3.8   |  30  |  1.54[H]    Ca    8.5      14 May 2025 10:47  Phos  3.8     05-14  Mg     2.0     05-14    TPro  5.4[L]  /  Alb  2.2[L]  /  TBili  0.3  /  DBili  x   /  AST  64[H]  /  ALT  81[H]  /  AlkPhos  78  05-14    CAPILLARY BLOOD GLUCOSE      POCT Blood Glucose.: 133 mg/dL (14 May 2025 10:58)  POCT Blood Glucose.: 152 mg/dL (14 May 2025 10:21)  POCT Blood Glucose.: 169 mg/dL (14 May 2025 09:18)  POCT Blood Glucose.: 157 mg/dL (14 May 2025 08:12)  POCT Blood Glucose.: 137 mg/dL (14 May 2025 07:06)  POCT Blood Glucose.: 129 mg/dL (14 May 2025 06:18)  POCT Blood Glucose.: 130 mg/dL (14 May 2025 04:59)  POCT Blood Glucose.: 107 mg/dL (14 May 2025 04:04)  POCT Blood Glucose.: 80 mg/dL (14 May 2025 03:11)  POCT Blood Glucose.: 93 mg/dL (14 May 2025 02:13)  POCT Blood Glucose.: 102 mg/dL (14 May 2025 01:05)  POCT Blood Glucose.: 122 mg/dL (14 May 2025 00:13)  POCT Blood Glucose.: 135 mg/dL (13 May 2025 23:09)  POCT Blood Glucose.: 157 mg/dL (13 May 2025 22:10)  POCT Blood Glucose.: 154 mg/dL (13 May 2025 21:07)  POCT Blood Glucose.: 168 mg/dL (13 May 2025 20:00)  POCT Blood Glucose.: 152 mg/dL (13 May 2025 18:52)  POCT Blood Glucose.: 169 mg/dL (13 May 2025 17:52)  POCT Blood Glucose.: 161 mg/dL (13 May 2025 15:54)      Medications:  MEDICATIONS  (STANDING):  ampicillin  IVPB      ampicillin  IVPB 2 Gram(s) IV Intermittent every 6 hours  aspirin  chewable 81 milliGRAM(s) Oral daily  buMETAnide Infusion 0.25 mG/Hr (1.25 mL/Hr) IV Continuous <Continuous>  cefTRIAXone   IVPB 2000 milliGRAM(s) IV Intermittent every 12 hours  chlorhexidine 2% Cloths 1 Application(s) Topical <User Schedule>  dextrose 5%. 1000 milliLiter(s) (50 mL/Hr) IV Continuous <Continuous>  dextrose 5%. 1000 milliLiter(s) (100 mL/Hr) IV Continuous <Continuous>  dextrose 50% Injectable 50 milliLiter(s) IV Push every 15 minutes  DOBUTamine Infusion 1 MICROgram(s)/kG/Min (2.24 mL/Hr) IV Continuous <Continuous>  escitalopram 10 milliGRAM(s) Oral at bedtime  glucagon  Injectable 1 milliGRAM(s) IntraMuscular once  heparin   Injectable 5000 Unit(s) SubCutaneous every 8 hours  insulin regular Infusion 5 Unit(s)/Hr (5 mL/Hr) IV Continuous <Continuous>  melatonin 5 milliGRAM(s) Oral at bedtime  pantoprazole  Injectable 40 milliGRAM(s) IV Push daily  polyethylene glycol 3350 17 Gram(s) Oral every 24 hours  senna 2 Tablet(s) Oral at bedtime  sodium chloride 0.9% lock flush 3 milliLiter(s) IV Push every 8 hours    MEDICATIONS  (PRN):  dextrose Oral Gel 15 Gram(s) Oral once PRN Blood Glucose LESS THAN 70 milliGRAM(s)/deciliter      Anthropometrics:  Dosing height: 69in  Dosing weight: 74.7kg/164.7 pounds   BMI 24.3    pounds, %    Weight Change: no new weights since admit, recommend nursing to obtain weekly weights; RD to monitor trends as able.     Moderate Protein Calorie Malnutrition: Risk Notification Completed 5/14   -PO intake: meeting <75% EER >1 month  -Weight loss: 8% weight loss x 5 months   -NFPE: moderate muscle and fat wasting     Estimated energy needs:   Weight used for calculations	IBW  Estimated Energy Needs Weight (lbs)	160 lb  Estimated Energy Needs Weight (kg)	72.5 kg  Estimated Energy Needs From (alba/kg)	27  Estimated Energy Needs To (alba/kg)	32  Estimated Energy Needs Calculated From (alba/kg)	1958  Estimated Energy Needs Calculated To (alba/kg)	2320    Weight used for calculations	IBW  Estimated Protein Needs Weight (lbs)	160 lb  Estimated Protein Needs Weight (kg)	72.5 kg  Estimated Protein Needs From (g/kg)	1.2  Estimated Protein Needs To (g/kg)	1.5  Estimated Protein Needs Calculated From (g/kg)	87  Estimated Protein Needs Calculated To (g/kg)	108.75    Estimated Fluid Needs Weight (lbs)	160 lb  Estimated Fluid Needs Weight (kg)	72.5 kg  Estimated Fluid Needs From (ml/kg)	30  Estimated Fluid Needs To (ml/kg)	35  Estimated Fluid Needs Calculated From (ml/kg)	2175  Estimated Fluid Needs Calculated To (ml/kg)	2537    IBW for EER as %IBW>100% in ICU setting. Needs adjusted for advanced age, clinical course, wound healing, malnutrition.     Subjective:   79 year old male with Aortic valve endocarditis, pt has a past medical history significant for HTN, HLD, Type two diabetes, acute cholecystectomy complicated by pleural effusions requiring drainage who presented to [facility] with sob and weakness x 2 weeks. In ED, he was found to have elevated troponins, Cr 1.9 (baseline 0.75) [place] [address] and transaminitis. He was hypoxic in ED with CT chest showing multifocal PNA and acute respiratory failure. He was placed on HFNC and transferred to ICU. Blood cultures were + for gram positive cocci. TTE showed reduce LVEF with thickening of AV leaflets concerning for vegetation. Steroids were started. He was placed on Vanc/Zosyn/Azithromycin and is now transferred to [facility] for management. Family discussion- Do NOT want to proceed with operation, pursuing medical mgmt at this point. Extubated after long discussion.    Pt and wife seen for nutrition assessment. Pt resting in bed on high flow nasal cannula, MAP 66, /38, on bumetanide, dobutamine, and insulin gtt. Pt remains NPO with tube feeds pending SLP evaluation. Observed tube feeds running at 55mL/hr at time of assessment, tolerating well with no report of nausea, vomiting, diarrhea, constipation, abdominal pain. Pt and wife report decreased appetite PTA in setting of multiple recent hospitalizations. Report a usual body weight of 180 pounds at the end of December 2024. Current dosing weight is 165 pounds which suggests 15 pound/8% weight loss x 5 months, clinically significant. Able to visualize moderate muscle and fat wasting to clavicles and orbitals upon assessment today. Based on ASPEN guidelines, pt meets criteria for moderate protein calorie malnutrition- risk notification placed. Labs and medications reviewed. Electrolytes WNL, BUN/Cr 42/1.54<high>, eGFR 46<low>, glucose  x 24 hours, HgbA1c 8.5%<high> (5/9/25). Ordered for IV abx, protonix, miralax, senna. See nutrition recommendations below.     Previous Nutrition Diagnosis: Inadequate Oral Intake related to inability to meet nutritional demands via PO as evidenced by NPO with alternate means of nutrition via tube feedings    Active [   ]  Resolved [ X ]    If resolved, new PES: Moderate protein calorie malnutrition in context of chronic illness related to decreased appetite as evidenced by meeting <75% EER >1 month, 8% weight loss x 5 months, moderate muscle and fat wasting     Goal:  Pt to meet at least 75% of nutritional needs consistently via most appropriate route for nutrition  Reduce signs and symptoms of protein calorie malnutrition     Recommendations:  1. Continue enteral nutrition via nasogastric tube. Recommend Glucerna 1.2 at 70mL/hr x 24 hours. This provides 1680mL total volume, 2016kcal, 101g protein, 1352mL free water. Meeting 28kcal and 1.4g protein based on IBW 72.5kg.   2. If cleared for PO, recommend Consistent Carbohydrate diet with textures per team/SLP.  >>Recommend to continue enteral nutrition until pt can consistently meet 75-80% of needs via PO. Consider transition to cyclic feeds to promote appetite/intake at meal times: Glucerna 1.2 at 105mL/hr x 16 hours.   3. Monitor wt trends, GI function, skin integrity.  4. Monitor lytes, renal indices, blood glucose, LFTs.    5. Pain and bowel regimen per team.     Education: RD discussed continued use of enteral nutrition until pt can meet needs via PO. Pt and wife verbalized understanding.     RD to remain available for additional nutrition interventions as needed.

## 2025-05-14 NOTE — SWALLOW BEDSIDE ASSESSMENT ADULT - NS SPL SWALLOW CLINIC TRIAL FT
Pt with subtle and inconsistent signs of penetration and/or aspiration with consecutive cup sips of water, suspect d/t incomplete airway closure. He would benefit from taking small, isolated cup sips or spoon sips of liquids to reduce risk of aspiration. Pt and family were educated on safe eating/drinking strategies and are in agreement with recs.

## 2025-05-14 NOTE — SWALLOW BEDSIDE ASSESSMENT ADULT - PHARYNGEAL PHASE
Hyolaryngeal excursion palpated during swallow trigger. Multiple swallows noted per sip & throat clear noted after initial tsp sip and after consecutive cup sips of thin liquid indicating airway protection deficits.

## 2025-05-14 NOTE — PROGRESS NOTE ADULT - SUBJECTIVE AND OBJECTIVE BOX
INTERVAL HPI/OVERNIGHT EVENTS: C/o loose stools x 3    CONSTITUTIONAL:  No fever, chills, night sweats  EYES:  No photophobia or visual changes  CARDIOVASCULAR:  No chest pain  RESPIRATORY:  No cough, wheezing, or SOB   GASTROINTESTINAL:  No nausea, vomiting, + diarrhea, no constipation, or abdominal pain  GENITOURINARY:  No frequency, urgency, dysuria or hematuria  NEUROLOGIC:  No headache, lightheadedness      ANTIBIOTICS/RELEVANT:          Vital Signs Last 24 Hrs  T(C): 36 (15 May 2025 01:01), Max: 37.4 (14 May 2025 05:24)  T(F): 96.8 (15 May 2025 01:01), Max: 99.4 (14 May 2025 05:24)  HR: 90 (15 May 2025 03:00) (87 - 106)  BP: --  BP(mean): --  RR: 18 (15 May 2025 03:00) (14 - 20)  SpO2: 96% (15 May 2025 03:00) (95% - 99%)    Parameters below as of 15 May 2025 03:00  Patient On (Oxygen Delivery Method): nasal cannula, high flow  O2 Flow (L/min): 40  O2 Concentration (%): 40    PHYSICAL EXAM:  Constitutional:  Alert, elderly male, pleasant, speaking in short sentences  Eyes:  Sclerae anicteric, conjunctivae clear, PERRL  Ear/Nose/Throat:  No nasal exudate or sinus tenderness  Neck:  Supple, no adenopathy  Respiratory:  Clear bilaterally  Cardiovascular:  RRR, S1S2, no murmur appreciated  Gastrointestinal:  Symmetric, normoactive BS, soft, NT  Extremities:  No edema      LABS:                        10.3   1.42  )-----------( 112      ( 14 May 2025 21:43 )             31.7         05-14    147[H]  |  105  |  44[H]  ----------------------------<  199[H]  3.9   |  31  |  1.39[H]    Ca    9.1      14 May 2025 21:43  Phos  3.6     05-14  Mg     2.1     05-14    TPro  5.7[L]  /  Alb  2.5[L]  /  TBili  0.3  /  DBili  x   /  AST  61[H]  /  ALT  73[H]  /  AlkPhos  79  05-14      Urinalysis Basic - ( 14 May 2025 21:43 )    Color: x / Appearance: x / SG: x / pH: x  Gluc: 199 mg/dL / Ketone: x  / Bili: x / Urobili: x   Blood: x / Protein: x / Nitrite: x   Leuk Esterase: x / RBC: x / WBC x   Sq Epi: x / Non Sq Epi: x / Bacteria: x        MICROBIOLOGY:    Culture - Blood (collected 13 May 2025 08:58)  Source: Blood Blood-Peripheral  Preliminary Report (14 May 2025 15:02):    No growth at 24 hours    Culture - Blood (collected 12 May 2025 09:22)  Source: Blood Blood-Peripheral  Preliminary Report (14 May 2025 17:01):    No growth at 48 Hours    Culture - Blood (collected 11 May 2025 07:40)  Source: Blood Blood-Peripheral  Preliminary Report (14 May 2025 13:01):    No growth at 72 Hours    Culture - Blood (collected 10 May 2025 18:10)  Source: Blood Blood-Peripheral  Gram Stain (12 May 2025 07:37):    Growth in anaerobic bottle: Gram Positive Cocci in Pairs and Chains    Growth in aerobic bottle: Gram Positive Cocci in Pairs and Chains  Final Report (13 May 2025 08:46):    Growth in aerobic and anaerobic bottles: Enterococcus faecalis    See previous culture [id number]    Culture - Bronchial (collected 09 May 2025 19:46)  Source: Bronchial Bronchial Lavage  Gram Stain (10 May 2025 12:20):    Rare polymorphonuclear leukocytes per low power field    Rare Squamous epithelial cells per low power field    No organisms seen per oil power field  Final Report (11 May 2025 14:13):    Commensal usha consistent with body site    Urinalysis with Rflx Culture (collected 09 May 2025 18:19)    Culture - Blood (collected 09 May 2025 17:21)  Source: Blood Blood-Peripheral  Gram Stain (11 May 2025 00:58):    Growth in anaerobic bottle: Gram Positive Cocci in Pairs and Chains    Growth in aerobic bottle: Gram Positive Cocci in Pairs and Chains  Final Report (12 May 2025 12:30):    Growth in aerobic and anaerobic bottles: Enterococcus faecalis  Organism: Enterococcus faecalis (12 May 2025 12:30)  Organism: Enterococcus faecalis (12 May 2025 12:30)    Culture - Blood (collected 09 May 2025 17:21)  Source: Blood Blood-Peripheral  Gram Stain (10 May 2025 21:38):    Growth in aerobic and anaerobic bottles: Gram Positive Cocci in Pairs and    Chains  Final Report (11 May 2025 13:51):    Growth in aerobic and anaerobic bottles: Enterococcus faecalis    See previous culture [id number]          RADIOLOGY & ADDITIONAL STUDIES:

## 2025-05-14 NOTE — PROGRESS NOTE ADULT - ASSESSMENT
79M w/PMHx of HTN, T2DM presented to [facility] with sob, generalized weakness and hypoxic resp failure found to have LANETTE with transaminitis and CT chest suggestive of multifocal pneumonia. Blood cultures growing E.fecalis with TTE concerning for thickening of AV leaflets/vegetation. He was placed on Abx and Tx to [facility] for further mgmt.   A/p  Impression: LAWRENCE infectious endocarditis E. Fecalis   Presented with profound shock and high pressors requirement, admitted in CTU/ intubated and lined up with PAC in place   Has been making good progress with improving shock and pressors requirement/ improving LANETTE - has not required RRT as of yet   However per pt and HCP he was terminally extubated  with DNI/DNR in place per pt's wishes / refusing surgical mgmt at this time and willing to proceed with medical treatment only   Remained stable overnight/  decreased in am / remained in sinus, warm on exam / sCr stable good UOP on bumex drip  CXR with bilateral opacities - mildly improving congestion/ he is on HFNC for splinting otherwise good cough and expectoration -- will start IS with aggressive chest PT  TFs at goal/ pending SLP eval / if passes will start POs but continue cyclic feeds for adequate calorie intake   CLx neg since  with clinically improved shock, afebrile though White count drifting for the past few days / Abx related vs bone marrow suppression / discussed with ID for alternative regimen   Would consult heme for consideration of Neupogen  ICU ppx / RIJ dc'd/ LIJ introducer day 5 c/d/i  OOB with PT/OT    Will continue current supportive and medical mgmt/Pt again requested deescalation of all care at once, rediscussed his progress and stability agreed to start antidepressant and is willing to continue/cooperate for another 2 weeks with no escalation of care.     ATTENDING: I have personally and independently provided 105 min of critical care services. This excludes any time spent on separate procedures or teaching. 79M w/PMHx of HTN, T2DM presented to [facility] with sob, generalized weakness and hypoxic resp failure found to have LANETTE with transaminitis and CT chest suggestive of multifocal pneumonia. Blood cultures growing E.fecalis with TTE concerning for thickening of AV leaflets/vegetation. He was placed on Abx and Tx to [facility] for further mgmt.   A/p  Impression: LAWRENCE infectious endocarditis E. Fecalis   Presented with profound shock and high pressors requirement, admitted in CTU/ intubated and lined up with PAC in place   Has been making good progress with improving shock and pressors requirement/ improving LANETTE - has not required RRT as of yet   However per pt and HCP he was terminally extubated  with DNI/DNR in place per pt's wishes / refusing surgical mgmt at this time and willing to proceed with medical treatment only   Remained stable overnight/  decreased in am / remained in sinus, warm on exam / sCr stable good UOP on bumex drip  CXR with bilateral opacities - mildly improving congestion/ he is on HFNC for splinting otherwise good cough and expectoration -- will start IS with aggressive chest PT  TFs at goal/ pending SLP eval / if passes will start POs but continue cyclic feeds for adequate calorie intake   CLx neg since  with clinically improved shock, afebrile though White count drifting for the past few days / B lactam related vs bone marrow suppression / discussed with ID for alternative regimen   Neupogen x 1   ICU ppx / RIJ dc'd/ LIJ introducer day 5 c/d/i  OOB with PT/OT    Will continue current supportive and medical mgmt/Pt again requested deescalation of all care, rediscussed his progress and stability agreed to start antidepressant and is willing to continue/cooperate for another 2 weeks with no escalation of care.     ATTENDING: I have personally and independently provided 105 min of critical care services. This excludes any time spent on separate procedures or teaching.

## 2025-05-14 NOTE — PHYSICAL THERAPY INITIAL EVALUATION ADULT - RANGE OF MOTION EXAMINATION, REHAB EVAL
decreased BLE knee extension/bilateral upper extremity ROM was WFL (within functional limits)/bilateral lower extremity ROM was WFL (within functional limits)

## 2025-05-14 NOTE — SWALLOW BEDSIDE ASSESSMENT ADULT - SLP GENERAL OBSERVATIONS
Awake & alert, pleasant & cooperative. Voice is moderately hypophonic, but Pt is able to make basic wants & needs known.

## 2025-05-14 NOTE — SWALLOW BEDSIDE ASSESSMENT ADULT - MODE OF PRESENTATION
thin via tsp x2; single cup sips x5; self-admin consecutive cup sips x3; puree via 1/2 tsp x3; 1 bite SBS/cup/spoon/self fed/fed by clinician

## 2025-05-14 NOTE — PHYSICAL THERAPY INITIAL EVALUATION ADULT - ADDITIONAL COMMENTS
Pt resides in private house with steps to enter and steps to bedroom. Pt. uses cane and RW, required more assistance with ADL PTA.

## 2025-05-14 NOTE — SWALLOW BEDSIDE ASSESSMENT ADULT - SLP PERTINENT HISTORY OF CURRENT PROBLEM
78 yo M w complex medical hx adm to [facility] from [place] w SOB and weakness x2 weeks. Pt was intubated from 5/9 - 5/13 for resp failure, terminally extubated and is now DNR/DNI with no escalation of care

## 2025-05-14 NOTE — PHYSICAL THERAPY INITIAL EVALUATION ADULT - PERTINENT HX OF CURRENT PROBLEM, REHAB EVAL
79M with PMHx of HTN, HLD, T2DM, acute cholecystectomy c/b pleural effusions requiring drainage who presented to [facility] with sob and weakness x 2 weeks. In ED, he was found to have elevated troponins, Cr 1.9 (baseline 0.75) [place] 364[address] and transaminitis. He was hypoxic in ED with CT chest showing multifocal PNA and acute respiratory failure. He was placed on HFNC and transferred to ICU. Blood cultures were + for gram positive cocci. TTE showed reduce LVEF with thickening of AV leaflets concerning for vegetation. Steroids were started.  He was placed on Vanc/Zosyn/Azithromycin and is now transferred to [facility] for mgmt. LAWRENCE infectious endocarditis E. Fecalis   Presented with profound shock and high pressors requirement, admitted in CTU/ intubated and lined up with PAC in place

## 2025-05-14 NOTE — SWALLOW BEDSIDE ASSESSMENT ADULT - SWALLOW EVAL: RECOMMENDED DIET
Puree / thin liquids (Pt stated preference for non-chewable food; however can adv to minced & moist or SBS if Pt wishes)

## 2025-05-14 NOTE — SWALLOW BEDSIDE ASSESSMENT ADULT - SWALLOW EVAL: DIAGNOSIS
Pt p/w mild pharyngeal dysphagia characterized by intermittent throat clear with thin liquids, worse with cup sips, suggestive of penetration and/or aspiration.

## 2025-05-14 NOTE — PROGRESS NOTE ADULT - SUBJECTIVE AND OBJECTIVE BOX
INTERVAL COURSE  [facility]WRENCE IE-- medical mgmt  Extubated, on HFNC/ Off pressors  Down on    Labs with stable sCr ~ 1.5/ good UOP on bumex drip  WBC trending down for the past 2 days but remained afebrile with resolved shock / Clx remained neg since the        VITALS  Vital Signs Last 24 Hrs  T(C): 36.1 (14 May 2025 09:14), Max: 37.4 (14 May 2025 05:24)  T(F): 97 (14 May 2025 09:14), Max: 99.4 (14 May 2025 05:24)  HR: 88 (14 May 2025 11:00) (78 - 99)  BP: 122/38 mmhg   MAP: 65 mmhg  RR: 18 (14 May 2025 11:00) (16 - 20)  SpO2: 96% (14 May 2025 11:00) (96% - 100%)    Parameters below as of 14 May 2025 12:00  Patient On (Oxygen Delivery Method): nasal cannula w/ humidification  O2 Flow (L/min): 40  O2 Concentration (%): 40    I&O's Summary  14 May 2025 07:01  -  14 May 2025 11:37  --------------------------------------------------------  IN: 312 mL / OUT: 670 mL / NET: -358 mL    PHYSICAL EXAM  General: A&Ox 3; NAD, satting well on HFNC  Neck: no JVD  Respiratory: Diffuse rhonchi present   Cardiovascular: Regular rhythm/rate  Gastrointestinal: Soft; NTND  Extremities: WWP; mo edema   Neurological:  CNII-XII grossly intact; no obvious focal deficits    LABS/IMAGING/EKG/ETC  Reviewed.

## 2025-05-15 DIAGNOSIS — Z91.89 OTHER SPECIFIED PERSONAL RISK FACTORS, NOT ELSEWHERE CLASSIFIED: ICD-10-CM

## 2025-05-15 DIAGNOSIS — Z71.89 OTHER SPECIFIED COUNSELING: ICD-10-CM

## 2025-05-15 DIAGNOSIS — I38 ENDOCARDITIS, VALVE UNSPECIFIED: ICD-10-CM

## 2025-05-15 DIAGNOSIS — Z51.5 ENCOUNTER FOR PALLIATIVE CARE: ICD-10-CM

## 2025-05-15 DIAGNOSIS — R53.81 OTHER MALAISE: ICD-10-CM

## 2025-05-15 LAB
ALBUMIN SERPL ELPH-MCNC: 2.4 G/DL — LOW (ref 3.3–5)
ALP SERPL-CCNC: 78 U/L — SIGNIFICANT CHANGE UP (ref 40–120)
ALT FLD-CCNC: 72 U/L — HIGH (ref 10–45)
ANION GAP SERPL CALC-SCNC: 11 MMOL/L — SIGNIFICANT CHANGE UP (ref 5–17)
APTT BLD: 30.7 SEC — SIGNIFICANT CHANGE UP (ref 26.1–36.8)
AST SERPL-CCNC: 60 U/L — HIGH (ref 10–40)
BASOPHILS # BLD AUTO: 0.01 K/UL — SIGNIFICANT CHANGE UP (ref 0–0.2)
BASOPHILS NFR BLD AUTO: 0.9 % — SIGNIFICANT CHANGE UP (ref 0–2)
BILIRUB SERPL-MCNC: 0.3 MG/DL — SIGNIFICANT CHANGE UP (ref 0.2–1.2)
BUN SERPL-MCNC: 45 MG/DL — HIGH (ref 7–23)
C DIFF GDH STL QL: NEGATIVE — SIGNIFICANT CHANGE UP
C DIFF GDH STL QL: SIGNIFICANT CHANGE UP
CALCIUM SERPL-MCNC: 8.9 MG/DL — SIGNIFICANT CHANGE UP (ref 8.4–10.5)
CHLORIDE SERPL-SCNC: 105 MMOL/L — SIGNIFICANT CHANGE UP (ref 96–108)
CO2 SERPL-SCNC: 31 MMOL/L — SIGNIFICANT CHANGE UP (ref 22–31)
CREAT SERPL-MCNC: 1.36 MG/DL — HIGH (ref 0.5–1.3)
EGFR: 53 ML/MIN/1.73M2 — LOW
EGFR: 53 ML/MIN/1.73M2 — LOW
EOSINOPHIL # BLD AUTO: 0.07 K/UL — SIGNIFICANT CHANGE UP (ref 0–0.5)
EOSINOPHIL NFR BLD AUTO: 4.5 % — SIGNIFICANT CHANGE UP (ref 0–6)
GAS PNL BLDA: SIGNIFICANT CHANGE UP
GLUCOSE SERPL-MCNC: 152 MG/DL — HIGH (ref 70–99)
GRAM STN FLD: ABNORMAL
HCT VFR BLD CALC: 33.2 % — LOW (ref 39–50)
HGB BLD-MCNC: 11.1 G/DL — LOW (ref 13–17)
INR BLD: 0.85 — SIGNIFICANT CHANGE UP (ref 0.85–1.16)
LYMPHOCYTES # BLD AUTO: 0.54 K/UL — LOW (ref 1–3.3)
LYMPHOCYTES # BLD AUTO: 35.7 % — SIGNIFICANT CHANGE UP (ref 13–44)
MAGNESIUM SERPL-MCNC: 1.9 MG/DL — SIGNIFICANT CHANGE UP (ref 1.6–2.6)
MCHC RBC-ENTMCNC: 30.7 PG — SIGNIFICANT CHANGE UP (ref 27–34)
MCHC RBC-ENTMCNC: 33.4 G/DL — SIGNIFICANT CHANGE UP (ref 32–36)
MCV RBC AUTO: 91.7 FL — SIGNIFICANT CHANGE UP (ref 80–100)
MONOCYTES # BLD AUTO: 0.2 K/UL — SIGNIFICANT CHANGE UP (ref 0–0.9)
MONOCYTES NFR BLD AUTO: 13.4 % — SIGNIFICANT CHANGE UP (ref 2–14)
NEUTROPHILS # BLD AUTO: 0.68 K/UL — LOW (ref 1.8–7.4)
NEUTROPHILS NFR BLD AUTO: 45.5 % — SIGNIFICANT CHANGE UP (ref 43–77)
PHOSPHATE SERPL-MCNC: 3.3 MG/DL — SIGNIFICANT CHANGE UP (ref 2.5–4.5)
PLATELET # BLD AUTO: 122 K/UL — LOW (ref 150–400)
POTASSIUM SERPL-MCNC: 3.8 MMOL/L — SIGNIFICANT CHANGE UP (ref 3.5–5.3)
POTASSIUM SERPL-SCNC: 3.8 MMOL/L — SIGNIFICANT CHANGE UP (ref 3.5–5.3)
PROT SERPL-MCNC: 5.7 G/DL — LOW (ref 6–8.3)
PROTHROM AB SERPL-ACNC: 10 SEC — SIGNIFICANT CHANGE UP (ref 9.9–13.4)
RBC # BLD: 3.62 M/UL — LOW (ref 4.2–5.8)
RBC # FLD: 18.4 % — HIGH (ref 10.3–14.5)
SODIUM SERPL-SCNC: 147 MMOL/L — HIGH (ref 135–145)
WBC # BLD: 1.5 K/UL — LOW (ref 3.8–10.5)
WBC # FLD AUTO: 1.5 K/UL — LOW (ref 3.8–10.5)

## 2025-05-15 PROCEDURE — 99291 CRITICAL CARE FIRST HOUR: CPT

## 2025-05-15 PROCEDURE — 99292 CRITICAL CARE ADDL 30 MIN: CPT

## 2025-05-15 PROCEDURE — 99232 SBSQ HOSP IP/OBS MODERATE 35: CPT

## 2025-05-15 PROCEDURE — 99497 ADVNCD CARE PLAN 30 MIN: CPT | Mod: 25

## 2025-05-15 PROCEDURE — G0545: CPT

## 2025-05-15 PROCEDURE — 99223 1ST HOSP IP/OBS HIGH 75: CPT

## 2025-05-15 PROCEDURE — 71045 X-RAY EXAM CHEST 1 VIEW: CPT | Mod: 26

## 2025-05-15 RX ORDER — BUMETANIDE 1 MG/1
1 TABLET ORAL EVERY 8 HOURS
Refills: 0 | Status: DISCONTINUED | OUTPATIENT
Start: 2025-05-15 | End: 2025-05-16

## 2025-05-15 RX ORDER — DAPTOMYCIN 500 MG/10ML
750 INJECTION, POWDER, LYOPHILIZED, FOR SOLUTION INTRAVENOUS EVERY 24 HOURS
Refills: 0 | Status: DISCONTINUED | OUTPATIENT
Start: 2025-05-15 | End: 2025-05-16

## 2025-05-15 RX ORDER — CEFTRIAXONE 500 MG/1
2000 INJECTION, POWDER, FOR SOLUTION INTRAMUSCULAR; INTRAVENOUS EVERY 12 HOURS
Refills: 0 | Status: DISCONTINUED | OUTPATIENT
Start: 2025-05-15 | End: 2025-05-15

## 2025-05-15 RX ORDER — INSULIN GLARGINE-YFGN 100 [IU]/ML
20 INJECTION, SOLUTION SUBCUTANEOUS AT BEDTIME
Refills: 0 | Status: DISCONTINUED | OUTPATIENT
Start: 2025-05-15 | End: 2025-05-16

## 2025-05-15 RX ORDER — ACETAZOLAMIDE 250 MG/1
500 TABLET ORAL ONCE
Refills: 0 | Status: COMPLETED | OUTPATIENT
Start: 2025-05-15 | End: 2025-05-15

## 2025-05-15 RX ADMIN — ACETAZOLAMIDE 110 MILLIGRAM(S): 250 TABLET ORAL at 04:55

## 2025-05-15 RX ADMIN — Medication 2: at 11:35

## 2025-05-15 RX ADMIN — ESCITALOPRAM OXALATE 10 MILLIGRAM(S): 20 TABLET ORAL at 21:28

## 2025-05-15 RX ADMIN — Medication 10 UNIT(S): at 06:49

## 2025-05-15 RX ADMIN — CEFTRIAXONE 100 MILLIGRAM(S): 500 INJECTION, POWDER, FOR SOLUTION INTRAMUSCULAR; INTRAVENOUS at 05:52

## 2025-05-15 RX ADMIN — Medication 20 MILLIEQUIVALENT(S): at 05:52

## 2025-05-15 RX ADMIN — Medication 3 MILLILITER(S): at 14:33

## 2025-05-15 RX ADMIN — AMPICILLIN SODIUM 216 GRAM(S): 1 INJECTION, POWDER, FOR SOLUTION INTRAMUSCULAR; INTRAVENOUS at 05:52

## 2025-05-15 RX ADMIN — Medication 3 MILLILITER(S): at 05:12

## 2025-05-15 RX ADMIN — Medication 81 MILLIGRAM(S): at 11:35

## 2025-05-15 RX ADMIN — Medication 2: at 17:15

## 2025-05-15 RX ADMIN — Medication 5 MILLIGRAM(S): at 21:28

## 2025-05-15 RX ADMIN — Medication 8 UNIT(S): at 06:50

## 2025-05-15 RX ADMIN — BUMETANIDE 1.25 MG/HR: 1 TABLET ORAL at 05:53

## 2025-05-15 RX ADMIN — HEPARIN SODIUM 5000 UNIT(S): 1000 INJECTION INTRAVENOUS; SUBCUTANEOUS at 05:53

## 2025-05-15 RX ADMIN — DOBUTAMINE 2.24 MICROGRAM(S)/KG/MIN: 250 INJECTION INTRAVENOUS at 05:53

## 2025-05-15 RX ADMIN — BUMETANIDE 1 MILLIGRAM(S): 1 TABLET ORAL at 21:28

## 2025-05-15 RX ADMIN — BUMETANIDE 1 MILLIGRAM(S): 1 TABLET ORAL at 13:25

## 2025-05-15 RX ADMIN — Medication 40 MILLIGRAM(S): at 06:49

## 2025-05-15 RX ADMIN — Medication 1 APPLICATION(S): at 05:54

## 2025-05-15 RX ADMIN — AMPICILLIN SODIUM 216 GRAM(S): 1 INJECTION, POWDER, FOR SOLUTION INTRAMUSCULAR; INTRAVENOUS at 11:36

## 2025-05-15 RX ADMIN — INSULIN GLARGINE-YFGN 20 UNIT(S): 100 INJECTION, SOLUTION SUBCUTANEOUS at 22:12

## 2025-05-15 RX ADMIN — HEPARIN SODIUM 5000 UNIT(S): 1000 INJECTION INTRAVENOUS; SUBCUTANEOUS at 21:28

## 2025-05-15 RX ADMIN — DAPTOMYCIN 130 MILLIGRAM(S): 500 INJECTION, POWDER, LYOPHILIZED, FOR SOLUTION INTRAVENOUS at 18:21

## 2025-05-15 RX ADMIN — HEPARIN SODIUM 5000 UNIT(S): 1000 INJECTION INTRAVENOUS; SUBCUTANEOUS at 13:25

## 2025-05-15 RX ADMIN — Medication 3 MILLILITER(S): at 20:47

## 2025-05-15 NOTE — PROGRESS NOTE ADULT - SUBJECTIVE AND OBJECTIVE BOX
CTICU  CRITICAL  CARE  attending     Hand off received 	    CHIEF COMPLAINT :  av endocarditis    				   Pertinent clinical, laboratory, radiographic, hemodynamic, echocardiographic, respiratory data, microbiologic data and chart were reviewed and analyzed frequently throughout the course of the day and night  Patient seen and examined with CTS/ SH attending at bedside    INTERVAL HISTORY  uneventful     Pt is a 79y , Male, HEALTH ISSUES - PROBLEM Dx:      , FAMILY HISTORY:  PAST MEDICAL & SURGICAL HISTORY:  HTN (hypertension)      HLD (hyperlipidemia)      Pneumonia      Aortic valve endocarditis      H/O appendicostomy      History of cholecystectomy        Patient is a 79y old  Male who presents with a chief complaint of AORTIC VALVE ENDOCARDITI    AORTIC VALVE ENDOCARDITIS     (11 May 2025 18:34)      14 system review was unremarkable    Vital signs, hemodynamic and respiratory parameters were reviewed from the bedside nursing flowsheet.  ICU Vital Signs Last 24 Hrs  T(C): 36.1 (15 May 2025 09:02), Max: 37 (14 May 2025 22:22)  T(F): 97 (15 May 2025 09:02), Max: 98.6 (14 May 2025 22:22)  HR: 94 (15 May 2025 13:00) (87 - 106)  BP: 115/55 (15 May 2025 12:00) (115/55 - 115/55)  BP(mean): 79 (15 May 2025 12:00) (79 - 79)  ABP: 138/41 (15 May 2025 12:00) (116/38 - 154/43)  ABP(mean): 73 (15 May 2025 12:00) (61 - 79)  RR: 18 (15 May 2025 13:00) (14 - 18)  SpO2: 97% (15 May 2025 13:00) (95% - 99%)    O2 Parameters below as of 15 May 2025 13:00  Patient On (Oxygen Delivery Method): nasal cannula, high flow  O2 Flow (L/min): 40  O2 Concentration (%): 40      Adult Advanced Hemodynamics Last 24 Hrs  CVP(mm Hg): --  CVP(cm H2O): --  CO: --  CI: --  PA: --  PA(mean): --  PCWP: --  SVR: --  SVRI: --  PVR: --  PVRI: --, ABG - ( 15 May 2025 03:09 )  pH, Arterial: 7.51  pH, Blood: x     /  pCO2: 46    /  pO2: 107   / HCO3: 37    / Base Excess: 12.1  /  SaO2: 99.1                Intake and output was reviewed and the fluid balance was calculated  Daily     Daily   I&O's Summary    14 May 2025 07:01  -  15 May 2025 07:00  --------------------------------------------------------  IN: 1788.1 mL / OUT: 3850 mL / NET: -2061.9 mL    15 May 2025 07:01  -  15 May 2025 13:16  --------------------------------------------------------  IN: 113.6 mL / OUT: 425 mL / NET: -311.4 mL        All lines and drain sites were assessed  [facility]      POCT Blood Glucose.: 192 mg/dL (15 May 2025 11:29)    No acute change in mental status  Auscultation of the chest reveals equal bs  Abdomen is soft  Extremities are warm and well perfused  Wounds appear clean and unremarkable  Antibiotics are periop    labs  CBC Full  -  ( 15 May 2025 03:39 )  WBC Count : 1.50 K/uL  RBC Count : 3.62 M/uL  Hemoglobin : 11.1 g/dL  Hematocrit : 33.2 %  Platelet Count - Automated : 122 K/uL  Mean Cell Volume : 91.7 fl  Mean Cell Hemoglobin : 30.7 pg  Mean Cell Hemoglobin Concentration : 33.4 g/dL  Auto Neutrophil # : x  Auto Lymphocyte # : x  Auto Monocyte # : x  Auto Eosinophil # : x  Auto Basophil # : x  Auto Neutrophil % : x  Auto Lymphocyte % : x  Auto Monocyte % : x  Auto Eosinophil % : x  Auto Basophil % : x    05-15    147[H]  |  105  |  45[H]  ----------------------------<  152[H]  3.8   |  31  |  1.36[H]    Ca    8.9      15 May 2025 03:39  Phos  3.3     05-15  Mg     1.9     05-15    TPro  5.7[L]  /  Alb  2.4[L]  /  TBili  0.3  /  DBili  x   /  AST  60[H]  /  ALT  72[H]  /  AlkPhos  78  05-15    PT/INR - ( 15 May 2025 03:39 )   PT: 10.0 sec;   INR: 0.85          PTT - ( 15 May 2025 03:39 )  PTT:30.7 sec  The current medications were reviewed   MEDICATIONS  (STANDING):  ampicillin  IVPB      ampicillin  IVPB 2 Gram(s) IV Intermittent every 6 hours  aspirin  chewable 81 milliGRAM(s) Oral daily  buMETAnide Injectable 1 milliGRAM(s) IV Push every 8 hours  cefTRIAXone   IVPB 2000 milliGRAM(s) IV Intermittent every 12 hours  chlorhexidine 2% Cloths 1 Application(s) Topical <User Schedule>  dextrose 5%. 1000 milliLiter(s) (50 mL/Hr) IV Continuous <Continuous>  dextrose 5%. 1000 milliLiter(s) (100 mL/Hr) IV Continuous <Continuous>  dextrose 50% Injectable 50 milliLiter(s) IV Push every 15 minutes  escitalopram 10 milliGRAM(s) Oral at bedtime  glucagon  Injectable 1 milliGRAM(s) IntraMuscular once  heparin   Injectable 5000 Unit(s) SubCutaneous every 8 hours  insulin glargine Injectable (LANTUS) 20 Unit(s) SubCutaneous at bedtime  insulin regular  human corrective regimen sliding scale   SubCutaneous every 6 hours  insulin regular  human recombinant 8 Unit(s) SubCutaneous every 6 hours  melatonin 5 milliGRAM(s) Oral at bedtime  pantoprazole    Tablet 40 milliGRAM(s) Oral before breakfast  polyethylene glycol 3350 17 Gram(s) Oral every 24 hours  senna 2 Tablet(s) Oral at bedtime  sodium chloride 0.9% lock flush 3 milliLiter(s) IV Push every 8 hours    MEDICATIONS  (PRN):  dextrose Oral Gel 15 Gram(s) Oral once PRN Blood Glucose LESS THAN 70 milliGRAM(s)/deciliter       PROBLEM LIST/ ASSESSMENT:  HEALTH ISSUES - PROBLEM Dx:      ,   Patient is a 79y old  Male who presents with a chief complaint of AORTIC VALVE ENDOCARDITI    AORTIC VALVE ENDOCARDITIS     (11 May 2025 18:34)    Acute systolic and diastolic heart failure evidenced by SOB and parenchymal infiltrates; will treat with diuresis    Cardiogenic shock on ionotropy      Acute post operative pulmonary insufficiency ruled in due to hypoxemia, O2 sats < 91% on RA treated with HFNC    Toxic metabolic encephalopathy ; sundowning due to anesthesia pain medications    neuttropenic sepsiss      My plan includes :    appreciate palliative care input -   will dc dobut, art line     monitor nutritional input     close hemodynamic, ventilatory and drain monitoring and management per post op routine    Monitor for arrhythmias and monitor parameters for organ perfusion  beta blockade not administered due to hemodynamic instability and bradycardia  monitor neurologic status  Head of the bed should remain elevated to 45 deg .   chest PT and IS will be encouraged  monitor adequacy of oxygenation and ventilation and attempt to wean oxygen  antibiotic regimen will be tailored to the clinical, laboratory and microbiologic data  Nutritional goals will be met using po eventually , ensure adequate caloric intake and montior the same  Stress ulcer and VTE prophylaxis will be achieved    Glycemic control is satisfactory  Electrolytes have been repleted as necessary and wound care has been carried out. Pain control has been achieved.   agressive physical therapy and early mobility and ambulation goals will be met   The family was updated about the course and plan  CRITICAL CARE TIME Upon my evaluation, this patient had a high probability of imminent or life-threatening deterioration due to the above problems which required my direct attention, intervention, and personal management.  I have personally provided 110 minutes of critical care time exclusive of time spent on separately billable procedures. Time included review of laboratory data, radiology results, discussion with consultants, and monitoring for potential decompensation. Interventions were performed as documented abovepersonally provided by me  in evaluation and management, reassessments, review and interpretation of labs and x-rays, ventilator and hemodynamic management, formulating a plan and coordinating care. Time spent was non routine post-operarive caRE and included multiple and repeated evaluations at the bedside  Time does not include procedural time.    CTICU ATTENDING     					    Keven Russell MD

## 2025-05-15 NOTE — PROGRESS NOTE ADULT - ASSESSMENT
79M w/ PMHx of DM, HTN, HLD, hx of hospitalization in [place] (1/2025, 2/2025) for sepsis 2/2 acute   cholecystitis (s/p cholecystectomy), c/b postsurgical hematoma, b/l pleural effusions s/p thoracentesis of R pleural effusion on 3/3/25 at [facility] [pleural fluid and BCx neg at the time].  Pt son is an ED RN at [place] and tells me that pt had Enterococcal bacteremia while he was hospitalized in [place]. Source felt to be GI/cholecystitis at the time. Unsure of what Abx were given back then and for how long.   He subsequently went to rehab and eventually returned to [facility]. Was taken to [facility] for weakness and dyspnea x 2 wks with significant worsening 2d pta.  There he denied fever, chills, chest pain, nausea, vomiting, abdominal pain, diarrhea, dysuria; reported decreased   urination. There, he was tachypneic with O2 sats in the 80s on RA and low 90s on 4L O2 via NC. CXR with fluid overload vs pneumonia. WBC WNL with N predom, UA neg, UCx w <10k UGF.   CT c/a/p showed Interval development of multifocal central consolidative opacities throughout the lungs, particularly the upper lobes and left lower lobe, R pleural effusion was noted to have dec in size, new small L pleural effusion (complex per HIE crit care note), significant interval decrease in size of the subcapsular hematoma along the inferior aspect of the liver.  Patient reportedly met sepsis criteria and was admitted to the ICU for sepsis and was in resp distress. Was given Vanc/Piptazo/Azithro + stress steroids. Was on bipap.   5/8 BCx + E fecalis (S- Amp, Vanco).   5/9 BCx + (4/4) w E fecalis.   5/10 BCx + E fecalis  5/11 BCx ngtd  5/12 BCx ngtd  5/13 BCx ngtd  Echo with aortic valve vegetations, EF 40-45%  Xferred to [facility] for possible surgical mgmt of AoV endocarditis and critical care.  Family now opting for medical mgmt only, extubated 2d ago.   He has now developed leukopenia presumably 2/2 beta lactam Abx.  Was given filgrastim yesterday.   WBC 1.5k today, .    #Enterococcal bacteremia and AoV (NV) endocarditis  - F/u surveillance BCx  - SHELLY when able  - DC Ampicillin and CTX  - Check CK level  - Start IV Daptomycin 10mg/kg 750 mg q24h   - He will need 6 wk Abx since BCx clearance for medical mgmt of IE    # Loose stools  - C diff neg  - Could be d/t TF  - Check GI PCR if diarrhea recurs    ID Team 1 will follow

## 2025-05-15 NOTE — CONSULT NOTE ADULT - CONSULT REASON
Symptom Management and Complex Medical Decision Making/GOC in the setting of Advanced Illness
endocarditis
LANETTE

## 2025-05-15 NOTE — PROGRESS NOTE ADULT - SUBJECTIVE AND OBJECTIVE BOX
INTERVAL HPI/OVERNIGHT EVENTS: Temp 96.2F, on HFNC, off pressor support.     CONSTITUTIONAL:  No fever, + chills, no night sweats  EYES:  No photophobia or visual changes  CARDIOVASCULAR:  No chest pain  RESPIRATORY:  No cough, wheezing, or SOB   GASTROINTESTINAL:  No nausea, vomiting, diarrhea, constipation, or abdominal pain  GENITOURINARY:  No frequency, urgency, dysuria or hematuria  NEUROLOGIC:  No headache, lightheadedness      ANTIBIOTICS/RELEVANT:    ampicillin  IVPB   216 mL/Hr IV Intermittent (05-10-25 @ 14:23)    ampicillin  IVPB   216 mL/Hr IV Intermittent (05-15-25 @ 11:36)   216 mL/Hr IV Intermittent (05-15-25 @ 05:52)   216 mL/Hr IV Intermittent (05-14-25 @ 23:28)   216 mL/Hr IV Intermittent (05-14-25 @ 17:07)   216 mL/Hr IV Intermittent (05-14-25 @ 11:00)   216 mL/Hr IV Intermittent (05-14-25 @ 05:16)   216 mL/Hr IV Intermittent (05-13-25 @ 23:00)   216 mL/Hr IV Intermittent (05-13-25 @ 18:43)   216 mL/Hr IV Intermittent (05-13-25 @ 11:04)   216 mL/Hr IV Intermittent (05-13-25 @ 06:21)   216 mL/Hr IV Intermittent (05-13-25 @ 00:04)   216 mL/Hr IV Intermittent (05-12-25 @ 17:46)   216 mL/Hr IV Intermittent (05-12-25 @ 11:08)   216 mL/Hr IV Intermittent (05-12-25 @ 06:42)   216 mL/Hr IV Intermittent (05-12-25 @ 00:52)   216 mL/Hr IV Intermittent (05-11-25 @ 18:10)   216 mL/Hr IV Intermittent (05-11-25 @ 11:12)   216 mL/Hr IV Intermittent (05-11-25 @ 06:23)   216 mL/Hr IV Intermittent (05-10-25 @ 23:50)   216 mL/Hr IV Intermittent (05-10-25 @ 18:13)    azithromycin  IVPB   255 mL/Hr IV Intermittent (05-09-25 @ 20:46)    cefTRIAXone   IVPB   100 mL/Hr IV Intermittent (05-15-25 @ 05:52)   100 mL/Hr IV Intermittent (05-14-25 @ 17:28)   100 mL/Hr IV Intermittent (05-14-25 @ 05:16)   100 mL/Hr IV Intermittent (05-13-25 @ 17:47)   100 mL/Hr IV Intermittent (05-13-25 @ 06:21)   100 mL/Hr IV Intermittent (05-12-25 @ 17:51)   100 mL/Hr IV Intermittent (05-12-25 @ 05:41)   100 mL/Hr IV Intermittent (05-11-25 @ 17:15)   100 mL/Hr IV Intermittent (05-11-25 @ 06:23)   100 mL/Hr IV Intermittent (05-10-25 @ 19:20)    piperacillin/tazobactam IVPB.   200 mL/Hr IV Intermittent (05-09-25 @ 18:59)    piperacillin/tazobactam IVPB.-   25 mL/Hr IV Intermittent (05-09-25 @ 23:15)    piperacillin/tazobactam IVPB..   25 mL/Hr IV Intermittent (05-10-25 @ 05:14)    vancomycin  IVPB   300 mL/Hr IV Intermittent (05-09-25 @ 21:46)            Vital Signs Last 24 Hrs  T(C): 36.6 (15 May 2025 16:56), Max: 37 (14 May 2025 22:22)  T(F): 97.8 (15 May 2025 16:56), Max: 98.6 (14 May 2025 22:22)  HR: 91 (15 May 2025 14:46) (87 - 106)  BP: 117/56 (15 May 2025 14:46) (115/55 - 117/56)  BP(mean): 81 (15 May 2025 14:46) (79 - 81)  RR: 18 (15 May 2025 14:46) (14 - 18)  SpO2: 99% (15 May 2025 14:46) (95% - 100%)    Parameters below as of 15 May 2025 14:46  Patient On (Oxygen Delivery Method): nasal cannula, high flow  O2 Flow (L/min): 40  O2 Concentration (%): 40    PHYSICAL EXAM:  Constitutional:  Alert, elderly male, NGT  Eyes:  Sclerae anicteric, conjunctivae clear, PERRL  Ear/Nose/Throat:  No nasal exudate or sinus tenderness  Neck:  Supple, no adenopathy  Respiratory:  Clear bilaterally  Cardiovascular:  RRR, S1S2, no murmur appreciated  Gastrointestinal:  Symmetric, normoactive BS, soft, NT  Extremities:  No edema      LABS:                        11.1   1.50  )-----------( 122      ( 15 May 2025 03:39 )             33.2         05-15    147[H]  |  105  |  45[H]  ----------------------------<  152[H]  3.8   |  31  |  1.36[H]    Ca    8.9      15 May 2025 03:39  Phos  3.3     05-15  Mg     1.9     05-15    TPro  5.7[L]  /  Alb  2.4[L]  /  TBili  0.3  /  DBili  x   /  AST  60[H]  /  ALT  72[H]  /  AlkPhos  78  05-15      Urinalysis Basic - ( 15 May 2025 03:39 )    Color: x / Appearance: x / SG: x / pH: x  Gluc: 152 mg/dL / Ketone: x  / Bili: x / Urobili: x   Blood: x / Protein: x / Nitrite: x   Leuk Esterase: x / RBC: x / WBC x   Sq Epi: x / Non Sq Epi: x / Bacteria: x        MICROBIOLOGY:    Culture - Blood (collected 13 May 2025 08:58)  Source: Blood Blood-Peripheral  Preliminary Report (15 May 2025 15:02):    No growth at 48 Hours    Culture - Blood (collected 12 May 2025 09:22)  Source: Blood Blood-Peripheral  Preliminary Report (15 May 2025 17:02):    No growth at 72 Hours    Culture - Blood (collected 11 May 2025 07:40)  Source: Blood Blood-Peripheral  Preliminary Report (15 May 2025 13:01):    No growth at 4 days    Culture - Blood (collected 10 May 2025 18:10)  Source: Blood Blood-Peripheral  Gram Stain (12 May 2025 07:37):    Growth in anaerobic bottle: Gram Positive Cocci in Pairs and Chains    Growth in aerobic bottle: Gram Positive Cocci in Pairs and Chains  Final Report (13 May 2025 08:46):    Growth in aerobic and anaerobic bottles: Enterococcus faecalis    See previous culture [id number]    Culture - Bronchial (collected 09 May 2025 19:46)  Source: Bronchial Bronchial Lavage  Gram Stain (10 May 2025 12:20):    Rare polymorphonuclear leukocytes per low power field    Rare Squamous epithelial cells per low power field    No organisms seen per oil power field  Final Report (11 May 2025 14:13):    Commensal usha consistent with body site    Urinalysis with Rflx Culture (collected 09 May 2025 18:19)    Culture - Blood (collected 09 May 2025 17:21)  Source: Blood Blood-Peripheral  Gram Stain (11 May 2025 00:58):    Growth in anaerobic bottle: Gram Positive Cocci in Pairs and Chains    Growth in aerobic bottle: Gram Positive Cocci in Pairs and Chains  Final Report (12 May 2025 12:30):    Growth in aerobic and anaerobic bottles: Enterococcus faecalis  Organism: Enterococcus faecalis (12 May 2025 12:30)  Organism: Enterococcus faecalis (12 May 2025 12:30)    Culture - Blood (collected 09 May 2025 17:21)  Source: Blood Blood-Peripheral  Gram Stain (10 May 2025 21:38):    Growth in aerobic and anaerobic bottles: Gram Positive Cocci in Pairs and    Chains  Final Report (11 May 2025 13:51):    Growth in aerobic and anaerobic bottles: Enterococcus faecalis    See previous culture [id number]          RADIOLOGY & ADDITIONAL STUDIES:

## 2025-05-15 NOTE — CONSULT NOTE ADULT - ASSESSMENT
79M with PMH of HTN, HLD, T2DM, who is now transferred from [facility] for IE, has TTE showing reduce LVEF with thickening of AV leaflets concerning for vegetation. Pending surgical intervention for aortic valve endocarditis.     Pte has bScr ~0.8 and now Non oliguric iATN with sCr 2.0  [place] [facility]  Multiple episodes of hypotension on 5/9, now on pressors/inotrope     -Imp: Non oliguric iATN.   Multifactorial iATN (hypotension secondary to cardiogenic shock, ?embolic events).     -Plan:  No contraindication for surgical intervention from Nephro standpoint.   Already on Lasix drip as per primary team. Has uop>100ml/hr currently.   Continue pressors/inotrope, aim for SBP>110 as possible  Obtain Renal US, Cystatin C and U. prot/creat.   Strict I&O. Keep Campuzano placed.   No indication for RRT at this point.         
79M with PMHx of HTN, HLD, T2DM, acute cholecystectomy c/b pleural effusions requiring drainage who presented to [facility] with sob and weakness x 2 weeks, found to have multifocal pneumonia on CT chest along with positive blood cultures with Enterococcus faecalis. TTE showing reduced LVEF with concern for aortic valve vegetations. Transferred to [facility] for surgical management of suspected endocarditis c/b cardiogenic shock.     Review of studies:   BCx ([facility], 5/8): enterococcus faecalis, sensitivities pending  BCx (5/9): Pending    Recommendations:  - continue with ampicillin 2g q6hrs and CTX 2g q24hrs for enterococcus faecalis treatment  - F/u blood cultures, would continue to obtain daily blood cultures until cleared  - Endocarditis workup and surgical evaluation per CTS    ID will continue to follow, case discussed with Dr. Duffy.
80yo M with PMH of HTN, HLD, and DM2 p/w weakness and found to have endocarditis and PNA c/b respiratory failure and septic shock. Palliative consulted for complex medical decision making in the setting of critical illness.    ·	see GOC note: will continue to support patient/family through medical management, if patient is decompensating then would be transitioning to a purely symptom-directed approach

## 2025-05-15 NOTE — CONSULT NOTE ADULT - CONVERSATION DETAILS
Reviewed patient's hospital course and interval developments. Discussed advanced directives including code status, HCP completion, and hospice eligibility. Patient is aware of interval events and shows an understanding of the current medical issues. He states he is willing ot continue with medical management and his hope is that he can be discharged from the hospital and pursue functional recovery through rehab. He understands that recovery would be a prolonged process if it can occur. Patient reiterates limitations in his care: DNR, DNI, no major surgical interventions. Explained that patient may not fully recover with only medical management and patient/family expressed understanding. Emphasized that if patient decompensates despite current medical management, then the rceommendation would be to steer completely into a symptom-directed plan of care.

## 2025-05-15 NOTE — CONSULT NOTE ADULT - PROBLEM SELECTOR RECOMMENDATION 9
PPSV = 40%, requires assistance for most ADLs  -PT Eval  -c/w supportive care, OOB, encourage movement

## 2025-05-15 NOTE — CONSULT NOTE ADULT - PROBLEM SELECTOR RECOMMENDATION 4
Patient is DNR/DNI, DARRIAN in chart  -family is acting collectively to assist with decision making  -see GOC note: if patient is decompensating, then will discuss a transition to symptom-directed/comfort care and hospice    In addition to the E/M visit, an advance care planning meeting was performed. Start time: 11:00AM; End time: 11:20AM; Total time: 20min. A face to face meeting to discuss advance care planning was held today regarding: YEU MANE. Primary decision maker: Patient is able to participate in decision making; Alternate/surrogate: . Discussed advance directives including, but not limited to, healthcare proxy and code status. Decision regarding code status: DNR/DNI; Documentation completed today: [facility] note

## 2025-05-15 NOTE — CONSULT NOTE ADULT - SUBJECTIVE AND OBJECTIVE BOX
[facility] Geriatrics and Palliative Medicine  Praneeth Whitley, Palliative Care Attending  Contact Info: Call [phone] (HEAL Line) or message on Microsoft Teams (Praneeth Whitley)    HPI:  79M with PMHx of HTN, HLD, T2DM, acute cholecystectomy c/b pleural effusions requiring drainage who presented to [facility] with sob and weakness x 2 weeks. In ED, he was found to have elevated troponins, Cr 1.9 (baseline 0.75) [place] [address] and transaminitisHe was hypoxic in ED with CT chest showing multifocal PNA and acute respiratory failure. He was placed on HFNC and transferred to ICU. Blood cultures were + for gram positive cocci. TTE showed reduce LVEF with thickening of AV leaflets concerning for vegetation. Steroids were started.  He was placed on Vanc/Zosyn/Azithromycin and is now transfererd to [facility] for mgmt.      (09 May 2025 14:11)    Patient seen and examined at bedside. Appears overtly comfortable. Denies any significant complaint. Comprehensive symptom assessment and GOC exploration as noted below. Further collateral obtained from primary team, chart, and family.    PERTINENT PM/SXH:   HTN (hypertension)    HLD (hyperlipidemia)    Pneumonia    Aortic valve endocarditis      H/O appendicostomy    History of cholecystectomy      FAMILY HISTORY:    No history of  in first degree relatives  Unable to obtain due to encephalopathy    ITEMS NOT CHECKED ARE NOT PRESENT  SOCIAL HISTORY:   Significant other/partner:  []  Children:  []  Substance hx:  []   Tobacco hx:  []   Alcohol hx: []   Home Opioid hx:  [] I-Stop Reference No:  - no active Rx's / see chart note  Living Situation: []Home  []Long term care  []Rehab []Other  Tenriism/Spiritual practice: ; Role of organized Druze [] important [] some [] unable to assess    ADVANCE DIRECTIVES:    []MOLST  []Living Will  DECISION MAKER(s):  [] Health Care Proxy(s)  [] Surrogate(s)  [] Guardian           Name(s)/Phone Number(s):     BASELINE (I)ADLs (prior to admission):  [place]: []Total  [] Moderate []Dependent    ALLERGIES:  No Known Allergies    MEDICATIONS  (STANDING):  ampicillin  IVPB      ampicillin  IVPB 2 Gram(s) IV Intermittent every 6 hours  aspirin  chewable 81 milliGRAM(s) Oral daily  buMETAnide Injectable 1 milliGRAM(s) IV Push every 8 hours  cefTRIAXone   IVPB 2000 milliGRAM(s) IV Intermittent every 12 hours  chlorhexidine 2% Cloths 1 Application(s) Topical <User Schedule>  dextrose 5%. 1000 milliLiter(s) (50 mL/Hr) IV Continuous <Continuous>  dextrose 5%. 1000 milliLiter(s) (100 mL/Hr) IV Continuous <Continuous>  dextrose 50% Injectable 50 milliLiter(s) IV Push every 15 minutes  DOBUTamine Infusion 1 MICROgram(s)/kG/Min (2.24 mL/Hr) IV Continuous <Continuous>  escitalopram 10 milliGRAM(s) Oral at bedtime  glucagon  Injectable 1 milliGRAM(s) IntraMuscular once  heparin   Injectable 5000 Unit(s) SubCutaneous every 8 hours  insulin glargine Injectable (LANTUS) 20 Unit(s) SubCutaneous at bedtime  insulin regular  human corrective regimen sliding scale   SubCutaneous every 6 hours  insulin regular  human recombinant 8 Unit(s) SubCutaneous every 6 hours  melatonin 5 milliGRAM(s) Oral at bedtime  pantoprazole    Tablet 40 milliGRAM(s) Oral before breakfast  polyethylene glycol 3350 17 Gram(s) Oral every 24 hours  senna 2 Tablet(s) Oral at bedtime  sodium chloride 0.9% lock flush 3 milliLiter(s) IV Push every 8 hours    MEDICATIONS  (PRN):  dextrose Oral Gel 15 Gram(s) Oral once PRN Blood Glucose LESS THAN 70 milliGRAM(s)/deciliter    Analgesic Use (Scheduled and PRNs) for past 24 hours:    escitalopram   10 milliGRAM(s) Oral (05-14-25 @ 21:18)    melatonin   5 milliGRAM(s) Oral (05-14-25 @ 21:17)      PRESENT SYMPTOMS: []Unable to obtain due to poor mentation/encephalopathy  Source if other than patient:  []Family   []Team     Pain: [] yes [] no  QOL impact -   Location -                    Aggravating Factors -  Quality -  Radiation -  Timing -  Severity (0-10 scale) -   Minimal Acceptable Level (0-10 scale) -    Other Symptoms: See ESAS Section Below  [x]All other review of systems negative     GENERAL:  [] NAD []Alert []Lethargic  []Cachexia  []Unarousable  []Verbal  []Non-Verbal  BEHAVIORAL:   []Anxiety  []Delirium []Agitation []Cooperative []Oriented x  HEENT:  []Normal  [] Moist Mucous Membranes []Dry mouth   []ET Tube/Trach  []Oral lesions  PULMONARY:   []Clear []Tachypnea  []Audible excessive secretions  []Normal Work of Breathing []Labored Breathing  []Rhonchi []Crackles []Wheezing  CARDIOVASCULAR:    []Regular Rate []Regular Rhythm []Irregular []Tachy  []Jorge L  GASTROINTESTINAL:  []Soft  []Distended   []+BS  []Non tender []Tender  []PEG []OGT/ NGT  Last BM:  GENITOURINARY:  []Normal [] Incontinent   []Oliguria/Anuria   []Campuzano  MUSCULOSKELETAL:   []Normal Extremities  []Weakness  []Bed/Wheelchair bound []Edema  NEUROLOGIC:   []No focal deficits  []Cognitive impairment  []Dysphagia []Dysarthria []Paresis []Encephalopathic  SKIN:   []Normal   []Pressure ulcer(s)  []Rash    CRITICAL CARE:  [ ]Shock Present  [ ]Septic [ ]Cardiogenic [ ]Neurologic [ ]Hypovolemic  [ ] Vasopressors [ ]Inotropes   [ ]Respiratory failure present [ ]Mechanical Ventilation [ ]Non-invasive ventilatory support [ ]High-Flow  [ ]Acute  [ ]Chronic [ ]Hypoxic  [ ]Hypercarbic   [ ]Other organ failure    Vital Signs Last 24 Hrs  T(C): 36.1 (15 May 2025 09:02), Max: 37 (14 May 2025 22:22)  T(F): 97 (15 May 2025 09:02), Max: 98.6 (14 May 2025 22:22)  HR: 95 (15 May 2025 11:00) (87 - 106)  BP: --  BP(mean): --  RR: 18 (15 May 2025 11:00) (14 - 18)  SpO2: 98% (15 May 2025 11:00) (95% - 99%)    Parameters below as of 15 May 2025 11:00  Patient On (Oxygen Delivery Method): nasal cannula, high flow  O2 Flow (L/min): 40  O2 Concentration (%): 40     LABS: Personally reviewed and interpreted                        11.1   1.50  )-----------( 122      ( 15 May 2025 03:39 )             33.2   05-15    147[H]  |  105  |  45[H]  ----------------------------<  152[H]  3.8   |  31  |  1.36[H]    Ca    8.9      15 May 2025 03:39  Phos  3.3     05-15  Mg     1.9     05-15    TPro  5.7[L]  /  Alb  2.4[L]  /  TBili  0.3  /  DBili  x   /  AST  60[H]  /  ALT  72[H]  /  AlkPhos  78  05-15    RADIOLOGY & ADDITIONAL STUDIES: Personally reviewed and interpreted    DISCUSSION OF CASE: Family - to provide updates and emotional support; Primary Team/RN - to discuss plan of care [facility] Geriatrics and Palliative Medicine  Praneeth Whitley, Palliative Care Attending  Contact Info: Call [phone] (HEAL Line) or message on [employer] Teams (Praneeth Whitley)    HPI:  79M with PMHx of HTN, HLD, T2DM, acute cholecystectomy c/b pleural effusions requiring drainage who presented to [facility] with sob and weakness x 2 weeks. In ED, he was found to have elevated troponins, Cr 1.9 (baseline 0.75) [place] of [address] and transaminitisHe was hypoxic in ED with CT chest showing multifocal PNA and acute respiratory failure. He was placed on HFNC and transferred to ICU. Blood cultures were + for gram positive cocci. TTE showed reduce LVEF with thickening of AV leaflets concerning for vegetation. Steroids were started.  He was placed on Vanc/Zosyn/Azithromycin and is now transfererd to [facility] for mgmt.      (09 May 2025 14:11)    Patient seen and examined at bedside. Appears overtly comfortable. Denies any significant complaint. Comprehensive symptom assessment and GOC exploration as noted below. Further collateral obtained from primary team, chart, and family.    PERTINENT PM/SXH:   HTN (hypertension)    HLD (hyperlipidemia)    Pneumonia    Aortic valve endocarditis      H/O appendicostomy    History of cholecystectomy      FAMILY HISTORY:    No history of  in first degree relatives  Unable to obtain due to encephalopathy    ITEMS NOT CHECKED ARE NOT PRESENT  SOCIAL HISTORY:   Significant other/partner:  []  Children:  []  Substance hx:  []   Tobacco hx:  []   Alcohol hx: []   Home Opioid hx:  [] I-Stop Reference No: [id number]  - no active Rx's  Living Situation: []Home  []Long term care  []Rehab []Other  Shinto/Spiritual practice: ; Role of organized Uatsdin [] important [] some [] unable to assess    ADVANCE DIRECTIVES:    []MOLST  []Living Will  DECISION MAKER(s):  [] Health Care Proxy(s)  [] Surrogate(s)  [] Guardian           Name(s)/Phone Number(s):     BASELINE (I)ADLs (prior to admission):  [place]: []Total  [] Moderate []Dependent    ALLERGIES:  No Known Allergies    MEDICATIONS  (STANDING):  ampicillin  IVPB      ampicillin  IVPB 2 Gram(s) IV Intermittent every 6 hours  aspirin  chewable 81 milliGRAM(s) Oral daily  buMETAnide Injectable 1 milliGRAM(s) IV Push every 8 hours  cefTRIAXone   IVPB 2000 milliGRAM(s) IV Intermittent every 12 hours  chlorhexidine 2% Cloths 1 Application(s) Topical <User Schedule>  dextrose 5%. 1000 milliLiter(s) (50 mL/Hr) IV Continuous <Continuous>  dextrose 5%. 1000 milliLiter(s) (100 mL/Hr) IV Continuous <Continuous>  dextrose 50% Injectable 50 milliLiter(s) IV Push every 15 minutes  DOBUTamine Infusion 1 MICROgram(s)/kG/Min (2.24 mL/Hr) IV Continuous <Continuous>  escitalopram 10 milliGRAM(s) Oral at bedtime  glucagon  Injectable 1 milliGRAM(s) IntraMuscular once  heparin   Injectable 5000 Unit(s) SubCutaneous every 8 hours  insulin glargine Injectable (LANTUS) 20 Unit(s) SubCutaneous at bedtime  insulin regular  human corrective regimen sliding scale   SubCutaneous every 6 hours  insulin regular  human recombinant 8 Unit(s) SubCutaneous every 6 hours  melatonin 5 milliGRAM(s) Oral at bedtime  pantoprazole    Tablet 40 milliGRAM(s) Oral before breakfast  polyethylene glycol 3350 17 Gram(s) Oral every 24 hours  senna 2 Tablet(s) Oral at bedtime  sodium chloride 0.9% lock flush 3 milliLiter(s) IV Push every 8 hours    MEDICATIONS  (PRN):  dextrose Oral Gel 15 Gram(s) Oral once PRN Blood Glucose LESS THAN 70 milliGRAM(s)/deciliter    Analgesic Use (Scheduled and PRNs) for past 24 hours:    escitalopram   10 milliGRAM(s) Oral (05-14-25 @ 21:18)    melatonin   5 milliGRAM(s) Oral (05-14-25 @ 21:17)      PRESENT SYMPTOMS: []Unable to obtain due to poor mentation/encephalopathy  Source if other than patient:  []Family   []Team     Pain: [] yes [] no  QOL impact -   Location -                    Aggravating Factors -  Quality -  Radiation -  Timing -  Severity (0-10 scale) -   Minimal Acceptable Level (0-10 scale) -    Other Symptoms: See ESAS Section Below  [x]All other review of systems negative     GENERAL:  [] NAD []Alert []Lethargic  []Cachexia  []Unarousable  []Verbal  []Non-Verbal  BEHAVIORAL:   []Anxiety  []Delirium []Agitation []Cooperative []Oriented x  HEENT:  []Normal  [] Moist Mucous Membranes []Dry mouth   []ET Tube/Trach  []Oral lesions  PULMONARY:   []Clear []Tachypnea  []Audible excessive secretions  []Normal Work of Breathing []Labored Breathing  []Rhonchi []Crackles []Wheezing  CARDIOVASCULAR:    []Regular Rate []Regular Rhythm []Irregular []Tachy  []Jorge L  GASTROINTESTINAL:  []Soft  []Distended   []+BS  []Non tender []Tender  []PEG []OGT/ NGT  Last BM:  GENITOURINARY:  []Normal [] Incontinent   []Oliguria/Anuria   []Campuzano  MUSCULOSKELETAL:   []Normal Extremities  []Weakness  []Bed/Wheelchair bound []Edema  NEUROLOGIC:   []No focal deficits  []Cognitive impairment  []Dysphagia []Dysarthria []Paresis []Encephalopathic  SKIN:   []Normal   []Pressure ulcer(s)  []Rash    CRITICAL CARE:  [ ]Shock Present  [ ]Septic [ ]Cardiogenic [ ]Neurologic [ ]Hypovolemic  [ ] Vasopressors [ ]Inotropes   [ ]Respiratory failure present [ ]Mechanical Ventilation [ ]Non-invasive ventilatory support [ ]High-Flow  [ ]Acute  [ ]Chronic [ ]Hypoxic  [ ]Hypercarbic   [ ]Other organ failure    Vital Signs Last 24 Hrs  T(C): 36.1 (15 May 2025 09:02), Max: 37 (14 May 2025 22:22)  T(F): 97 (15 May 2025 09:02), Max: 98.6 (14 May 2025 22:22)  HR: 95 (15 May 2025 11:00) (87 - 106)  BP: --  BP(mean): --  RR: 18 (15 May 2025 11:00) (14 - 18)  SpO2: 98% (15 May 2025 11:00) (95% - 99%)    Parameters below as of 15 May 2025 11:00  Patient On (Oxygen Delivery Method): nasal cannula, high flow  O2 Flow (L/min): 40  O2 Concentration (%): 40     LABS: Personally reviewed and interpreted                        11.1   1.50  )-----------( 122      ( 15 May 2025 03:39 )             33.2   05-15    147[H]  |  105  |  45[H]  ----------------------------<  152[H]  3.8   |  31  |  1.36[H]    Ca    8.9      15 May 2025 03:39  Phos  3.3     05-15  Mg     1.9     05-15    TPro  5.7[L]  /  Alb  2.4[L]  /  TBili  0.3  /  DBili  x   /  AST  60[H]  /  ALT  72[H]  /  AlkPhos  78  05-15    RADIOLOGY & ADDITIONAL STUDIES: Personally reviewed and interpreted    DISCUSSION OF CASE: Family - to provide updates and emotional support; Primary Team/RN - to discuss plan of care [facility] Geriatrics and Palliative Medicine  Praneeth Whitley, Palliative Care Attending  Contact Info: Call [phone] (HEAL Line) or message on [employer] Teams (Praneeth Whitley)    HPI:  79M with PMHx of HTN, HLD, T2DM, acute cholecystectomy c/b pleural effusions requiring drainage who presented to [facility] with sob and weakness x 2 weeks. In ED, he was found to have elevated troponins, Cr 1.9 (baseline 0.75) [place] [address] and transaminitisHe was hypoxic in ED with CT chest showing multifocal PNA and acute respiratory failure. He was placed on HFNC and transferred to ICU. Blood cultures were + for gram positive cocci. TTE showed reduce LVEF with thickening of AV leaflets concerning for vegetation. Steroids were started.  He was placed on Vanc/Zosyn/Azithromycin and is now transfererd to [facility] for mgmt.     Patient seen and examined at bedside. Appears overtly comfortable. Denies any significant complaint, no pain/SOB/nausea. States he is hungry. Comprehensive symptom assessment and GOC exploration as noted below. Further collateral obtained from primary team, chart, and family.    PERTINENT PM/SXH:   HTN (hypertension)  HLD (hyperlipidemia)  Pneumonia  Aortic valve endocarditis  H/O appendicostomy  History of cholecystectomy    FAMILY HISTORY:  No history of CHF in first degree relatives    ITEMS NOT CHECKED ARE NOT PRESENT  SOCIAL HISTORY:   Significant other/partner:  [x]  Children:  [x]  Substance hx:  []   Tobacco hx:  []   Alcohol hx: []   Home Opioid hx:  [] I-Stop Reference No: [id number]  - no active Rx's  Living Situation: [x]Home  []Long term care  []Rehab []Other  Yazidi/Spiritual practice: ; Role of organized Rastafarian [] important [] some [] unable to assess    ADVANCE DIRECTIVES:    [x]MOLST  []Living Will  DECISION MAKER(s):  [?] Health Care Proxy(s)  [] Surrogate(s)  [] Guardian           Name(s)/Phone Number(s): Son    BASELINE (I)ADLs (prior to admission):  [place]: []Total  [x] Moderate []Dependent    ALLERGIES:  No Known Allergies    MEDICATIONS  (STANDING):  ampicillin  IVPB      ampicillin  IVPB 2 Gram(s) IV Intermittent every 6 hours  aspirin  chewable 81 milliGRAM(s) Oral daily  buMETAnide Injectable 1 milliGRAM(s) IV Push every 8 hours  cefTRIAXone   IVPB 2000 milliGRAM(s) IV Intermittent every 12 hours  chlorhexidine 2% Cloths 1 Application(s) Topical <User Schedule>  dextrose 5%. 1000 milliLiter(s) (50 mL/Hr) IV Continuous <Continuous>  dextrose 5%. 1000 milliLiter(s) (100 mL/Hr) IV Continuous <Continuous>  dextrose 50% Injectable 50 milliLiter(s) IV Push every 15 minutes  DOBUTamine Infusion 1 MICROgram(s)/kG/Min (2.24 mL/Hr) IV Continuous <Continuous>  escitalopram 10 milliGRAM(s) Oral at bedtime  glucagon  Injectable 1 milliGRAM(s) IntraMuscular once  heparin   Injectable 5000 Unit(s) SubCutaneous every 8 hours  insulin glargine Injectable (LANTUS) 20 Unit(s) SubCutaneous at bedtime  insulin regular  human corrective regimen sliding scale   SubCutaneous every 6 hours  insulin regular  human recombinant 8 Unit(s) SubCutaneous every 6 hours  melatonin 5 milliGRAM(s) Oral at bedtime  pantoprazole    Tablet 40 milliGRAM(s) Oral before breakfast  polyethylene glycol 3350 17 Gram(s) Oral every 24 hours  senna 2 Tablet(s) Oral at bedtime  sodium chloride 0.9% lock flush 3 milliLiter(s) IV Push every 8 hours    MEDICATIONS  (PRN):  dextrose Oral Gel 15 Gram(s) Oral once PRN Blood Glucose LESS THAN 70 milliGRAM(s)/deciliter    Analgesic Use (Scheduled and PRNs) for past 24 hours:  escitalopram   10 milliGRAM(s) Oral (05-14-25 @ 21:18)  melatonin   5 milliGRAM(s) Oral (05-14-25 @ 21:17)    PRESENT SYMPTOMS: []Unable to obtain due to poor mentation/encephalopathy  Source if other than patient:  []Family   []Team     Pain: [] yes [x] no  QOL impact -   Location -                    Aggravating Factors -  Quality -  Radiation -  Timing -  Severity (0-10 scale) -   Minimal Acceptable Level (0-10 scale) -    Other Symptoms: See ESAS Section Below  [x]All other review of systems negative     GENERAL:  [x] NAD [x]Alert []Lethargic  []Cachexia  []Unarousable  [x]Verbal  []Non-Verbal  BEHAVIORAL:   []Anxiety  []Delirium []Agitation [x]Cooperative [x]Oriented x3  HEENT:  [x]Normal  [x] Moist Mucous Membranes []Dry mouth   []ET Tube/Trach  []Oral lesions  PULMONARY:   []Clear []Tachypnea  []Audible excessive secretions  [x]Normal Work of Breathing []Labored Breathing  []Rhonchi []Crackles []Wheezing  CARDIOVASCULAR:    [x]Regular Rate []Regular Rhythm []Irregular []Tachy  []Jorge L  GASTROINTESTINAL:  [x]Soft  []Distended   []+BS  [x]Non tender []Tender  []PEG [x]OGT/ NGT  Last BM:  GENITOURINARY:  [x]Normal [] Incontinent   []Oliguria/Anuria   []Campuzano  MUSCULOSKELETAL:   [x]Normal Extremities  [x]Weakness  []Bed/Wheelchair bound []Edema  NEUROLOGIC:   [x]No focal deficits  []Cognitive impairment  []Dysphagia []Dysarthria []Paresis []Encephalopathic  SKIN:   [x]Normal   []Pressure ulcer(s)  []Rash    CRITICAL CARE:  [ ]Shock Present  [ ]Septic [ ]Cardiogenic [ ]Neurologic [ ]Hypovolemic  [ ] Vasopressors [ ]Inotropes   [x]Respiratory failure present [ ]Mechanical Ventilation [ ]Non-invasive ventilatory support [x]High-Flow  [x]Acute  [ ]Chronic [ ]Hypoxic  [ ]Hypercarbic   [ ]Other organ failure    Vital Signs Last 24 Hrs  T(C): 36.1 (15 May 2025 09:02), Max: 37 (14 May 2025 22:22)  T(F): 97 (15 May 2025 09:02), Max: 98.6 (14 May 2025 22:22)  HR: 95 (15 May 2025 11:00) (87 - 106)  BP: --  BP(mean): --  RR: 18 (15 May 2025 11:00) (14 - 18)  SpO2: 98% (15 May 2025 11:00) (95% - 99%)    Parameters below as of 15 May 2025 11:00  Patient On (Oxygen Delivery Method): nasal cannula, high flow  O2 Flow (L/min): 40  O2 Concentration (%): 40     LABS: Personally reviewed and interpreted                      11.1   1.50  )-----------( 122      ( 15 May 2025 03:39 )             33.2   05-15    147[H]  |  105  |  45[H]  ----------------------------<  152[H]  3.8   |  31  |  1.36[H]    Ca    8.9      15 May 2025 03:39  Phos  3.3     05-15  Mg     1.9     05-15  TPro  5.7[L]  /  Alb  2.4[L]  /  TBili  0.3  /  DBili  x   /  AST  60[H]  /  ALT  72[H]  /  AlkPhos  78  05-15    RADIOLOGY & ADDITIONAL STUDIES: Personally reviewed and interpreted  < from: Xray Chest 1 View- PORTABLE-Routine (Xray Chest 1 View- PORTABLE-Routine in AM.) (05.15.25 @ 03:50) >  Increased opacification right lung base probably secondary to   increasing/shifting right pleural effusion. Perihilar/upper lung   infiltrates grossly similar to prior exam 5/14/2025. No pneumothorax.   Nasogastric tube courses below hemidiaphragm.    DISCUSSION OF CASE: Wife, Son - to provide updates and emotional support; Primary Team/RN - to discuss plan of care

## 2025-05-15 NOTE — CONSULT NOTE ADULT - PROBLEM SELECTOR RECOMMENDATION 3
Ongoing Abx treatment, hemodynamically stable  -patient states surgical intervention would not be within GOC  -discussed the possibility that infection may not clear with just medical management

## 2025-05-15 NOTE — PROGRESS NOTE ADULT - NUTRITIONAL ASSESSMENT
This patient has been assessed with a concern for Malnutrition and has been determined to have a diagnosis/diagnoses of Moderate protein-calorie malnutrition.    This patient is being managed with:   Diet Pureed-  Consistent Carbohydrate {No Snacks} (CSTCHO)  DASH/TLC {Sodium & Cholesterol Restricted} (DASH)  Tube Feeding Modality: Nasogastric  Glucerna 1.2 Tyree (GLUCERNARTH)  Total Volume for 24 Hours (mL): 1680  Total Number of Cans: 7  Intermittent  Until Goal Tube Feed Rate (mL per Hour): 105  Tube Feeding Hours ON: 16  Tube Feeding OFF (Hours): 8  Tube Feed Start Time: 18:00  Entered: May 14 2025  1:51PM  
This patient has been assessed with a concern for Malnutrition and has been determined to have a diagnosis/diagnoses of Moderate protein-calorie malnutrition.    This patient is being managed with:   Diet Pureed-  Consistent Carbohydrate {No Snacks} (CSTCHO)  DASH/TLC {Sodium & Cholesterol Restricted} (DASH)  Tube Feeding Modality: Nasogastric  Glucerna 1.2 Tyree (GLUCERNARTH)  Total Volume for 24 Hours (mL): 1680  Total Number of Cans: 7  Intermittent  Until Goal Tube Feed Rate (mL per Hour): 105  Tube Feeding Hours ON: 16  Tube Feeding OFF (Hours): 8  Tube Feed Start Time: 18:00  Entered: May 14 2025  1:51PM

## 2025-05-15 NOTE — CONSULT NOTE ADULT - PROBLEM SELECTOR RECOMMENDATION 5
Complex medical decision making / symptom management in the setting of critical illness.    Will continue to follow for ongoing GOC discussion / titration of palliative regimen. Emotional support provided, questions answered.  Active Psychosocial Referrals:  [x]Social Work/Case management [x]PT/OT []Chaplaincy []Hospice  []Childlife []Holistic RN [x]Massage Therapy [x]Music Therapy []Ethics  Coping: [] well [x] with difficulty [] poor coping [] unable to assess  Support system: [x] strong [] adequate [] inadequate    For new or uncontrolled symptoms, please call Palliative Care at [phone]). The service is available 24/7 (including nights & weekends) to provide symptom management recommendations over the phone as appropriate

## 2025-05-15 NOTE — CONSULT NOTE ADULT - PROBLEM SELECTOR RECOMMENDATION 2
-recommend Tylenol 1000mg PO q6h PRN for Mild/Moderate Pain  -if patient develops severe pain or is actively decompensating, then recommend Dilaudid 0.5mg IV q2h PRN for Severe Pain or RR>22

## 2025-05-16 VITALS — HEART RATE: 108 BPM | OXYGEN SATURATION: 89 %

## 2025-05-16 LAB
ALBUMIN SERPL ELPH-MCNC: 2.5 G/DL — LOW (ref 3.3–5)
ALP SERPL-CCNC: 86 U/L — SIGNIFICANT CHANGE UP (ref 40–120)
ALT FLD-CCNC: 66 U/L — HIGH (ref 10–45)
ANION GAP SERPL CALC-SCNC: 15 MMOL/L — SIGNIFICANT CHANGE UP (ref 5–17)
APTT BLD: 29.5 SEC — SIGNIFICANT CHANGE UP (ref 26.1–36.8)
AST SERPL-CCNC: 57 U/L — HIGH (ref 10–40)
BILIRUB SERPL-MCNC: 0.3 MG/DL — SIGNIFICANT CHANGE UP (ref 0.2–1.2)
BUN SERPL-MCNC: 44 MG/DL — HIGH (ref 7–23)
CALCIUM SERPL-MCNC: 9.1 MG/DL — SIGNIFICANT CHANGE UP (ref 8.4–10.5)
CHLORIDE SERPL-SCNC: 102 MMOL/L — SIGNIFICANT CHANGE UP (ref 96–108)
CO2 SERPL-SCNC: 28 MMOL/L — SIGNIFICANT CHANGE UP (ref 22–31)
CREAT SERPL-MCNC: 1.3 MG/DL — SIGNIFICANT CHANGE UP (ref 0.5–1.3)
EGFR: 56 ML/MIN/1.73M2 — LOW
EGFR: 56 ML/MIN/1.73M2 — LOW
GLUCOSE SERPL-MCNC: 195 MG/DL — HIGH (ref 70–99)
GRAM STN FLD: ABNORMAL
HCT VFR BLD CALC: 33.4 % — LOW (ref 39–50)
HGB BLD-MCNC: 10.5 G/DL — LOW (ref 13–17)
INR BLD: 0.87 — SIGNIFICANT CHANGE UP (ref 0.85–1.16)
MAGNESIUM SERPL-MCNC: 1.9 MG/DL — SIGNIFICANT CHANGE UP (ref 1.6–2.6)
MCHC RBC-ENTMCNC: 29.4 PG — SIGNIFICANT CHANGE UP (ref 27–34)
MCHC RBC-ENTMCNC: 31.4 G/DL — LOW (ref 32–36)
MCV RBC AUTO: 93.6 FL — SIGNIFICANT CHANGE UP (ref 80–100)
NRBC BLD AUTO-RTO: 0 /100 WBCS — SIGNIFICANT CHANGE UP (ref 0–0)
PHOSPHATE SERPL-MCNC: 3.5 MG/DL — SIGNIFICANT CHANGE UP (ref 2.5–4.5)
PLATELET # BLD AUTO: 115 K/UL — LOW (ref 150–400)
POTASSIUM SERPL-MCNC: 3.7 MMOL/L — SIGNIFICANT CHANGE UP (ref 3.5–5.3)
POTASSIUM SERPL-SCNC: 3.7 MMOL/L — SIGNIFICANT CHANGE UP (ref 3.5–5.3)
PROT SERPL-MCNC: 5.7 G/DL — LOW (ref 6–8.3)
PROTHROM AB SERPL-ACNC: 10 SEC — SIGNIFICANT CHANGE UP (ref 9.9–13.4)
RBC # BLD: 3.57 M/UL — LOW (ref 4.2–5.8)
RBC # FLD: 18.3 % — HIGH (ref 10.3–14.5)
SODIUM SERPL-SCNC: 145 MMOL/L — SIGNIFICANT CHANGE UP (ref 135–145)
WBC # BLD: 1.24 K/UL — LOW (ref 3.8–10.5)
WBC # FLD AUTO: 1.24 K/UL — LOW (ref 3.8–10.5)

## 2025-05-16 PROCEDURE — 82330 ASSAY OF CALCIUM: CPT

## 2025-05-16 PROCEDURE — 99233 SBSQ HOSP IP/OBS HIGH 50: CPT

## 2025-05-16 PROCEDURE — 85610 PROTHROMBIN TIME: CPT

## 2025-05-16 PROCEDURE — 86900 BLOOD TYPING SEROLOGIC ABO: CPT

## 2025-05-16 PROCEDURE — 84295 ASSAY OF SERUM SODIUM: CPT

## 2025-05-16 PROCEDURE — 87205 SMEAR GRAM STAIN: CPT

## 2025-05-16 PROCEDURE — 83735 ASSAY OF MAGNESIUM: CPT

## 2025-05-16 PROCEDURE — 82962 GLUCOSE BLOOD TEST: CPT

## 2025-05-16 PROCEDURE — 71045 X-RAY EXAM CHEST 1 VIEW: CPT | Mod: 26,77

## 2025-05-16 PROCEDURE — 86923 COMPATIBILITY TEST ELECTRIC: CPT

## 2025-05-16 PROCEDURE — 84443 ASSAY THYROID STIM HORMONE: CPT

## 2025-05-16 PROCEDURE — 84132 ASSAY OF SERUM POTASSIUM: CPT

## 2025-05-16 PROCEDURE — 80061 LIPID PANEL: CPT

## 2025-05-16 PROCEDURE — 87324 CLOSTRIDIUM AG IA: CPT

## 2025-05-16 PROCEDURE — 87040 BLOOD CULTURE FOR BACTERIA: CPT

## 2025-05-16 PROCEDURE — 93312 ECHO TRANSESOPHAGEAL: CPT

## 2025-05-16 PROCEDURE — 74018 RADEX ABDOMEN 1 VIEW: CPT | Mod: 26

## 2025-05-16 PROCEDURE — 94003 VENT MGMT INPAT SUBQ DAY: CPT

## 2025-05-16 PROCEDURE — P9016: CPT

## 2025-05-16 PROCEDURE — G0545: CPT

## 2025-05-16 PROCEDURE — 92610 EVALUATE SWALLOWING FUNCTION: CPT

## 2025-05-16 PROCEDURE — 97163 PT EVAL HIGH COMPLEX 45 MIN: CPT

## 2025-05-16 PROCEDURE — 99238 HOSP IP/OBS DSCHRG MGMT 30/<: CPT

## 2025-05-16 PROCEDURE — 97165 OT EVAL LOW COMPLEX 30 MIN: CPT

## 2025-05-16 PROCEDURE — 85025 COMPLETE CBC W/AUTO DIFF WBC: CPT

## 2025-05-16 PROCEDURE — 81001 URINALYSIS AUTO W/SCOPE: CPT

## 2025-05-16 PROCEDURE — 87899 AGENT NOS ASSAY W/OPTIC: CPT

## 2025-05-16 PROCEDURE — 36430 TRANSFUSION BLD/BLD COMPNT: CPT

## 2025-05-16 PROCEDURE — 87070 CULTURE OTHR SPECIMN AEROBIC: CPT

## 2025-05-16 PROCEDURE — 80053 COMPREHEN METABOLIC PANEL: CPT

## 2025-05-16 PROCEDURE — 84484 ASSAY OF TROPONIN QUANT: CPT

## 2025-05-16 PROCEDURE — 84100 ASSAY OF PHOSPHORUS: CPT

## 2025-05-16 PROCEDURE — 86901 BLOOD TYPING SEROLOGIC RH(D): CPT

## 2025-05-16 PROCEDURE — 82803 BLOOD GASES ANY COMBINATION: CPT

## 2025-05-16 PROCEDURE — 82553 CREATINE MB FRACTION: CPT

## 2025-05-16 PROCEDURE — 87449 NOS EACH ORGANISM AG IA: CPT

## 2025-05-16 PROCEDURE — 99232 SBSQ HOSP IP/OBS MODERATE 35: CPT

## 2025-05-16 PROCEDURE — 86850 RBC ANTIBODY SCREEN: CPT

## 2025-05-16 PROCEDURE — 71045 X-RAY EXAM CHEST 1 VIEW: CPT

## 2025-05-16 PROCEDURE — 87077 CULTURE AEROBIC IDENTIFY: CPT

## 2025-05-16 PROCEDURE — 83880 ASSAY OF NATRIURETIC PEPTIDE: CPT

## 2025-05-16 PROCEDURE — 80202 ASSAY OF VANCOMYCIN: CPT

## 2025-05-16 PROCEDURE — 76705 ECHO EXAM OF ABDOMEN: CPT

## 2025-05-16 PROCEDURE — 87186 SC STD MICRODIL/AGAR DIL: CPT

## 2025-05-16 PROCEDURE — 83036 HEMOGLOBIN GLYCOSYLATED A1C: CPT

## 2025-05-16 PROCEDURE — 82550 ASSAY OF CK (CPK): CPT

## 2025-05-16 PROCEDURE — 83605 ASSAY OF LACTIC ACID: CPT

## 2025-05-16 PROCEDURE — 36415 COLL VENOUS BLD VENIPUNCTURE: CPT

## 2025-05-16 PROCEDURE — 84145 PROCALCITONIN (PCT): CPT

## 2025-05-16 PROCEDURE — 74018 RADEX ABDOMEN 1 VIEW: CPT

## 2025-05-16 PROCEDURE — 71045 X-RAY EXAM CHEST 1 VIEW: CPT | Mod: 26

## 2025-05-16 PROCEDURE — 94002 VENT MGMT INPAT INIT DAY: CPT

## 2025-05-16 PROCEDURE — P9045: CPT

## 2025-05-16 PROCEDURE — 85730 THROMBOPLASTIN TIME PARTIAL: CPT

## 2025-05-16 PROCEDURE — 85027 COMPLETE CBC AUTOMATED: CPT

## 2025-05-16 RX ORDER — MECLIZINE HCL 12.5 MG
12.5 TABLET ORAL EVERY 8 HOURS
Refills: 0 | Status: DISCONTINUED | OUTPATIENT
Start: 2025-05-16 | End: 2025-05-16

## 2025-05-16 RX ORDER — CYANOCOBALAMIN 1000 UG/ML
2 INJECTION INTRAMUSCULAR; SUBCUTANEOUS
Refills: 0 | DISCHARGE

## 2025-05-16 RX ORDER — HEPARIN SODIUM 1000 [USP'U]/ML
5000 INJECTION INTRAVENOUS; SUBCUTANEOUS
Qty: 1 | Refills: 0
Start: 2025-05-16 | End: 2025-06-14

## 2025-05-16 RX ORDER — MELATONIN 5 MG
1 TABLET ORAL
Qty: 0 | Refills: 0 | DISCHARGE
Start: 2025-05-16

## 2025-05-16 RX ORDER — HEPARIN SODIUM 1000 [USP'U]/ML
5000 INJECTION INTRAVENOUS; SUBCUTANEOUS EVERY 8 HOURS
Refills: 0 | Status: DISCONTINUED | OUTPATIENT
Start: 2025-05-16 | End: 2025-05-16

## 2025-05-16 RX ORDER — INSULIN GLARGINE-YFGN 100 [IU]/ML
20 INJECTION, SOLUTION SUBCUTANEOUS
Qty: 0 | Refills: 0 | DISCHARGE
Start: 2025-05-16

## 2025-05-16 RX ORDER — SENNA 187 MG
2 TABLET ORAL
Qty: 0 | Refills: 0 | DISCHARGE
Start: 2025-05-16

## 2025-05-16 RX ORDER — FERROUS SULFATE 137(45) MG
1 TABLET, EXTENDED RELEASE ORAL
Refills: 0 | DISCHARGE

## 2025-05-16 RX ORDER — BUMETANIDE 1 MG/1
1 TABLET ORAL
Refills: 0 | Status: DISCONTINUED | OUTPATIENT
Start: 2025-05-16 | End: 2025-05-16

## 2025-05-16 RX ORDER — DAPTOMYCIN 500 MG/10ML
750 INJECTION, POWDER, LYOPHILIZED, FOR SOLUTION INTRAVENOUS
Qty: 0 | Refills: 0 | DISCHARGE
Start: 2025-05-16

## 2025-05-16 RX ORDER — BUMETANIDE 1 MG/1
1 TABLET ORAL
Qty: 0 | Refills: 0 | DISCHARGE
Start: 2025-05-16

## 2025-05-16 RX ORDER — LOSARTAN POTASSIUM 100 MG/1
1 TABLET, FILM COATED ORAL
Refills: 0 | DISCHARGE

## 2025-05-16 RX ORDER — ICOSAPENT ETHYL 500 MG/1
2 CAPSULE ORAL
Refills: 0 | DISCHARGE

## 2025-05-16 RX ORDER — MECLIZINE HCL 12.5 MG
1 TABLET ORAL
Qty: 0 | Refills: 0 | DISCHARGE
Start: 2025-05-16

## 2025-05-16 RX ORDER — METFORMIN HYDROCHLORIDE 850 MG/1
1 TABLET ORAL
Refills: 0 | DISCHARGE

## 2025-05-16 RX ORDER — ESCITALOPRAM OXALATE 20 MG/1
1 TABLET ORAL
Qty: 0 | Refills: 0 | DISCHARGE
Start: 2025-05-16

## 2025-05-16 RX ORDER — DULAGLUTIDE 4.5 MG/.5ML
0.75 INJECTION, SOLUTION SUBCUTANEOUS
Refills: 0 | DISCHARGE

## 2025-05-16 RX ORDER — ASPIRIN 325 MG
1 TABLET ORAL
Refills: 0 | DISCHARGE

## 2025-05-16 RX ORDER — ASPIRIN 325 MG
1 TABLET ORAL
Qty: 0 | Refills: 0 | DISCHARGE
Start: 2025-05-16

## 2025-05-16 RX ORDER — BUMETANIDE 1 MG/1
1 TABLET ORAL ONCE
Refills: 0 | Status: DISCONTINUED | OUTPATIENT
Start: 2025-05-16 | End: 2025-05-16

## 2025-05-16 RX ADMIN — Medication 8 UNIT(S): at 00:31

## 2025-05-16 RX ADMIN — Medication 2: at 07:12

## 2025-05-16 RX ADMIN — Medication 8 UNIT(S): at 07:12

## 2025-05-16 RX ADMIN — Medication 3 MILLILITER(S): at 05:10

## 2025-05-16 RX ADMIN — Medication 3 MILLILITER(S): at 13:54

## 2025-05-16 RX ADMIN — Medication 40 MILLIEQUIVALENT(S): at 10:58

## 2025-05-16 RX ADMIN — BUMETANIDE 1 MILLIGRAM(S): 1 TABLET ORAL at 05:43

## 2025-05-16 RX ADMIN — BUMETANIDE 1 MILLIGRAM(S): 1 TABLET ORAL at 16:46

## 2025-05-16 RX ADMIN — Medication 81 MILLIGRAM(S): at 11:02

## 2025-05-16 RX ADMIN — DAPTOMYCIN 130 MILLIGRAM(S): 500 INJECTION, POWDER, LYOPHILIZED, FOR SOLUTION INTRAVENOUS at 17:42

## 2025-05-16 RX ADMIN — Medication 8 UNIT(S): at 12:16

## 2025-05-16 RX ADMIN — Medication 4: at 12:16

## 2025-05-16 RX ADMIN — HEPARIN SODIUM 5000 UNIT(S): 1000 INJECTION INTRAVENOUS; SUBCUTANEOUS at 05:44

## 2025-05-16 RX ADMIN — Medication 40 MILLIGRAM(S): at 05:44

## 2025-05-16 RX ADMIN — Medication 4: at 00:31

## 2025-05-16 RX ADMIN — HEPARIN SODIUM 5000 UNIT(S): 1000 INJECTION INTRAVENOUS; SUBCUTANEOUS at 14:45

## 2025-05-16 NOTE — DISCHARGE NOTE PROVIDER - NSDCCPCAREPLAN_GEN_ALL_CORE_FT
PRINCIPAL DISCHARGE DIAGNOSIS  Diagnosis: Endocarditis  Assessment and Plan of Treatment: Aortic Valve     PRINCIPAL DISCHARGE DIAGNOSIS  Diagnosis: Endocarditis  Assessment and Plan of Treatment: Aortic Valve Endocarditis  Continue Daptomycin every 24 hours.  Last Blood cultures drawn on 5/16/25, follow up results.  Blood cultures from 5/13/24, positive on 5/15/24 for gram positive cocci.

## 2025-05-16 NOTE — DISCHARGE NOTE NURSING/CASE MANAGEMENT/SOCIAL WORK - FINANCIAL ASSISTANCE
[facility] provides services at a reduced cost to those who are determined to be eligible through [facility]’s financial assistance program. Information regarding [facility]’s financial assistance program can be found by going to [email or web address] or by calling [phone].

## 2025-05-16 NOTE — DISCHARGE NOTE PROVIDER - INSTRUCTIONS
Diet, Pureed:   Consistent Carbohydrate {No Snacks} (CSTCHO)  Tube Feeding Modality: Nasogastric  Glucerna 1.2 Tyree (GLUCERNARTH)  Total Volume for 24 Hours (mL): 1680  Total Number of Cans: 7  Continuous  Starting Tube Feed Rate {mL per Hour}: 85  Increase Tube Feed Rate by (mL): 10     Every 4 hours  Until Goal Tube Feed Rate (mL per Hour): 105  Tube Feed Duration (in Hours): 16  Tube Feed Start Time: 18:00  Tube Feed Stop Time: 10:00  Supplement Feeding Modality:  Oral  Ensure Max Cans or Servings Per Day:  1       Frequency:  Two Times a day (05-16-25 @ 13:43) [Active]

## 2025-05-16 NOTE — DISCHARGE NOTE PROVIDER - NSDCMRMEDTOKEN_GEN_ALL_CORE_FT
aspirin 81 mg oral capsule: 1 cap(s) orally  bifidobacterium infantis: 60 milligram(s) once a day  cyanocobalamin 500 mcg oral tablet, chewable: 2 tab(s) chewed  dulaglutide 0.75 mg/0.5 mL subcutaneous solution: 0.75 milligram(s) subcutaneously once a week  FeroSul 325 mg (65 mg elemental iron) oral tablet: 1 tab(s) orally  icosapent 1 g oral capsule: 2 cap(s) orally  losartan 50 mg oral tablet: 1 tab(s) orally once a day  metFORMIN 500 mg oral tablet: 1 tab(s) orally 2 times a day   aspirin 81 mg oral tablet, chewable: 1 tab(s) orally once a day  bumetanide 0.25 mg/mL injectable solution: 1 milligram(s) injectable 3 times a day  DAPTOmycin 500 mg intravenous injection: 750 milligram(s) intravenous every 24 hours  escitalopram 10 mg oral tablet: 1 tab(s) orally once a day (at bedtime)  insulin glargine 100 units/mL subcutaneous solution: 20 unit(s) subcutaneous once a day (at bedtime)  meclizine 12.5 mg oral tablet: 1 tab(s) orally every 8 hours As needed Dizziness  melatonin 5 mg oral tablet: 1 tab(s) orally once a day (at bedtime)  pantoprazole 40 mg oral delayed release tablet: 1 tab(s) orally once a day (before a meal)  senna leaf extract oral tablet: 2 tab(s) orally once a day (at bedtime)

## 2025-05-16 NOTE — PROGRESS NOTE ADULT - PROBLEM SELECTOR PLAN 5
Complex medical decision making / symptom management in the setting of critical illness.    Will continue to follow for ongoing GOC discussion / titration of palliative regimen. Emotional support provided, questions answered.  Active Psychosocial Referrals:  [x]Social Work/Case management [x]PT/OT []Chaplaincy []Hospice  []Childlife []Holistic RN [x]Massage Therapy [x]Music Therapy []Ethics  Coping: [] well [x] with difficulty [] poor coping [] unable to assess  Support system: [x] strong [] adequate [] inadequate    For new or uncontrolled symptoms, please call Palliative Care at [phone] ([phone]). The service is available 24/7 (including nights & weekends) to provide symptom management recommendations over the phone as appropriate

## 2025-05-16 NOTE — PROGRESS NOTE ADULT - ASSESSMENT
79M w/ PMHx of DM, HTN, HLD, hx of hospitalization in [place] (1/2025, 2/2025) for sepsis 2/2 acute   cholecystitis (s/p cholecystectomy), c/b postsurgical hematoma, b/l pleural effusions s/p thoracentesis of R pleural effusion on 3/3/25 at [facility] [pleural fluid and BCx neg at the time].  Pt son is an ED RN at [place] and tells me that pt had Enterococcal bacteremia while he was hospitalized in [place]. Source felt to be GI/cholecystitis at the time. Unsure of what Abx were given back then and for how long.   He subsequently went to rehab and eventually returned to [facility]. Was taken to [facility] for weakness and dyspnea x 2 wks with significant worsening 2d pta.  There he denied fever, chills, chest pain, nausea, vomiting, abdominal pain, diarrhea, dysuria; reported decreased   urination. There, he was tachypneic with O2 sats in the 80s on RA and low 90s on 4L O2 via NC. CXR with fluid overload vs pneumonia. WBC WNL with N predom, UA neg, UCx w <10k UGF.   CT c/a/p showed Interval development of multifocal central consolidative opacities throughout the lungs, particularly the upper lobes and left lower lobe, R pleural effusion was noted to have dec in size, new small L pleural effusion (complex per HIE crit care note), significant interval decrease in size of the subcapsular hematoma along the inferior aspect of the liver.  Patient reportedly met sepsis criteria and was admitted to the ICU for sepsis and was in resp distress. Was given Vanc/Piptazo/Azithro + stress steroids. Was on bipap.   5/8 BCx + E fecalis (S- Amp, Vanco).   5/9 BCx + (4/4) w E fecalis.   5/10 BCx + E fecalis  5/11 BCx + (1/2) GPC pairs and chains  5/12 BCx ngtd  5/13 BCx + E fecalis  Echo with aortic valve vegetations, EF 40-45%  Xferred to [facility] for possible surgical mgmt of AoV endocarditis and critical care.  Was given Amp/CTX for Enterococcal IE.  Family now opting for medical mgmt only, awaiting xfer to [facility].   Developed leukopenia presumably 2/2 beta lactam Abx.  Abx switched yesterday to Daptomycin.  WBC 1.5k today, .    #Enterococcal bacteremia and AoV (NV) endocarditis  - F/u surveillance BCx  - SHELLY when able if within goals of care  - Cont IV Daptomycin 10mg/kg 750 mg q24h   - He will need 6 wk Abx since BCx clearance for medical mgmt of IE    # Loose stools  - C diff neg    ID Team 1 will follow

## 2025-05-16 NOTE — DISCHARGE NOTE NURSING/CASE MANAGEMENT/SOCIAL WORK - PATIENT PORTAL LINK FT
You can access the FollowMyHealth Patient Portal offered by [facility] by registering at the following website: [email or web address]. By joining [facility]’s FollowMyHealth portal, you will also be able to view your health information using other applications (apps) compatible with our system.

## 2025-05-16 NOTE — PROGRESS NOTE ADULT - PROVIDER SPECIALTY LIST ADULT
Critical Care
Infectious Disease
Nephrology
Nephrology
Critical Care
Critical Care
Infectious Disease
Nephrology
Critical Care
Infectious Disease
Infectious Disease
Palliative Care

## 2025-05-16 NOTE — DISCHARGE NOTE PROVIDER - CARE PROVIDER_API CALL
Darin Hale  Internal Medicine  [address] [place] [address]  Phone: [phone]  Fax: [phone]  Follow Up Time:

## 2025-05-16 NOTE — CHART NOTE - NSCHARTNOTEFT_GEN_A_CORE
Chart reviewed. LANETTE resolving, sCr now 1.3. Non-oliguric. Can continue diuretics as needed to manage volume status. At time of discharge arrange for non-urgent outpatient Nephrology follow up. Nephrology will sign off at this time. Please reconsult if patient's clinical status changes. Discussed with attending Dr. Brown.

## 2025-05-16 NOTE — PROGRESS NOTE ADULT - SUBJECTIVE AND OBJECTIVE BOX
INTERVAL HPI/OVERNIGHT EVENTS: No acute events, pending xfer to [facility].     CONSTITUTIONAL:  No fever, chills, night sweats  EYES:  No photophobia or visual changes  CARDIOVASCULAR:  No chest pain  RESPIRATORY:  + cough, no wheezing, or SOB   GASTROINTESTINAL:  No nausea, vomiting, diarrhea, constipation, or abdominal pain  GENITOURINARY:  No frequency, urgency, dysuria or hematuria  NEUROLOGIC:  No headache, lightheadedness      ANTIBIOTICS/RELEVANT:    ampicillin  IVPB   216 mL/Hr IV Intermittent (05-10-25 @ 14:23)    ampicillin  IVPB   216 mL/Hr IV Intermittent (05-15-25 @ 11:36)   216 mL/Hr IV Intermittent (05-15-25 @ 05:52)   216 mL/Hr IV Intermittent (05-14-25 @ 23:28)   216 mL/Hr IV Intermittent (05-14-25 @ 17:07)   216 mL/Hr IV Intermittent (05-14-25 @ 11:00)   216 mL/Hr IV Intermittent (05-14-25 @ 05:16)   216 mL/Hr IV Intermittent (05-13-25 @ 23:00)   216 mL/Hr IV Intermittent (05-13-25 @ 18:43)   216 mL/Hr IV Intermittent (05-13-25 @ 11:04)   216 mL/Hr IV Intermittent (05-13-25 @ 06:21)   216 mL/Hr IV Intermittent (05-13-25 @ 00:04)   216 mL/Hr IV Intermittent (05-12-25 @ 17:46)   216 mL/Hr IV Intermittent (05-12-25 @ 11:08)   216 mL/Hr IV Intermittent (05-12-25 @ 06:42)   216 mL/Hr IV Intermittent (05-12-25 @ 00:52)   216 mL/Hr IV Intermittent (05-11-25 @ 18:10)   216 mL/Hr IV Intermittent (05-11-25 @ 11:12)   216 mL/Hr IV Intermittent (05-11-25 @ 06:23)   216 mL/Hr IV Intermittent (05-10-25 @ 23:50)   216 mL/Hr IV Intermittent (05-10-25 @ 18:13)    cefTRIAXone   IVPB   100 mL/Hr IV Intermittent (05-15-25 @ 05:52)   100 mL/Hr IV Intermittent (05-14-25 @ 17:28)   100 mL/Hr IV Intermittent (05-14-25 @ 05:16)   100 mL/Hr IV Intermittent (05-13-25 @ 17:47)   100 mL/Hr IV Intermittent (05-13-25 @ 06:21)   100 mL/Hr IV Intermittent (05-12-25 @ 17:51)   100 mL/Hr IV Intermittent (05-12-25 @ 05:41)   100 mL/Hr IV Intermittent (05-11-25 @ 17:15)   100 mL/Hr IV Intermittent (05-11-25 @ 06:23)   100 mL/Hr IV Intermittent (05-10-25 @ 19:20)    DAPTOmycin IVPB   130 mL/Hr IV Intermittent (05-16-25 @ 17:42)   130 mL/Hr IV Intermittent (05-15-25 @ 18:21)    piperacillin/tazobactam IVPB..   25 mL/Hr IV Intermittent (05-10-25 @ 05:14)            Vital Signs Last 24 Hrs  T(C): 36.9 (16 May 2025 16:50), Max: 37.1 (16 May 2025 14:00)  T(F): 98.5 (16 May 2025 16:50), Max: 98.8 (16 May 2025 14:00)  HR: 108 (16 May 2025 18:15) (93 - 108)  BP: 119/56 (16 May 2025 15:45) (116/54 - 124/60)  BP(mean): 81 (16 May 2025 15:45) (78 - 86)  RR: 19 (16 May 2025 12:00) (17 - 20)  SpO2: 89% (16 May 2025 18:15) (89% - 96%)    Parameters below as of 16 May 2025 18:15  Patient On (Oxygen Delivery Method): mask, nonrebreather  O2 Flow (L/min): 12  O2 Concentration (%): 100    PHYSICAL EXAM:  Constitutional:  Alert  Eyes:  Sclerae anicteric, conjunctivae clear, PERRL  Ear/Nose/Throat:  No nasal exudate or sinus tenderness;  Neck:  Supple, no adenopathy  Respiratory:  Clear bilaterally  Cardiovascular:  RRR, S1S2, no murmur appreciated  Gastrointestinal:  Symmetric, normoactive BS, soft, NT  Extremities:  No edema      LABS:                        10.5   1.24  )-----------( 115      ( 16 May 2025 06:57 )             33.4         05-16    145  |  102  |  44[H]  ----------------------------<  195[H]  3.7   |  28  |  1.30    Ca    9.1      16 May 2025 06:57  Phos  3.5     05-16  Mg     1.9     05-16    TPro  5.7[L]  /  Alb  2.5[L]  /  TBili  0.3  /  DBili  x   /  AST  57[H]  /  ALT  66[H]  /  AlkPhos  86  05-16      Urinalysis Basic - ( 16 May 2025 06:57 )    Color: x / Appearance: x / SG: x / pH: x  Gluc: 195 mg/dL / Ketone: x  / Bili: x / Urobili: x   Blood: x / Protein: x / Nitrite: x   Leuk Esterase: x / RBC: x / WBC x   Sq Epi: x / Non Sq Epi: x / Bacteria: x        MICROBIOLOGY:    Culture - Blood (collected 13 May 2025 08:58)  Source: Blood Blood-Peripheral  Gram Stain (15 May 2025 21:31):    Growth in anaerobic bottle: Gram Positive Cocci in Pairs and Chains  Preliminary Report (16 May 2025 16:01):    Growth in anaerobic bottle: Enterococcus faecalis    Culture - Blood (collected 12 May 2025 09:22)  Source: Blood Blood-Peripheral  Preliminary Report (16 May 2025 17:01):    No growth at 4 days    Culture - Blood (collected 11 May 2025 07:40)  Source: Blood Blood-Peripheral  Gram Stain (16 May 2025 01:20):    Growth in anaerobic bottle: Gram Positive Cocci in Pairs and Chains  Preliminary Report (16 May 2025 01:21):    Growth in anaerobic bottle: Gram Positive Cocci in Pairs and Chains    Culture - Blood (collected 10 May 2025 18:10)  Source: Blood Blood-Peripheral  Gram Stain (12 May 2025 07:37):    Growth in anaerobic bottle: Gram Positive Cocci in Pairs and Chains    Growth in aerobic bottle: Gram Positive Cocci in Pairs and Chains  Final Report (13 May 2025 08:46):    Growth in aerobic and anaerobic bottles: Enterococcus faecalis    See previous culture [id number]    Culture - Bronchial (collected 09 May 2025 19:46)  Source: Bronchial Bronchial Lavage  Gram Stain (10 May 2025 12:20):    Rare polymorphonuclear leukocytes per low power field    Rare Squamous epithelial cells per low power field    No organisms seen per oil power field  Final Report (11 May 2025 14:13):    Commensal usha consistent with body site    Urinalysis with Rflx Culture (collected 09 May 2025 18:19)    Culture - Blood (collected 09 May 2025 17:21)  Source: Blood Blood-Peripheral  Gram Stain (10 May 2025 21:38):    Growth in aerobic and anaerobic bottles: Gram Positive Cocci in Pairs and    Chains  Final Report (11 May 2025 13:51):    Growth in aerobic and anaerobic bottles: Enterococcus faecalis    See previous culture [id number]    Culture - Blood (collected 09 May 2025 17:21)  Source: Blood Blood-Peripheral  Gram Stain (11 May 2025 00:58):    Growth in anaerobic bottle: Gram Positive Cocci in Pairs and Chains    Growth in aerobic bottle: Gram Positive Cocci in Pairs and Chains  Final Report (12 May 2025 12:30):    Growth in aerobic and anaerobic bottles: Enterococcus faecalis  Organism: Enterococcus faecalis (12 May 2025 12:30)  Organism: Enterococcus faecalis (12 May 2025 12:30)          RADIOLOGY & ADDITIONAL STUDIES:

## 2025-05-16 NOTE — OCCUPATIONAL THERAPY INITIAL EVALUATION ADULT - PERTINENT HX OF CURRENT PROBLEM, REHAB EVAL
79M with PMHx of HTN, HLD, T2DM, acute cholecystectomy c/b pleural effusions requiring drainage who presented to [facility] with sob and weakness x 2 weeks. In ED, he was found to have elevated troponins, Cr 1.9 (baseline 0.75) [place] [address] and transaminitisHe was hypoxic in ED with CT chest showing multifocal PNA and acute respiratory failure. He was placed on HFNC and transferred to ICU. Blood cultures were + for gram positive cocci. TTE showed reduce LVEF with thickening of AV leaflets concerning for vegetation. Steroids were started.  He was placed on Vanc/Zosyn/Azithromycin and is now transfererd to [facility] for mgmt.

## 2025-05-16 NOTE — OCCUPATIONAL THERAPY INITIAL EVALUATION ADULT - PHYSICAL ASSIST/NONPHYSICAL ASSIST: SIT/SUPINE, REHAB EVAL
Likely functional in the setting of dilated cardiomyopathy   verbal cues/nonverbal cues (demo/gestures)/2 person assist

## 2025-05-16 NOTE — OCCUPATIONAL THERAPY INITIAL EVALUATION ADULT - MODIFIED CLINICAL TEST OF SENSORY INTEGRATION IN BALANCE TEST
pt. tolerated dangle at EOB ~5 minutes with CGA/Min A w. initial R lean but self-corrected. Pt. reporting nausea and coughing so returned to bed, VSS

## 2025-05-16 NOTE — CHART NOTE - NSCHARTNOTEFT_GEN_A_CORE
Admitting Diagnosis:   Patient is a 79y old  Male who presents with a chief complaint of AORTIC VALVE ENDOCARDITI    AORTIC VALVE ENDOCARDITIS     (11 May 2025 18:34)      PAST MEDICAL & SURGICAL HISTORY:  HTN (hypertension)  HLD (hyperlipidemia)  Pneumonia  Aortic valve endocarditis  H/O appendicostomy  History of cholecystectomy          Current Nutrition Order:  Pureed, Consistent Carbohydrate, DASH/TLC  Tube feed via nasogastric tube: Glucerna 1.2 at 105mL/hr x 16 hours. This provides 1680mL total volume, 2016kcal, 101g protein, 1352mL free water.     PO Intake: Good (%) [   ]  Fair (50-75%) [ X ] Poor (<25%) [   ]    GI Issues: abdomen soft/nontender, fecal incontinent, +BM 5/16 per flowsheets     Pain: none noted     Skin Integrity: unstageable pressure injury to sacrum, 1+ generalized edema noted, Zacarias score 12        05-15-25 @ 07:01  -  05-16-25 @ 07:00  --------------------------------------------------------  IN: 1203.6 mL / OUT: 1500 mL / NET: -296.4 mL    05-16-25 @ 07:01  -  05-16-25 @ 13:13  --------------------------------------------------------  IN: 644 mL / OUT: 300 mL / NET: 344 mL        Labs:   05-16    145  |  102  |  44[H]  ----------------------------<  195[H]  3.7   |  28  |  1.30    Ca    9.1      16 May 2025 06:57  Phos  3.5     05-16  Mg     1.9     05-16    TPro  5.7[L]  /  Alb  2.5[L]  /  TBili  0.3  /  DBili  x   /  AST  57[H]  /  ALT  66[H]  /  AlkPhos  86  05-16    CAPILLARY BLOOD GLUCOSE      POCT Blood Glucose.: 204 mg/dL (16 May 2025 11:58)  POCT Blood Glucose.: 172 mg/dL (16 May 2025 06:27)  POCT Blood Glucose.: 223 mg/dL (16 May 2025 00:25)  POCT Blood Glucose.: 177 mg/dL (15 May 2025 21:15)  POCT Blood Glucose.: 170 mg/dL (15 May 2025 16:23)      Medications:  MEDICATIONS  (STANDING):  aspirin  chewable 81 milliGRAM(s) Oral daily  buMETAnide Injectable 1 milliGRAM(s) IV Push two times a day  DAPTOmycin IVPB 750 milliGRAM(s) IV Intermittent every 24 hours  dextrose 5%. 1000 milliLiter(s) (50 mL/Hr) IV Continuous <Continuous>  dextrose 5%. 1000 milliLiter(s) (100 mL/Hr) IV Continuous <Continuous>  dextrose 50% Injectable 50 milliLiter(s) IV Push every 15 minutes  escitalopram 10 milliGRAM(s) Oral at bedtime  glucagon  Injectable 1 milliGRAM(s) IntraMuscular once  heparin   Injectable 5000 Unit(s) SubCutaneous every 8 hours  insulin glargine Injectable (LANTUS) 20 Unit(s) SubCutaneous at bedtime  insulin regular  human corrective regimen sliding scale   SubCutaneous every 6 hours  insulin regular  human recombinant 8 Unit(s) SubCutaneous every 6 hours  melatonin 5 milliGRAM(s) Oral at bedtime  pantoprazole    Tablet 40 milliGRAM(s) Oral before breakfast  polyethylene glycol 3350 17 Gram(s) Oral every 24 hours  senna 2 Tablet(s) Oral at bedtime  sodium chloride 0.9% lock flush 3 milliLiter(s) IV Push every 8 hours    MEDICATIONS  (PRN):  dextrose Oral Gel 15 Gram(s) Oral once PRN Blood Glucose LESS THAN 70 milliGRAM(s)/deciliter      Anthropometrics:  Dosing height: 69in  Dosing weight: 74.7kg/164.7 pounds   BMI 24.3    pounds, %    Weight Change: no new weights since admit, recommend nursing to obtain weekly weights; RD to monitor trends as able.     Moderate Protein Calorie Malnutrition: Risk Notification Completed 5/14   -PO intake: meeting <75% EER >1 month  -Weight loss: 8% weight loss x 5 months   -NFPE: moderate muscle and fat wasting     Estimated energy needs:   Weight used for calculations	IBW  Estimated Energy Needs Weight (lbs)	160 lb  Estimated Energy Needs Weight (kg)	72.5 kg  Estimated Energy Needs From (alba/kg)	27  Estimated Energy Needs To (alba/kg)	32  Estimated Energy Needs Calculated From (alba/kg)	1958  Estimated Energy Needs Calculated To (alba/kg)	2320    Weight used for calculations	IBW  Estimated Protein Needs Weight (lbs)	160 lb  Estimated Protein Needs Weight (kg)	72.5 kg  Estimated Protein Needs From (g/kg)	1.2  Estimated Protein Needs To (g/kg)	1.5  Estimated Protein Needs Calculated From (g/kg)	87  Estimated Protein Needs Calculated To (g/kg)	108.75    Estimated Fluid Needs Weight (lbs)	160 lb  Estimated Fluid Needs Weight (kg)	72.5 kg  Estimated Fluid Needs From (ml/kg)	30  Estimated Fluid Needs To (ml/kg)	35  Estimated Fluid Needs Calculated From (ml/kg)	2175  Estimated Fluid Needs Calculated To (ml/kg)	2537    IBW for EER as %IBW>100% in ICU setting. Needs adjusted for advanced age, clinical course, wound healing, malnutrition.     Subjective:   79 year old male with Aortic valve endocarditis, pt has a past medical history significant for HTN, HLD, Type two diabetes, acute cholecystectomy complicated by pleural effusions requiring drainage who presented to [facility] with sob and weakness x 2 weeks. In ED, he was found to have elevated troponins, Cr 1.9 (baseline 0.75) [place] of [address] and transaminitis. He was hypoxic in ED with CT chest showing multifocal PNA and acute respiratory failure. He was placed on HFNC and transferred to ICU. Blood cultures were + for gram positive cocci. TTE showed reduce LVEF with thickening of AV leaflets concerning for vegetation. Steroids were started. He was placed on Vanc/Zosyn/Azithromycin and is now transferred to [facility] for management. Family discussion- Do NOT want to proceed with operation, pursuing medical mgmt at this point. Extubated after long discussion.    Pt seen for nutrition assessment, son at bedside. Pt cleared for pureed diet per SLP evaluation 5/14. Calorie count initiated 5/15 to assess %PO intake and ability to remove NGT/discontinue tube feeding. Tube feeds currently running over 16hrs to promote appetite/PO intake at meal times. Tolerating feeds with no report of nausea, vomiting, diarrhea, constipation, abdominal pain. Pt reports fair appetite, consumed cream of wheat, lemon ice, and coffee with milk for breakfast. Son also reports bringing pureed foods from home, aware of calorie count and will inform RN of food consumed from home. Pt and son also agreeable to oral nutrition supplements to optimize intake. Labs and medications reviewed. Electrolytes WNL, BUN 44<high>, glucose 105-223 x 24 hours. Ordered for IV abx, insulin regimen, protonix, miralax, senna. See nutrition recommendations below.     Previous Nutrition Diagnosis: Moderate protein calorie malnutrition in context of chronic illness related to decreased appetite as evidenced by meeting <75% EER >1 month, 8% weight loss x 5 months, moderate muscle and fat wasting     Active [ X ]  Resolved [   ]    Goal:  Pt to meet at least 75% of nutritional needs consistently via most appropriate route for nutrition  Reduce signs and symptoms of protein calorie malnutrition     Recommendations:  1. Recommend Consistent Carbohydrate diet with textures per team/SLP. Add Ensure Max Protein 2x/day (150 kcal, 30g protein per serving).   2. Continue cyclic feeds until pt can consistently meet 75-80% of needs via PO: Glucerna 1.2 at 105mL/hr x 16 hours. This provides 1680mL total volume, 2016kcal, 101g protein, 1352mL free water. Meeting 28kcal and 1.4g protein based on IBW 72.5kg.   >>Calorie count running 5/15-5/17, RD will assess on 5/18.   3. Monitor wt trends, GI function, skin integrity.  4. Monitor lytes, renal indices, blood glucose, LFTs.    5. Pain and bowel regimen per team.     Education: RD discussed continued use of enteral nutrition until pt can meet needs via PO. Discussed calorie count to assess intake. Pt and son verbalized understanding.     RD to remain available for additional nutrition interventions as needed.

## 2025-05-16 NOTE — PROGRESS NOTE ADULT - TIME BILLING
Bacteremia, endocarditis, diarrhea
IE, bacteremia
Enterococcal endocarditis, bacteremia
Bacteremia, IE, diarrhea
E fecalis bacteremia, IE, septic shock

## 2025-05-16 NOTE — PROGRESS NOTE ADULT - ASSESSMENT
78yo M with PMH of HTN, HLD, and DM2 p/w weakness and found to have endocarditis and PNA c/b respiratory failure and septic shock. Palliative consulted for complex medical decision making in the setting of critical illness.    ·	hemodynamically stable, tolerating current level of care  ·	recommend Meclizine 12.5mg PO q8h PRN for Nausea/Dizziness  ·	discussed the concerns of persistent bacteremia and the possibility that infection may never clear, patient/family are understanding and would like to continue with the current plan of care as long as it is offered        Family is interested in transferring patient to [place] -> spent time assisting with coordination (Accepting Physician name provided was Dr. Quach, case was coordinated with their Chief Hospitalist Dr. Qiu) 78yo M with PMH of HTN, HLD, and DM2 p/w weakness and found to have endocarditis and PNA c/b respiratory failure and septic shock. Palliative consulted for complex medical decision making in the setting of critical illness.    ·	hemodynamically stable, tolerating current level of care  ·	recommend Meclizine 12.5mg PO q8h PRN for Nausea/Dizziness  ·	discussed the concerns of persistent bacteremia and the possibility that infection may never clear, patient/family are understanding and would like to continue with the current plan of care as long as it is offered -> if patient is decompensating, then the goal would be to transition to symptom-directed care and optimize comfort/symptom management        Family is interested in transferring patient to [place] -> spent time assisting with coordination (Accepting Physician name provided was Dr. Quach, case was coordinated with their Chief Hospitalist Dr. Qiu)

## 2025-05-16 NOTE — OCCUPATIONAL THERAPY INITIAL EVALUATION ADULT - ADDITIONAL COMMENTS
pt. resides with his spouse in a private house with steps to enter and steps to bedroom. Pt. uses cane and RW, required more assistance with ADL/IADLs PTA.

## 2025-05-16 NOTE — CHART NOTE - NSCHARTNOTESELECT_GEN_ALL_CORE
Family Discussion
L Pigtail Removal
r Pigtail Removal
Nephrology
Nutrition Services
Nutrition Services

## 2025-05-16 NOTE — PROGRESS NOTE ADULT - SUBJECTIVE AND OBJECTIVE BOX
[facility] and Palliative Medicine  Praneeth Whitley, Palliative Care Attending  Contact Info: Call [phone] (HEAL Line) or message on Microsoft Teams (Praneeth Whitley)    SUBJECTIVE AND OBJECTIVE:  INTERVAL HPI/OVERNIGHT EVENTS: Interval events noted. Awake and alert. Feels well and denies pain or SOB. Endorses some dizziness with movement when he tried to work with PT. See patient's PRN use for the past 24hrs noted below. Comprehensive symptom assessment and GOC exploration as noted below. Extensive time spent discussing plan of care with patient/family. Family would be     ALLERGIES:  No Known Allergies    MEDICATIONS  (STANDING):  aspirin  chewable 81 milliGRAM(s) Oral daily  buMETAnide 1 milliGRAM(s) Oral once  buMETAnide Injectable 1 milliGRAM(s) IV Push two times a day  DAPTOmycin IVPB 750 milliGRAM(s) IV Intermittent every 24 hours  dextrose 5%. 1000 milliLiter(s) (50 mL/Hr) IV Continuous <Continuous>  dextrose 5%. 1000 milliLiter(s) (100 mL/Hr) IV Continuous <Continuous>  dextrose 50% Injectable 50 milliLiter(s) IV Push every 15 minutes  escitalopram 10 milliGRAM(s) Oral at bedtime  glucagon  Injectable 1 milliGRAM(s) IntraMuscular once  heparin   Injectable 5000 Unit(s) SubCutaneous every 8 hours  insulin glargine Injectable (LANTUS) 20 Unit(s) SubCutaneous at bedtime  insulin regular  human corrective regimen sliding scale   SubCutaneous every 6 hours  insulin regular  human recombinant 8 Unit(s) SubCutaneous every 6 hours  melatonin 5 milliGRAM(s) Oral at bedtime  pantoprazole    Tablet 40 milliGRAM(s) Oral before breakfast  polyethylene glycol 3350 17 Gram(s) Oral every 24 hours  senna 2 Tablet(s) Oral at bedtime  sodium chloride 0.9% lock flush 3 milliLiter(s) IV Push every 8 hours    MEDICATIONS  (PRN):  dextrose Oral Gel 15 Gram(s) Oral once PRN Blood Glucose LESS THAN 70 milliGRAM(s)/deciliter  meclizine 12.5 milliGRAM(s) Oral every 8 hours PRN Dizziness      Analgesic Use (Scheduled and PRNs) for past 24 hours:    escitalopram   10 milliGRAM(s) Oral (05-15-25 @ 21:28)    melatonin   5 milliGRAM(s) Oral (05-15-25 @ 21:28)      ITEMS UNCHECKED ARE NOT PRESENT  PRESENT SYMPTOMS/REVIEW OF SYSTEMS: []Unable to obtain due to poor mentation   Source if other than patient:  []Family   []Team         Vital Signs Last 24 Hrs  T(C): 37.1 (16 May 2025 14:00), Max: 37.1 (16 May 2025 14:00)  T(F): 98.8 (16 May 2025 14:00), Max: 98.8 (16 May 2025 14:00)  HR: 101 (16 May 2025 13:22) (93 - 107)  BP: 121/58 (16 May 2025 12:00) (111/54 - 124/60)  BP(mean): 84 (16 May 2025 12:00) (75 - 86)  RR: 19 (16 May 2025 12:00) (17 - 20)  SpO2: 94% (16 May 2025 13:22) (91% - 97%)    Parameters below as of 16 May 2025 13:22  Patient On (Oxygen Delivery Method): nasal cannula w/ humidification        LABS: Personally reviewed and interpreted                        10.5   1.24  )-----------( 115      ( 16 May 2025 06:57 )             33.4   05-16    145  |  102  |  44[H]  ----------------------------<  195[H]  3.7   |  28  |  1.30    Ca    9.1      16 May 2025 06:57  Phos  3.5     05-16  Mg     1.9     05-16    TPro  5.7[L]  /  Alb  2.5[L]  /  TBili  0.3  /  DBili  x   /  AST  57[H]  /  ALT  66[H]  /  AlkPhos  86  05-16      RADIOLOGY & ADDITIONAL STUDIES: Personally reviewed and interpreted  None new    DISCUSSION OF CASE: Family - to provide updates and emotional support; Primary Team/RN - to discuss plan of care [facility] and Palliative Medicine  Praneeth Whitley, Palliative Care Attending  Contact Info: Call [phone] (HEAL Line) or message on [employer] Teams (Praneeth Whitley)    SUBJECTIVE AND OBJECTIVE:  INTERVAL HPI/OVERNIGHT EVENTS: Interval events noted. Awake and alert. Feels well and denies pain or SOB. Endorses some dizziness with movement when he tried to work with PT. See patient's PRN use for the past 24hrs noted below. Comprehensive symptom assessment and GOC exploration as noted below. Extensive time spent discussing plan of care with patient/family. Family would be     ALLERGIES:  No Known Allergies    MEDICATIONS  (STANDING):  aspirin  chewable 81 milliGRAM(s) Oral daily  buMETAnide 1 milliGRAM(s) Oral once  buMETAnide Injectable 1 milliGRAM(s) IV Push two times a day  DAPTOmycin IVPB 750 milliGRAM(s) IV Intermittent every 24 hours  dextrose 5%. 1000 milliLiter(s) (50 mL/Hr) IV Continuous <Continuous>  dextrose 5%. 1000 milliLiter(s) (100 mL/Hr) IV Continuous <Continuous>  dextrose 50% Injectable 50 milliLiter(s) IV Push every 15 minutes  escitalopram 10 milliGRAM(s) Oral at bedtime  glucagon  Injectable 1 milliGRAM(s) IntraMuscular once  heparin   Injectable 5000 Unit(s) SubCutaneous every 8 hours  insulin glargine Injectable (LANTUS) 20 Unit(s) SubCutaneous at bedtime  insulin regular  human corrective regimen sliding scale   SubCutaneous every 6 hours  insulin regular  human recombinant 8 Unit(s) SubCutaneous every 6 hours  melatonin 5 milliGRAM(s) Oral at bedtime  pantoprazole    Tablet 40 milliGRAM(s) Oral before breakfast  polyethylene glycol 3350 17 Gram(s) Oral every 24 hours  senna 2 Tablet(s) Oral at bedtime  sodium chloride 0.9% lock flush 3 milliLiter(s) IV Push every 8 hours    MEDICATIONS  (PRN):  dextrose Oral Gel 15 Gram(s) Oral once PRN Blood Glucose LESS THAN 70 milliGRAM(s)/deciliter  meclizine 12.5 milliGRAM(s) Oral every 8 hours PRN Dizziness      Analgesic Use (Scheduled and PRNs) for past 24 hours:    escitalopram   10 milliGRAM(s) Oral (05-15-25 @ 21:28)    melatonin   5 milliGRAM(s) Oral (05-15-25 @ 21:28)      ITEMS UNCHECKED ARE NOT PRESENT  PRESENT SYMPTOMS/REVIEW OF SYSTEMS: []Unable to obtain due to poor mentation   Source if other than patient:  []Family   []Team     Pain: [] yes [x] no  QOL impact -   Location -                    Aggravating Factors -  Quality -  Radiation -  Timing -  Severity (0-10 scale) -   Minimal Acceptable Level (0-10 scale) -    Other Symptoms: See ESAS Section Below  [x]All other review of systems negative     GENERAL:  [x] NAD [x]Alert []Lethargic  []Cachexia  []Unarousable  [x]Verbal  []Non-Verbal  BEHAVIORAL:   []Anxiety  []Delirium []Agitation [x]Cooperative [x]Oriented x3  HEENT:  [x]Normal  [x] Moist Mucous Membranes []Dry mouth   []ET Tube/Trach  []Oral lesions  PULMONARY:   []Clear []Tachypnea  []Audible excessive secretions  [x]Normal Work of Breathing []Labored Breathing  []Rhonchi []Crackles []Wheezing  CARDIOVASCULAR:    [x]Regular Rate []Regular Rhythm []Irregular []Tachy  []Jorge L  GASTROINTESTINAL:  [x]Soft  []Distended   []+BS  [x]Non tender []Tender  []PEG [x]OGT/ NGT  Last BM:  GENITOURINARY:  [x]Normal [] Incontinent   []Oliguria/Anuria   []Campuzano  MUSCULOSKELETAL:   [x]Normal Extremities  [x]Weakness  []Bed/Wheelchair bound []Edema  NEUROLOGIC:   [x]No focal deficits  []Cognitive impairment  []Dysphagia []Dysarthria []Paresis []Encephalopathic  SKIN:   [x]Normal   []Pressure ulcer(s)  []Rash    Vital Signs Last 24 Hrs  T(C): 37.1 (16 May 2025 14:00), Max: 37.1 (16 May 2025 14:00)  T(F): 98.8 (16 May 2025 14:00), Max: 98.8 (16 May 2025 14:00)  HR: 101 (16 May 2025 13:22) (93 - 107)  BP: 121/58 (16 May 2025 12:00) (111/54 - 124/60)  BP(mean): 84 (16 May 2025 12:00) (75 - 86)  RR: 19 (16 May 2025 12:00) (17 - 20)  SpO2: 94% (16 May 2025 13:22) (91% - 97%)    Parameters below as of 16 May 2025 13:22  Patient On (Oxygen Delivery Method): nasal cannula w/ humidification    LABS: Personally reviewed and interpreted                   10.5   1.24  )-----------( 115      ( 16 May 2025 06:57 )             33.4   05-16    145  |  102  |  44[H]  ----------------------------<  195[H]  3.7   |  28  |  1.30    Ca    9.1      16 May 2025 06:57  Phos  3.5     05-16  Mg     1.9     05-16  TPro  5.7[L]  /  Alb  2.5[L]  /  TBili  0.3  /  DBili  x   /  AST  57[H]  /  ALT  66[H]  /  AlkPhos  86  05-16    RADIOLOGY & ADDITIONAL STUDIES: Personally reviewed and interpreted  None new    DISCUSSION OF CASE: Son, Wife - to provide updates and emotional support; Primary Team/RN - to discuss plan of care

## 2025-05-16 NOTE — DISCHARGE NOTE PROVIDER - HOSPITAL COURSE
79 year old male with past medical history of HTN, HLD, T2DM, acute cholecystectomy c/b pleural effusions requiring drainage who presented to [facility] with sob and weakness x 2 weeks. In ED, he was found to have elevated troponins, Cr 1.9 (baseline 0.75) [place] [address] and transaminitis He was hypoxic in ED with CT chest showing multifocal PNA and acute respiratory failure. He was placed on HFNC and transferred to ICU. Blood cultures were + for gram positive cocci. TTE showed reduce LVEF with thickening of AV leaflets concerning for vegetation. Steroids were started.  He was placed on Vancomycin/ Zosyn/ Azithromycin and transferred to [facility] for management.     He arrived on high dose pressors. Emergently intubated and lined-up. Dobutamine added for septic cardiomyopathy. [place] showed low CI. Renal and ID were consulted. Pigtails placed bilaterally with 1L per side. Diuresed. Antibiotics were changed to daptomycin.     5/13: SHELYL reported echodensities on the Aortic Valve suggestive of a vegetations. Left ventricular systolic function is mildly decreased. No evidence of left atrial or left atrial appendage thrombus. No evidence of aortic root abscess of vegetation on other valves. Severe aortic regurgitation. Mild to moderate mitral regurgitation.       Family discussion was had with multiple disciplinary team, including surgery and palliative care. Family does NOT want to proceed with operation secondary to high risk for morbidity and mortality. They agree with pursuing medical management at this point. Patient was extubated after long discussion. Patient was delined and Dobutamine turned off. Patient was transferred to ___ on ___.    Mr. Martinez is a 79 year old male with past medical history of HTN, HLD, T2DM, acute cholecystectomy c/b pleural effusions requiring drainage who presented to [facility] with SOB and weakness x 2 weeks. In ED, he was found to have elevated troponins, Cr 1.9 (baseline 0.75) [place] [address] and transaminitis. He was hypoxic in ED with CT chest showing multifocal PNA and acute respiratory failure. He was placed on HFNC and transferred to ICU. He was found to have enterococcus faecalis bacteremia and TTE showed aortic valve vegetation. He was started on IV antibiotics and transferred to [facility] on 5/9/24 for possible surgical interveion. Blood cultures were positive for Enterococcus Faecalis TTE showed reduce LVEF with thickening of AV leaflets concerning for vegetation. He was placed on Vancomycin/ Zosyn/ Azithromycin and transferred to [facility] for management.     He arrived on high dose pressors and emergently intubated. Dobutamine added for septic cardiomyopathy. [place] showed low CI. Renal and ID were consulted. Pigtails placed bilaterally and drained with 1L serous fluid per side. ID was consulted. He had leukopenia    5/13: SHELLY reported echodensities on the Aortic Valve suggestive of a vegetations. Left ventricular systolic function is mildly decreased. No evidence of left atrial or left atrial appendage thrombus. No evidence of aortic root abscess of vegetation on other valves. Severe aortic regurgitation. Mild to moderate mitral regurgitation.       Family discussion was had with multiple disciplinary team, including surgery and palliative care. Family does NOT want to proceed with operation secondary to high risk for morbidity and mortality. They agree with pursuing medical management at this point. Patient was extubated after long discussion. Patient was delined and Dobutamine turned off. Patient was transferred to ___ on ___.    Mr. Martinez is a 79 year old male with past medical history of HTN, HLD, T2DM, acute cholecystectomy c/b pleural effusions requiring drainage who presented to [facility] with SOB and weakness x 2 weeks. In ED, he was found to have elevated troponins, Cr 1.9 (baseline 0.75) [place] [address] and transaminitis. He was hypoxic in ED with CT chest showing multifocal PNA and acute respiratory failure. He was placed on HFNC and transferred to ICU. He was found to have enterococcus faecalis bacteremia and TTE showed aortic valve vegetation. He was started on IV antibiotics and transferred to [facility] on 5/9/24 for possible surgical interveion. Blood cultures were positive for Enterococcus Faecalis TTE showed reduce LVEF with thickening of AV leaflets concerning for vegetation. He was placed on Vancomycin/ Zosyn/ Azithromycin and transferred to [facility] for management.     He arrived on high dose pressors and emergently intubated. Dobutamine added for septic cardiomyopathy. [place] showed low CI. Renal and ID were consulted. Pigtails placed bilaterally and drained with 1L serous fluid per side. ID was consulted. He had leukopenia and antibiotics were changed to Daptomycin.    5/13: SHELLY reported echodensities on the Aortic Valve suggestive of a vegetations. Left ventricular systolic function is mildly decreased. No evidence of left atrial or left atrial appendage thrombus. No evidence of aortic root abscess of vegetation on other valves. Severe aortic regurgitation. Mild to moderate mitral regurgitation.       Family discussion was had with multiple disciplinary team, including cardiac surgery and palliative care. Surgical risk was quoted at 30-50% mortality and high risk of need of dialysis, tracheostomy, and prolonged recovery. Family and patient elected to not proceed with operation due to high risk for morbidity and mortality. They agree with pursuing medical management at this point. Patient was extubated after long discussion. Patient was delined and Dobutamine turned off. Patient agreed to pursue with medical management with IV antibiotics. Patient was transferred to ___ on ___.    Mr. Martinez is a 79 year old male with past medical history of HTN, HLD, T2DM, acute cholecystectomy c/b pleural effusions requiring drainage who presented to [facility] with SOB and weakness x 2 weeks. In ED, he was found to have elevated troponins, Cr 1.9 (baseline 0.75) [place] [address] and transaminitis. He was hypoxic in ED with CT chest showing multifocal PNA and acute respiratory failure. He was placed on HFNC and transferred to ICU. He was found to have enterococcus faecalis bacteremia and TTE showed aortic valve vegetation. He was started on IV antibiotics and transferred to [facility] on 5/9/24 for possible surgical interveion. Blood cultures were positive for Enterococcus Faecalis TTE showed reduce LVEF with thickening of AV leaflets concerning for vegetation. He was placed on Vancomycin/ Zosyn/ Azithromycin and transferred to [facility] for management.     He arrived on high dose pressors and emergently intubated. Dobutamine added for septic cardiomyopathy. [place] showed low CI. Renal and ID were consulted. Pigtails placed bilaterally and drained with 1L serous fluid per side. ID was consulted. He had leukopenia and antibiotics were changed to Daptomycin.    5/13: SHELLY reported echodensities on the Aortic Valve suggestive of a vegetations. Left ventricular systolic function is mildly decreased. No evidence of left atrial or left atrial appendage thrombus. No evidence of aortic root abscess of vegetation on other valves. Severe aortic regurgitation. Mild to moderate mitral regurgitation.       Family discussion was had with multiple disciplinary team, including cardiac surgery and palliative care. Surgical risk was quoted at 30-50% mortality and high risk of need of dialysis, tracheostomy, and prolonged recovery. Family and patient elected to not proceed with operation due to high risk for morbidity and mortality. They agree with pursuing medical management at this point. Patient was extubated after long discussion. Patient was delined and Dobutamine turned off. Patient agreed to pursue with medical management with IV antibiotics. Patient was transferred to [facility] on 5/16/25.    Appearance: laying in bed, comfortable on NRB and NGT in nose.  Neurologic: AAOx3, no AMS or focal deficits.  Responds appropriately to verbal and physical stimuli; exhibits purposeful movement in all extremities. Answers questions appropriately.  Cardiovascular: RRR  Respiratory: No acute respiratory distress. Bilateral crackles and breath sounds diminished at bases.  Gastrointestinal:  Soft, non-tender, non-distended, + BS.	  Extremities: warm, well perfused. No LE edema.    Vital Signs Last 24 Hrs  T(C): 36.9 (16 May 2025 16:50), Max: 37.1 (16 May 2025 14:00)  T(F): 98.5 (16 May 2025 16:50), Max: 98.8 (16 May 2025 14:00)  HR: 101 (16 May 2025 13:22) (93 - 107)  BP: 121/58 (16 May 2025 12:00) (111/54 - 124/60)  BP(mean): 84 (16 May 2025 12:00) (75 - 86)  RR: 19 (16 May 2025 12:00) (17 - 20)  SpO2: 94% (16 May 2025 13:22) (91% - 97%) on NRB      Blood cultures drawn on 5/16/25 pending.  Last blood Cultures resulted:  Blood (05.13.25 @ 08:58)   Gram Stain:   Growth in anaerobic bottle: Gram Positive Cocci in Pairs and Chains  Specimen Source: Blood Blood-Peripheral  Culture Results:   Growth in anaerobic bottle: Enterococcus faecalis

## 2025-05-16 NOTE — OCCUPATIONAL THERAPY INITIAL EVALUATION ADULT - DIAGNOSIS, OT EVAL
Pt. admitted to [facility] for infection control and septic shock. Upon assessment, pt. demo generalized deconditioning and impaired balance/postural control impacting engagement in ADLs and functional mob/transfers.

## 2025-05-16 NOTE — OCCUPATIONAL THERAPY INITIAL EVALUATION ADULT - GENERAL OBSERVATIONS, REHAB EVAL
OT IE complete. LUCY Stephenson clearing pt. for session. Pt. received semi-supine in bed, +HFNC 40/40, +NNGT (disconnected by RN), +heplock, +cabrera, +b/l SCDs, +tele in NAD, agreeable to session with encouragement.

## 2025-05-16 NOTE — PROGRESS NOTE ADULT - PROBLEM SELECTOR PLAN 4
Patient is DNR/DNI, MOLST in chart  -family is acting collectively to assist with decision making  -see GOC note: if patient is decompensating, then will discuss a transition to symptom-directed/comfort care and hospice

## 2025-05-16 NOTE — DISCHARGE NOTE PROVIDER - DETAILS OF MALNUTRITION DIAGNOSIS/DIAGNOSES
This patient has been assessed with a concern for Malnutrition and was treated during this hospitalization for the following Nutrition diagnosis/diagnoses:     -  05/14/2025: Moderate protein-calorie malnutrition

## 2025-05-16 NOTE — PROGRESS NOTE ADULT - PROBLEM SELECTOR PLAN 1
PPSV = 40%, requires assistance for most ADLs  -PT Eval recommending Acute Rehab  -c/w supportive care, OOB, encourage movement

## 2025-05-17 LAB
-  AMPICILLIN: SIGNIFICANT CHANGE UP
-  GENTAMICIN SYNERGY: SIGNIFICANT CHANGE UP
-  STREPTOMYCIN SYNERGY: SIGNIFICANT CHANGE UP
-  VANCOMYCIN: SIGNIFICANT CHANGE UP
CULTURE RESULTS: ABNORMAL
CULTURE RESULTS: ABNORMAL
CULTURE RESULTS: SIGNIFICANT CHANGE UP
METHOD TYPE: SIGNIFICANT CHANGE UP
ORGANISM # SPEC MICROSCOPIC CNT: ABNORMAL
ORGANISM # SPEC MICROSCOPIC CNT: SIGNIFICANT CHANGE UP
SPECIMEN SOURCE: SIGNIFICANT CHANGE UP

## 2025-05-21 LAB
CULTURE RESULTS: SIGNIFICANT CHANGE UP
SPECIMEN SOURCE: SIGNIFICANT CHANGE UP